# Patient Record
Sex: FEMALE | Race: WHITE | URBAN - METROPOLITAN AREA
[De-identification: names, ages, dates, MRNs, and addresses within clinical notes are randomized per-mention and may not be internally consistent; named-entity substitution may affect disease eponyms.]

---

## 2023-09-21 ENCOUNTER — HOSPITAL ENCOUNTER (INPATIENT)
Age: 68
LOS: 5 days | Discharge: HOME | DRG: 055 | End: 2023-09-26
Attending: INTERNAL MEDICINE | Admitting: INTERNAL MEDICINE
Payer: COMMERCIAL

## 2023-09-21 PROBLEM — G40.409 GENERALIZED TONIC-CLONIC SEIZURE (HCC): Status: ACTIVE | Noted: 2023-09-21

## 2023-09-21 PROBLEM — R56.9 SEIZURE (HCC): Status: ACTIVE | Noted: 2023-09-21

## 2023-09-21 PROBLEM — R93.0 ABNORMAL CT OF THE HEAD: Status: ACTIVE | Noted: 2023-09-21

## 2023-09-21 LAB
ALBUMIN SERPL-MCNC: 3.9 G/DL (ref 3.4–5)
ALBUMIN/GLOB SERPL: 1.8 {RATIO} (ref 1.1–2.2)
ALP SERPL-CCNC: 46 U/L (ref 40–129)
ALT SERPL-CCNC: 17 U/L (ref 10–40)
ANION GAP SERPL CALCULATED.3IONS-SCNC: 11 MMOL/L (ref 3–16)
ANION GAP SERPL CALCULATED.3IONS-SCNC: 14 MMOL/L (ref 3–16)
AST SERPL-CCNC: 26 U/L (ref 15–37)
BASOPHILS # BLD: 0 K/UL (ref 0–0.2)
BASOPHILS NFR BLD: 0.3 %
BILIRUB SERPL-MCNC: 0.7 MG/DL (ref 0–1)
BUN SERPL-MCNC: 15 MG/DL (ref 7–20)
BUN SERPL-MCNC: 17 MG/DL (ref 7–20)
CALCIUM SERPL-MCNC: 7.9 MG/DL (ref 8.3–10.6)
CALCIUM SERPL-MCNC: 8.4 MG/DL (ref 8.3–10.6)
CHLORIDE SERPL-SCNC: 98 MMOL/L (ref 99–110)
CHLORIDE SERPL-SCNC: 99 MMOL/L (ref 99–110)
CK SERPL-CCNC: 445 U/L (ref 26–192)
CO2 SERPL-SCNC: 22 MMOL/L (ref 21–32)
CO2 SERPL-SCNC: 23 MMOL/L (ref 21–32)
CREAT SERPL-MCNC: 0.7 MG/DL (ref 0.6–1.2)
CREAT SERPL-MCNC: 0.8 MG/DL (ref 0.6–1.2)
DEPRECATED RDW RBC AUTO: 13.7 % (ref 12.4–15.4)
EOSINOPHIL # BLD: 0 K/UL (ref 0–0.6)
EOSINOPHIL NFR BLD: 0 %
GFR SERPLBLD CREATININE-BSD FMLA CKD-EPI: >60 ML/MIN/{1.73_M2}
GFR SERPLBLD CREATININE-BSD FMLA CKD-EPI: >60 ML/MIN/{1.73_M2}
GLUCOSE SERPL-MCNC: 111 MG/DL (ref 70–99)
GLUCOSE SERPL-MCNC: 139 MG/DL (ref 70–99)
HCT VFR BLD AUTO: 32.4 % (ref 36–48)
HGB BLD-MCNC: 11.2 G/DL (ref 12–16)
LACTATE BLDV-SCNC: 1.4 MMOL/L (ref 0.4–2)
LYMPHOCYTES # BLD: 1.3 K/UL (ref 1–5.1)
LYMPHOCYTES NFR BLD: 8.7 %
MAGNESIUM SERPL-MCNC: 1.6 MG/DL (ref 1.8–2.4)
MCH RBC QN AUTO: 30.6 PG (ref 26–34)
MCHC RBC AUTO-ENTMCNC: 34.5 G/DL (ref 31–36)
MCV RBC AUTO: 88.5 FL (ref 80–100)
MONOCYTES # BLD: 1.6 K/UL (ref 0–1.3)
MONOCYTES NFR BLD: 10.8 %
NEUTROPHILS # BLD: 11.6 K/UL (ref 1.7–7.7)
NEUTROPHILS NFR BLD: 80.2 %
PLATELET # BLD AUTO: 235 K/UL (ref 135–450)
PMV BLD AUTO: 7.4 FL (ref 5–10.5)
POTASSIUM SERPL-SCNC: 2.8 MMOL/L (ref 3.5–5.1)
POTASSIUM SERPL-SCNC: 3.2 MMOL/L (ref 3.5–5.1)
PROT SERPL-MCNC: 6.1 G/DL (ref 6.4–8.2)
RBC # BLD AUTO: 3.66 M/UL (ref 4–5.2)
SODIUM SERPL-SCNC: 132 MMOL/L (ref 136–145)
SODIUM SERPL-SCNC: 135 MMOL/L (ref 136–145)
WBC # BLD AUTO: 14.4 K/UL (ref 4–11)

## 2023-09-21 PROCEDURE — 2580000003 HC RX 258: Performed by: INTERNAL MEDICINE

## 2023-09-21 PROCEDURE — 83605 ASSAY OF LACTIC ACID: CPT

## 2023-09-21 PROCEDURE — 36415 COLL VENOUS BLD VENIPUNCTURE: CPT

## 2023-09-21 PROCEDURE — 6360000002 HC RX W HCPCS: Performed by: INTERNAL MEDICINE

## 2023-09-21 PROCEDURE — 80053 COMPREHEN METABOLIC PANEL: CPT

## 2023-09-21 PROCEDURE — APPNB60 APP NON BILLABLE TIME 46-60 MINS: Performed by: NURSE PRACTITIONER

## 2023-09-21 PROCEDURE — 82550 ASSAY OF CK (CPK): CPT

## 2023-09-21 PROCEDURE — 2060000000 HC ICU INTERMEDIATE R&B

## 2023-09-21 PROCEDURE — 99223 1ST HOSP IP/OBS HIGH 75: CPT | Performed by: PSYCHIATRY & NEUROLOGY

## 2023-09-21 PROCEDURE — 83735 ASSAY OF MAGNESIUM: CPT

## 2023-09-21 PROCEDURE — 1200000000 HC SEMI PRIVATE

## 2023-09-21 PROCEDURE — 85025 COMPLETE CBC W/AUTO DIFF WBC: CPT

## 2023-09-21 PROCEDURE — 99285 EMERGENCY DEPT VISIT HI MDM: CPT

## 2023-09-21 RX ORDER — LEVETIRACETAM 500 MG/5ML
500 INJECTION, SOLUTION, CONCENTRATE INTRAVENOUS EVERY 12 HOURS
Status: DISCONTINUED | OUTPATIENT
Start: 2023-09-21 | End: 2023-09-22

## 2023-09-21 RX ORDER — METOPROLOL SUCCINATE 50 MG/1
50 TABLET, EXTENDED RELEASE ORAL NIGHTLY
Status: DISCONTINUED | OUTPATIENT
Start: 2023-09-21 | End: 2023-09-26 | Stop reason: HOSPADM

## 2023-09-21 RX ORDER — POLYETHYLENE GLYCOL 3350 17 G/17G
17 POWDER, FOR SOLUTION ORAL DAILY PRN
Status: DISCONTINUED | OUTPATIENT
Start: 2023-09-21 | End: 2023-09-26 | Stop reason: HOSPADM

## 2023-09-21 RX ORDER — ONDANSETRON 2 MG/ML
4 INJECTION INTRAMUSCULAR; INTRAVENOUS EVERY 6 HOURS PRN
Status: DISCONTINUED | OUTPATIENT
Start: 2023-09-21 | End: 2023-09-26 | Stop reason: HOSPADM

## 2023-09-21 RX ORDER — SODIUM CHLORIDE, SODIUM LACTATE, POTASSIUM CHLORIDE, CALCIUM CHLORIDE 600; 310; 30; 20 MG/100ML; MG/100ML; MG/100ML; MG/100ML
INJECTION, SOLUTION INTRAVENOUS CONTINUOUS
Status: DISCONTINUED | OUTPATIENT
Start: 2023-09-21 | End: 2023-09-26 | Stop reason: HOSPADM

## 2023-09-21 RX ORDER — POTASSIUM CHLORIDE 20 MEQ/1
40 TABLET, EXTENDED RELEASE ORAL PRN
Status: DISCONTINUED | OUTPATIENT
Start: 2023-09-21 | End: 2023-09-26 | Stop reason: HOSPADM

## 2023-09-21 RX ORDER — LORAZEPAM 2 MG/ML
4 INJECTION INTRAMUSCULAR EVERY 5 MIN PRN
Status: DISCONTINUED | OUTPATIENT
Start: 2023-09-21 | End: 2023-09-26 | Stop reason: HOSPADM

## 2023-09-21 RX ORDER — SODIUM CHLORIDE 0.9 % (FLUSH) 0.9 %
5-40 SYRINGE (ML) INJECTION PRN
Status: DISCONTINUED | OUTPATIENT
Start: 2023-09-21 | End: 2023-09-26 | Stop reason: HOSPADM

## 2023-09-21 RX ORDER — POTASSIUM CHLORIDE 7.45 MG/ML
10 INJECTION INTRAVENOUS PRN
Status: DISCONTINUED | OUTPATIENT
Start: 2023-09-21 | End: 2023-09-26 | Stop reason: HOSPADM

## 2023-09-21 RX ORDER — SODIUM CHLORIDE 0.9 % (FLUSH) 0.9 %
5-40 SYRINGE (ML) INJECTION EVERY 12 HOURS SCHEDULED
Status: DISCONTINUED | OUTPATIENT
Start: 2023-09-21 | End: 2023-09-26 | Stop reason: HOSPADM

## 2023-09-21 RX ORDER — NEBIVOLOL 5 MG/1
5 TABLET ORAL NIGHTLY
Status: DISCONTINUED | OUTPATIENT
Start: 2023-09-21 | End: 2023-09-21

## 2023-09-21 RX ORDER — MAGNESIUM HYDROXIDE/ALUMINUM HYDROXICE/SIMETHICONE 120; 1200; 1200 MG/30ML; MG/30ML; MG/30ML
30 SUSPENSION ORAL EVERY 6 HOURS PRN
Status: DISCONTINUED | OUTPATIENT
Start: 2023-09-21 | End: 2023-09-26 | Stop reason: HOSPADM

## 2023-09-21 RX ORDER — ENOXAPARIN SODIUM 100 MG/ML
40 INJECTION SUBCUTANEOUS DAILY
Status: DISPENSED | OUTPATIENT
Start: 2023-09-21 | End: 2023-09-24

## 2023-09-21 RX ORDER — ACETAMINOPHEN 650 MG/1
650 SUPPOSITORY RECTAL EVERY 6 HOURS PRN
Status: DISCONTINUED | OUTPATIENT
Start: 2023-09-21 | End: 2023-09-26 | Stop reason: HOSPADM

## 2023-09-21 RX ORDER — MAGNESIUM SULFATE IN WATER 40 MG/ML
2000 INJECTION, SOLUTION INTRAVENOUS PRN
Status: DISCONTINUED | OUTPATIENT
Start: 2023-09-21 | End: 2023-09-26 | Stop reason: HOSPADM

## 2023-09-21 RX ORDER — ONDANSETRON 4 MG/1
4 TABLET, ORALLY DISINTEGRATING ORAL EVERY 8 HOURS PRN
Status: DISCONTINUED | OUTPATIENT
Start: 2023-09-21 | End: 2023-09-26 | Stop reason: HOSPADM

## 2023-09-21 RX ORDER — HYDROCHLOROTHIAZIDE 25 MG/1
25 TABLET ORAL NIGHTLY
COMMUNITY
Start: 2023-06-29

## 2023-09-21 RX ORDER — ACETAMINOPHEN 325 MG/1
650 TABLET ORAL EVERY 6 HOURS PRN
Status: DISCONTINUED | OUTPATIENT
Start: 2023-09-21 | End: 2023-09-26 | Stop reason: HOSPADM

## 2023-09-21 RX ORDER — MAGNESIUM SULFATE IN WATER 40 MG/ML
2000 INJECTION, SOLUTION INTRAVENOUS ONCE
Status: DISCONTINUED | OUTPATIENT
Start: 2023-09-21 | End: 2023-09-26 | Stop reason: HOSPADM

## 2023-09-21 RX ORDER — SODIUM CHLORIDE 9 MG/ML
INJECTION, SOLUTION INTRAVENOUS PRN
Status: DISCONTINUED | OUTPATIENT
Start: 2023-09-21 | End: 2023-09-26 | Stop reason: HOSPADM

## 2023-09-21 RX ADMIN — LEVETIRACETAM 500 MG: 500 INJECTION INTRAVENOUS at 17:34

## 2023-09-21 RX ADMIN — POTASSIUM CHLORIDE 10 MEQ: 7.46 INJECTION, SOLUTION INTRAVENOUS at 12:15

## 2023-09-21 RX ADMIN — POTASSIUM CHLORIDE 10 MEQ: 7.46 INJECTION, SOLUTION INTRAVENOUS at 10:36

## 2023-09-21 RX ADMIN — POTASSIUM CHLORIDE 10 MEQ: 7.46 INJECTION, SOLUTION INTRAVENOUS at 08:59

## 2023-09-21 RX ADMIN — MAGNESIUM SULFATE HEPTAHYDRATE 2000 MG: 40 INJECTION, SOLUTION INTRAVENOUS at 07:56

## 2023-09-21 RX ADMIN — POTASSIUM CHLORIDE 10 MEQ: 7.46 INJECTION, SOLUTION INTRAVENOUS at 13:56

## 2023-09-21 RX ADMIN — POTASSIUM CHLORIDE 10 MEQ: 7.46 INJECTION, SOLUTION INTRAVENOUS at 15:11

## 2023-09-21 RX ADMIN — SODIUM CHLORIDE, POTASSIUM CHLORIDE, SODIUM LACTATE AND CALCIUM CHLORIDE: 600; 310; 30; 20 INJECTION, SOLUTION INTRAVENOUS at 05:25

## 2023-09-21 RX ADMIN — LEVETIRACETAM 500 MG: 500 INJECTION INTRAVENOUS at 05:28

## 2023-09-21 RX ADMIN — SODIUM CHLORIDE, PRESERVATIVE FREE 10 ML: 5 INJECTION INTRAVENOUS at 21:33

## 2023-09-21 RX ADMIN — POTASSIUM CHLORIDE 10 MEQ: 7.46 INJECTION, SOLUTION INTRAVENOUS at 07:07

## 2023-09-21 NOTE — H&P
seizures. According to her family, she was having nausea and vomiting with a dull headache yesterday morning. She was partially somnolent all yesterday and in the evening she became confused. She could not remember recent events. She was taken to ER for evaluation. Stat blood sugar was normal.  She had 2 episodes of seizure in the ER. The first episode was while she was sitting in a wheelchair-tonic-clonic seizure activity that lasted for 20 seconds. She had another episode after returning back from CT scan during which she bit her tongue. This episode lasted for 30 seconds. She received Ativan, vancomycin and Zosyn. WBC 18,000, potassium 3.0, normal LFT, normal UA and D-dimer. No history of seizure disorder. LP was done and patient transferred here for further management. Review of Systems:        Pertinent positives and negatives discussed in HPI     Objective:   No intake or output data in the 24 hours ending 09/21/23 1146   Vitals:   Vitals:    09/21/23 0730 09/21/23 0759 09/21/23 0900 09/21/23 1000   BP: (!) 147/97  133/80 (!) 141/76   Pulse: 73  81 87   Resp: 19 29 24   Temp:       TempSrc:       SpO2: 98%  97% 95%   Weight:  135 lb 14.4 oz (61.6 kg)     Height:  5' (1.524 m)         Medications Prior to Admission     Prior to Admission medications    Medication Sig Start Date End Date Taking? Authorizing Provider   Nebivolol HCl (BYSTOLIC PO) Take 5 mg by mouth nightly   Yes Historical Provider, MD   hydroCHLOROthiazide (HYDRODIURIL) 25 MG tablet Take 1 tablet by mouth nightly 6/29/23   Historical Provider, MD       Physical Exam:    Physical Exam     General: No apparent respiratory distress. Eyes: EOMI  ENT: neck supple  Cardiovascular: Regular rate. Respiratory: Clear to auscultation  Gastrointestinal: Soft, non tender  Genitourinary: no suprapubic tenderness  Musculoskeletal: No edema  Skin: warm, dry  Neuro: Alert and awake.   Lethargic but no gross focal neurological

## 2023-09-21 NOTE — ED NOTES
[place], [place]  09/21/23 1007
MRI reported they most likely will not be able to take pt for imaging until tonight. Provider notified. Pt and pt's family notified.       [place], [address] [place] [address]  09/21/23 0453
Patient potassium was 2.8/ [facility] about critical value. Protocol order started.        Jade Granados RN  09/21/23 8989
Potassium replacement complete per provider order. 6 doses of potassium chloride 10mEq/100mL IVPB have been administered (see MAR).       [place], [place]  09/21/23 9794
Pt observed resting in bed with eyes closed. Family at bedside. Pt denies pain, appears to be lethargic and drifts off between assessment questions. Per night shift RN's report, pt has been lethargic since arrival to ED as she received medication en route. Seizure pads in place on side rails of stretcher. Suction and oxygen equipment ready at bedside. Pt remains on bedside cardiac monitor.       Su vanegas RN  09/21/23 [address] [place] [address] JERO Cohen  09/21/23 7187
Right Hand (Active)     PO Status: Nothing by Mouth  Pertinent or High Risk Medications/Drips: yes   If Yes, please provide details: potassium   Pending Blood Product Administration: no       You may also review the ED PT Care Timeline found under the Summary Nursing Index tab. Recommendation    Pending orders   Plan for Discharge (if known): Additional Comments: pt alert and oriented. Pt able to answer questions but has delayed responses at times. MRI screening is completed.    If any further questions, please call Sending RN at [phone]    Electronically signed by: Electronically signed by Luz Ritter RN on 9/21/2023 at 2:52 PM      Susanne Huang  09/21/23 5109

## 2023-09-21 NOTE — ED TRIAGE NOTES
Patient arrived to the ER via ambulance from [facility]. Patient eye's are close but open to voice. Vital are stable and on the monitor. Patient family is in the patient room.

## 2023-09-21 NOTE — PLAN OF CARE
Problem: Discharge Planning  Goal: Discharge to home or other facility with appropriate resources  Outcome: Progressing   Pt involved in discharge planning. Barriers to discharge discussed with patient. Discharge learning needs identified. Discuss with patient any additional needed resources and transportation plans. Case management following plan of care. Problem: Safety - Adult  Goal: Free from fall injury  Outcome: Progressing   All fall precautions in place. Bed locked and in lowest position with alarm on. Overbed table and personal belonings within reach. Call light within reach and patient instructed to use call light for assistance. Non-skid socks on.

## 2023-09-22 ENCOUNTER — APPOINTMENT (OUTPATIENT)
Dept: MRI IMAGING | Age: 68
DRG: 055 | End: 2023-09-22
Attending: INTERNAL MEDICINE
Payer: COMMERCIAL

## 2023-09-22 ENCOUNTER — APPOINTMENT (OUTPATIENT)
Dept: GENERAL RADIOLOGY | Age: 68
DRG: 055 | End: 2023-09-22
Attending: INTERNAL MEDICINE
Payer: COMMERCIAL

## 2023-09-22 LAB
ANION GAP SERPL CALCULATED.3IONS-SCNC: 14 MMOL/L (ref 3–16)
BUN SERPL-MCNC: 13 MG/DL (ref 7–20)
CALCIUM SERPL-MCNC: 8.4 MG/DL (ref 8.3–10.6)
CHLORIDE SERPL-SCNC: 96 MMOL/L (ref 99–110)
CO2 SERPL-SCNC: 25 MMOL/L (ref 21–32)
CREAT SERPL-MCNC: 0.7 MG/DL (ref 0.6–1.2)
GFR SERPLBLD CREATININE-BSD FMLA CKD-EPI: >60 ML/MIN/{1.73_M2}
GLUCOSE SERPL-MCNC: 118 MG/DL (ref 70–99)
MAGNESIUM SERPL-MCNC: 1.9 MG/DL (ref 1.8–2.4)
NT-PROBNP SERPL-MCNC: 3436 PG/ML (ref 0–124)
POTASSIUM SERPL-SCNC: 3.1 MMOL/L (ref 3.5–5.1)
PROCALCITONIN SERPL IA-MCNC: 0.1 NG/ML (ref 0–0.15)
SODIUM SERPL-SCNC: 135 MMOL/L (ref 136–145)

## 2023-09-22 PROCEDURE — 2580000003 HC RX 258: Performed by: NURSE PRACTITIONER

## 2023-09-22 PROCEDURE — 6360000002 HC RX W HCPCS: Performed by: NURSE PRACTITIONER

## 2023-09-22 PROCEDURE — 70553 MRI BRAIN STEM W/O & W/DYE: CPT

## 2023-09-22 PROCEDURE — 71045 X-RAY EXAM CHEST 1 VIEW: CPT

## 2023-09-22 PROCEDURE — 97530 THERAPEUTIC ACTIVITIES: CPT

## 2023-09-22 PROCEDURE — 2060000000 HC ICU INTERMEDIATE R&B

## 2023-09-22 PROCEDURE — 94761 N-INVAS EAR/PLS OXIMETRY MLT: CPT

## 2023-09-22 PROCEDURE — 84145 PROCALCITONIN (PCT): CPT

## 2023-09-22 PROCEDURE — 6370000000 HC RX 637 (ALT 250 FOR IP): Performed by: NURSE PRACTITIONER

## 2023-09-22 PROCEDURE — 6370000000 HC RX 637 (ALT 250 FOR IP): Performed by: INTERNAL MEDICINE

## 2023-09-22 PROCEDURE — 83735 ASSAY OF MAGNESIUM: CPT

## 2023-09-22 PROCEDURE — A9576 INJ PROHANCE MULTIPACK: HCPCS | Performed by: EMERGENCY MEDICINE

## 2023-09-22 PROCEDURE — 97166 OT EVAL MOD COMPLEX 45 MIN: CPT

## 2023-09-22 PROCEDURE — 99233 SBSQ HOSP IP/OBS HIGH 50: CPT | Performed by: NURSE PRACTITIONER

## 2023-09-22 PROCEDURE — 87529 HSV DNA AMP PROBE: CPT

## 2023-09-22 PROCEDURE — 80048 BASIC METABOLIC PNL TOTAL CA: CPT

## 2023-09-22 PROCEDURE — 2580000003 HC RX 258: Performed by: INTERNAL MEDICINE

## 2023-09-22 PROCEDURE — 6360000002 HC RX W HCPCS: Performed by: INTERNAL MEDICINE

## 2023-09-22 PROCEDURE — 6360000004 HC RX CONTRAST MEDICATION: Performed by: EMERGENCY MEDICINE

## 2023-09-22 PROCEDURE — 83880 ASSAY OF NATRIURETIC PEPTIDE: CPT

## 2023-09-22 PROCEDURE — 97162 PT EVAL MOD COMPLEX 30 MIN: CPT

## 2023-09-22 PROCEDURE — 86592 SYPHILIS TEST NON-TREP QUAL: CPT

## 2023-09-22 PROCEDURE — 89050 BODY FLUID CELL COUNT: CPT

## 2023-09-22 PROCEDURE — 36415 COLL VENOUS BLD VENIPUNCTURE: CPT

## 2023-09-22 PROCEDURE — 97535 SELF CARE MNGMENT TRAINING: CPT

## 2023-09-22 PROCEDURE — 97116 GAIT TRAINING THERAPY: CPT

## 2023-09-22 PROCEDURE — 2700000000 HC OXYGEN THERAPY PER DAY

## 2023-09-22 RX ORDER — POTASSIUM CHLORIDE 20 MEQ/1
40 TABLET, EXTENDED RELEASE ORAL ONCE
Status: COMPLETED | OUTPATIENT
Start: 2023-09-22 | End: 2023-09-22

## 2023-09-22 RX ORDER — LEVETIRACETAM 500 MG/1
500 TABLET ORAL 2 TIMES DAILY
Status: DISCONTINUED | OUTPATIENT
Start: 2023-09-22 | End: 2023-09-26 | Stop reason: HOSPADM

## 2023-09-22 RX ADMIN — GADOTERIDOL 14 ML: 279.3 INJECTION, SOLUTION INTRAVENOUS at 12:33

## 2023-09-22 RX ADMIN — ACYCLOVIR SODIUM 600 MG: 50 INJECTION, SOLUTION INTRAVENOUS at 17:31

## 2023-09-22 RX ADMIN — POTASSIUM CHLORIDE 40 MEQ: 1500 TABLET, EXTENDED RELEASE ORAL at 09:21

## 2023-09-22 RX ADMIN — METOPROLOL SUCCINATE 50 MG: 50 TABLET, EXTENDED RELEASE ORAL at 01:37

## 2023-09-22 RX ADMIN — SODIUM CHLORIDE, POTASSIUM CHLORIDE, SODIUM LACTATE AND CALCIUM CHLORIDE: 600; 310; 30; 20 INJECTION, SOLUTION INTRAVENOUS at 07:45

## 2023-09-22 RX ADMIN — POTASSIUM CHLORIDE 40 MEQ: 1500 TABLET, EXTENDED RELEASE ORAL at 05:07

## 2023-09-22 RX ADMIN — LEVETIRACETAM 500 MG: 500 TABLET, FILM COATED ORAL at 20:46

## 2023-09-22 RX ADMIN — LEVETIRACETAM 500 MG: 500 INJECTION INTRAVENOUS at 05:07

## 2023-09-22 RX ADMIN — METOPROLOL SUCCINATE 50 MG: 50 TABLET, EXTENDED RELEASE ORAL at 20:46

## 2023-09-22 RX ADMIN — SODIUM CHLORIDE, PRESERVATIVE FREE 10 ML: 5 INJECTION INTRAVENOUS at 09:21

## 2023-09-22 RX ADMIN — SODIUM CHLORIDE, POTASSIUM CHLORIDE, SODIUM LACTATE AND CALCIUM CHLORIDE: 600; 310; 30; 20 INJECTION, SOLUTION INTRAVENOUS at 07:43

## 2023-09-22 NOTE — PLAN OF CARE
Assessment:  Patient is a 75 y.o. female w/seizure at OSH. Plan:  Neurologic exams frequency: Q4H  For change in exam MUST contact neurosurgery team along with critical care or primary team  Brain Mass:  - MRI Brain w wo to better evaluate hyperdense extra-axial mass along the anterolateral right temporal convexity  - Further recs after MRI  Seizure:  - Neurology consulted, appreciate recs  - Keppra 500 mg BID  Pain: Managed by medical team  Advance diet / activity per primary team  DVT Prophylaxis: SCD's & OK to start chemoprophylaxis, if medically indicated  Will follow inpatient.   Please call with any questions or decline in neurological status    Electronically signed by ORTEGA Mahmood CNP on 9/22/2023 at 6:56 AM

## 2023-09-22 NOTE — CARE COORDINATION
Case Management Assessment  Initial Evaluation    Date/Time of Evaluation: 9/22/2023 4:14 PM  Assessment Completed by: WESLEY Saba    If patient is discharged prior to next notation, then this note serves as note for discharge by case management. Patient Name: Christal Marsh                   YOB: 1955  Diagnosis: Seizure [facility]) [R56.9]                   Date / Time: 9/21/2023  3:37 AM    Patient Admission Status: Inpatient   Readmission Risk (Low < 19, Mod (19-27), High > 27): Readmission Risk Score: 5    Current PCP: PROVIDER UNKNOWN  PCP verified by CM? Yes    Chart Reviewed: Yes      History Provided by: Patient  Patient Orientation: Alert and Oriented    Patient Cognition:      Hospitalization in the last 30 days (Readmission):  No    If yes, Readmission Assessment in CM Navigator will be completed. Advance Directives:      Code Status: Full Code   Patient's Primary Decision Maker is: Legal Next of Kin      Discharge Planning:    Patient lives with: Spouse/Significant Other Type of Home: House  Primary Care Giver: Self  Patient Support Systems include: Spouse/Significant Other, Children   Current Financial resources: Other (Comment) (Commercial insurance)  Current community resources: None  Current services prior to admission: None            Current DME:              Type of Home Care services:  None    ADLS  Prior functional level: Independent in ADLs/IADLs  Current functional level: Independent in ADLs/IADLs    PT AM-PAC: 16 /24  OT AM-PAC: 16 /24    Family can provide assistance at DC: Yes  Would you like Case Management to discuss the discharge plan with any other family members/significant others, and if so, who? No  Plans to Return to Present Housing: Unknown at present  Other Identified Issues/Barriers to RETURNING to current housing: tbd  Potential Assistance needed at discharge:  Other (Comment) (TBD based on medical plan of care)            Potential DME:    Patient expects

## 2023-09-22 NOTE — PLAN OF CARE
Problem: Discharge Planning  Goal: Discharge to home or other facility with appropriate resources  Outcome: Progressing   Pt involved in discharge planning. Barriers to discharge discussed with patient. Discharge learning needs identified. Discuss with patient any additional needed resources and transportation plans. Case management following plan of care. Problem: Safety - Adult  Goal: Free from fall injury  9/22/2023 1028 by Aguilar Iglesias RN  Outcome: Progressing   All fall precautions in place. Bed locked and in lowest position with alarm on. Overbed table and personal belonings within reach. Call light within reach and patient instructed to use call light for assistance. Non-skid socks on. Problem: Pain  Goal: Verbalizes/displays adequate comfort level or baseline comfort level  9/22/2023 1028 by Aguilar Iglesias RN  Outcome: Progressing   No complaints of pain at this time. Continuing to monitor and manage per [facility]

## 2023-09-22 NOTE — PLAN OF CARE
Problem: Safety - Adult  Goal: Free from fall injury  9/22/2023 0104 by Sam Mullen RN  Outcome: Progressing     Problem: Pain  Goal: Verbalizes/displays adequate comfort level or baseline comfort level  9/22/2023 0359 by Sam Mullen RN  Outcome: Progressing  9/22/2023 0104 by Sam Mullen RN  Outcome: Progressing

## 2023-09-22 NOTE — PLAN OF CARE
Problem: Safety - Adult  Goal: Free from fall injury  9/22/2023 0104 by Katherine Cota RN  Outcome: Progressing     Problem: Pain  Goal: Verbalizes/displays adequate comfort level or baseline comfort level  Outcome: Progressing

## 2023-09-23 LAB
ANION GAP SERPL CALCULATED.3IONS-SCNC: 10 MMOL/L (ref 3–16)
BASOPHILS # BLD: 0 K/UL (ref 0–0.2)
BASOPHILS NFR BLD: 0.1 %
BUN SERPL-MCNC: 13 MG/DL (ref 7–20)
CALCIUM SERPL-MCNC: 8.8 MG/DL (ref 8.3–10.6)
CHLORIDE SERPL-SCNC: 99 MMOL/L (ref 99–110)
CO2 SERPL-SCNC: 27 MMOL/L (ref 21–32)
CREAT SERPL-MCNC: 0.8 MG/DL (ref 0.6–1.2)
DEPRECATED RDW RBC AUTO: 13.5 % (ref 12.4–15.4)
EOSINOPHIL # BLD: 0.1 K/UL (ref 0–0.6)
EOSINOPHIL NFR BLD: 0.7 %
GFR SERPLBLD CREATININE-BSD FMLA CKD-EPI: >60 ML/MIN/{1.73_M2}
GLUCOSE SERPL-MCNC: 103 MG/DL (ref 70–99)
HCT VFR BLD AUTO: 30.5 % (ref 36–48)
HGB BLD-MCNC: 10.9 G/DL (ref 12–16)
INR PPP: 1.06 (ref 0.84–1.16)
LYMPHOCYTES # BLD: 1.6 K/UL (ref 1–5.1)
LYMPHOCYTES NFR BLD: 15.6 %
MAGNESIUM SERPL-MCNC: 1.9 MG/DL (ref 1.8–2.4)
MCH RBC QN AUTO: 31.3 PG (ref 26–34)
MCHC RBC AUTO-ENTMCNC: 35.7 G/DL (ref 31–36)
MCV RBC AUTO: 87.6 FL (ref 80–100)
MONOCYTES # BLD: 1.6 K/UL (ref 0–1.3)
MONOCYTES NFR BLD: 15.3 %
NEUTROPHILS # BLD: 6.9 K/UL (ref 1.7–7.7)
NEUTROPHILS NFR BLD: 68.3 %
PLATELET # BLD AUTO: 209 K/UL (ref 135–450)
PMV BLD AUTO: 7.6 FL (ref 5–10.5)
POTASSIUM SERPL-SCNC: 3.7 MMOL/L (ref 3.5–5.1)
PROTHROMBIN TIME: 13.8 SEC (ref 11.5–14.8)
RBC # BLD AUTO: 3.48 M/UL (ref 4–5.2)
SODIUM SERPL-SCNC: 136 MMOL/L (ref 136–145)
WBC # BLD AUTO: 10.2 K/UL (ref 4–11)

## 2023-09-23 PROCEDURE — 94761 N-INVAS EAR/PLS OXIMETRY MLT: CPT

## 2023-09-23 PROCEDURE — 2580000003 HC RX 258: Performed by: NURSE PRACTITIONER

## 2023-09-23 PROCEDURE — 36415 COLL VENOUS BLD VENIPUNCTURE: CPT

## 2023-09-23 PROCEDURE — 83735 ASSAY OF MAGNESIUM: CPT

## 2023-09-23 PROCEDURE — 6360000002 HC RX W HCPCS: Performed by: INTERNAL MEDICINE

## 2023-09-23 PROCEDURE — 6360000002 HC RX W HCPCS: Performed by: NURSE PRACTITIONER

## 2023-09-23 PROCEDURE — 2060000000 HC ICU INTERMEDIATE R&B

## 2023-09-23 PROCEDURE — 85025 COMPLETE CBC W/AUTO DIFF WBC: CPT

## 2023-09-23 PROCEDURE — 80048 BASIC METABOLIC PNL TOTAL CA: CPT

## 2023-09-23 PROCEDURE — 6370000000 HC RX 637 (ALT 250 FOR IP): Performed by: INTERNAL MEDICINE

## 2023-09-23 PROCEDURE — 2580000003 HC RX 258: Performed by: INTERNAL MEDICINE

## 2023-09-23 PROCEDURE — 85610 PROTHROMBIN TIME: CPT

## 2023-09-23 PROCEDURE — 6370000000 HC RX 637 (ALT 250 FOR IP): Performed by: NURSE PRACTITIONER

## 2023-09-23 PROCEDURE — 2700000000 HC OXYGEN THERAPY PER DAY

## 2023-09-23 PROCEDURE — 99232 SBSQ HOSP IP/OBS MODERATE 35: CPT | Performed by: NURSE PRACTITIONER

## 2023-09-23 RX ADMIN — ACYCLOVIR SODIUM 600 MG: 50 INJECTION, SOLUTION INTRAVENOUS at 16:50

## 2023-09-23 RX ADMIN — ENOXAPARIN SODIUM 40 MG: 100 INJECTION SUBCUTANEOUS at 10:29

## 2023-09-23 RX ADMIN — ACYCLOVIR SODIUM 600 MG: 50 INJECTION, SOLUTION INTRAVENOUS at 00:25

## 2023-09-23 RX ADMIN — ACYCLOVIR SODIUM 600 MG: 50 INJECTION, SOLUTION INTRAVENOUS at 10:27

## 2023-09-23 RX ADMIN — SODIUM CHLORIDE, PRESERVATIVE FREE 10 ML: 5 INJECTION INTRAVENOUS at 08:26

## 2023-09-23 RX ADMIN — LEVETIRACETAM 500 MG: 500 TABLET, FILM COATED ORAL at 20:33

## 2023-09-23 RX ADMIN — ACETAMINOPHEN 650 MG: 325 TABLET ORAL at 10:41

## 2023-09-23 RX ADMIN — SODIUM CHLORIDE, PRESERVATIVE FREE 10 ML: 5 INJECTION INTRAVENOUS at 20:37

## 2023-09-23 RX ADMIN — LEVETIRACETAM 500 MG: 500 TABLET, FILM COATED ORAL at 08:25

## 2023-09-23 RX ADMIN — METOPROLOL SUCCINATE 50 MG: 50 TABLET, EXTENDED RELEASE ORAL at 20:33

## 2023-09-23 NOTE — PLAN OF CARE
Problem: Safety - Adult  Goal: Free from fall injury  9/23/2023 0738 by Scotty Leone RN  Outcome: Progressing   Patient has all standard safety precautions in place, call light and tray table are in reach. Problem: Pain  Goal: Verbalizes/displays adequate comfort level or baseline comfort level  9/23/2023 0738 by Scotty Leone RN  Outcome: Progressing   Patients medications are being managed by the [facility] Problem: Discharge Planning  Goal: Discharge to home or other facility with appropriate resources  9/23/2023 0738 by Scotty Leone RN  Outcome: Progressing   Patient is working towards goals to discharge.

## 2023-09-23 NOTE — PLAN OF CARE
Problem: Discharge Planning  Goal: Discharge to home or other facility with appropriate resources  9/22/2023 2013 by Juan Ramon Carl RN  Outcome: Progressing    Problem: Safety - Adult  Goal: Free from fall injury  9/22/2023 2013 by Juan Ramon Carl RN  Outcome: Progressing    Problem: Pain  Goal: Verbalizes/displays adequate comfort level or baseline comfort level  9/22/2023 2013 by Juan Ramon Carl RN  Outcome: Progressing

## 2023-09-24 LAB
ANION GAP SERPL CALCULATED.3IONS-SCNC: 12 MMOL/L (ref 3–16)
BUN SERPL-MCNC: 12 MG/DL (ref 7–20)
CALCIUM SERPL-MCNC: 8.8 MG/DL (ref 8.3–10.6)
CHLORIDE SERPL-SCNC: 101 MMOL/L (ref 99–110)
CO2 SERPL-SCNC: 26 MMOL/L (ref 21–32)
CREAT SERPL-MCNC: 0.8 MG/DL (ref 0.6–1.2)
GFR SERPLBLD CREATININE-BSD FMLA CKD-EPI: >60 ML/MIN/{1.73_M2}
GLUCOSE SERPL-MCNC: 101 MG/DL (ref 70–99)
POTASSIUM SERPL-SCNC: 3.4 MMOL/L (ref 3.5–5.1)
SODIUM SERPL-SCNC: 139 MMOL/L (ref 136–145)

## 2023-09-24 PROCEDURE — 6360000002 HC RX W HCPCS: Performed by: NURSE PRACTITIONER

## 2023-09-24 PROCEDURE — 2580000003 HC RX 258: Performed by: INTERNAL MEDICINE

## 2023-09-24 PROCEDURE — 6370000000 HC RX 637 (ALT 250 FOR IP): Performed by: NURSE PRACTITIONER

## 2023-09-24 PROCEDURE — 97116 GAIT TRAINING THERAPY: CPT

## 2023-09-24 PROCEDURE — 97530 THERAPEUTIC ACTIVITIES: CPT

## 2023-09-24 PROCEDURE — 80048 BASIC METABOLIC PNL TOTAL CA: CPT

## 2023-09-24 PROCEDURE — 2060000000 HC ICU INTERMEDIATE R&B

## 2023-09-24 PROCEDURE — APPNB30 APP NON BILLABLE TIME 0-30 MINS: Performed by: NURSE PRACTITIONER

## 2023-09-24 PROCEDURE — 36415 COLL VENOUS BLD VENIPUNCTURE: CPT

## 2023-09-24 PROCEDURE — 2580000003 HC RX 258: Performed by: NURSE PRACTITIONER

## 2023-09-24 RX ADMIN — ACYCLOVIR SODIUM 600 MG: 50 INJECTION, SOLUTION INTRAVENOUS at 09:23

## 2023-09-24 RX ADMIN — ACYCLOVIR SODIUM 600 MG: 50 INJECTION, SOLUTION INTRAVENOUS at 01:24

## 2023-09-24 RX ADMIN — ACYCLOVIR SODIUM 600 MG: 50 INJECTION, SOLUTION INTRAVENOUS at 22:11

## 2023-09-24 RX ADMIN — SODIUM CHLORIDE, PRESERVATIVE FREE 10 ML: 5 INJECTION INTRAVENOUS at 09:18

## 2023-09-24 RX ADMIN — LEVETIRACETAM 500 MG: 500 TABLET, FILM COATED ORAL at 09:16

## 2023-09-24 RX ADMIN — LEVETIRACETAM 500 MG: 500 TABLET, FILM COATED ORAL at 19:54

## 2023-09-24 RX ADMIN — SODIUM CHLORIDE, PRESERVATIVE FREE 10 ML: 5 INJECTION INTRAVENOUS at 22:12

## 2023-09-24 RX ADMIN — ENOXAPARIN SODIUM 40 MG: 100 INJECTION SUBCUTANEOUS at 09:16

## 2023-09-24 RX ADMIN — METOPROLOL SUCCINATE 50 MG: 50 TABLET, EXTENDED RELEASE ORAL at 19:54

## 2023-09-24 NOTE — PLAN OF CARE
Problem: Discharge Planning  Goal: Discharge to home or other facility with appropriate resources  9/24/2023 0658 by Patrick Russo RN  Outcome: Progressing   Patient is working towards goals to discharge. Problem: Safety - Adult  Goal: Free from fall injury  9/24/2023 0658 by Patrick Russo RN  Outcome: Progressing   Patient has all standard fall precautions in place, call light and tray are in reach.   Problem: Pain  Goal: Verbalizes/displays adequate comfort level or baseline comfort level  9/24/2023 0658 by Patrick Russo RN  Outcome: Progressing   Patients pain medications are managed by the Mar.

## 2023-09-25 ENCOUNTER — APPOINTMENT (OUTPATIENT)
Dept: GENERAL RADIOLOGY | Age: 68
DRG: 055 | End: 2023-09-25
Attending: INTERNAL MEDICINE
Payer: COMMERCIAL

## 2023-09-25 LAB
APPEARANCE CSF: CLEAR
CLOT EVALUATION CSF: ABNORMAL
COLOR CSF: COLORLESS
GLUCOSE CSF-MCNC: 70 MG/DL (ref 40–80)
MANUAL DIF COMMENT CSF-IMP: ABNORMAL
MENING+ENC PNL CSF NAA+NON-PROBE: NORMAL
NUC CELL # FLD MANUAL: 2 /CUMM (ref 0–5)
PROT CSF-MCNC: 44 MG/DL (ref 15–45)
RBC # FLD MANUAL: 345 /CUMM
REPORT: NORMAL
TUBE # CSF: ABNORMAL

## 2023-09-25 PROCEDURE — 009U3ZX DRAINAGE OF SPINAL CANAL, PERCUTANEOUS APPROACH, DIAGNOSTIC: ICD-10-PCS | Performed by: RADIOLOGY

## 2023-09-25 PROCEDURE — 88112 CYTOPATH CELL ENHANCE TECH: CPT

## 2023-09-25 PROCEDURE — 99232 SBSQ HOSP IP/OBS MODERATE 35: CPT | Performed by: NURSE PRACTITIONER

## 2023-09-25 PROCEDURE — 2580000003 HC RX 258: Performed by: INTERNAL MEDICINE

## 2023-09-25 PROCEDURE — B01B1ZZ FLUOROSCOPY OF SPINAL CORD USING LOW OSMOLAR CONTRAST: ICD-10-PCS | Performed by: RADIOLOGY

## 2023-09-25 PROCEDURE — 62328 DX LMBR SPI PNXR W/FLUOR/CT: CPT

## 2023-09-25 PROCEDURE — 6360000002 HC RX W HCPCS: Performed by: NURSE PRACTITIONER

## 2023-09-25 PROCEDURE — 6370000000 HC RX 637 (ALT 250 FOR IP): Performed by: NURSE PRACTITIONER

## 2023-09-25 PROCEDURE — 87205 SMEAR GRAM STAIN: CPT

## 2023-09-25 PROCEDURE — 84157 ASSAY OF PROTEIN OTHER: CPT

## 2023-09-25 PROCEDURE — 2580000003 HC RX 258: Performed by: NURSE PRACTITIONER

## 2023-09-25 PROCEDURE — 82945 GLUCOSE OTHER FLUID: CPT

## 2023-09-25 PROCEDURE — 87483 CNS DNA AMP PROBE TYPE 12-25: CPT

## 2023-09-25 PROCEDURE — 87070 CULTURE OTHR SPECIMN AEROBIC: CPT

## 2023-09-25 PROCEDURE — 2060000000 HC ICU INTERMEDIATE R&B

## 2023-09-25 RX ORDER — LEVETIRACETAM 500 MG/1
500 TABLET ORAL 2 TIMES DAILY
Qty: 60 TABLET | Refills: 3 | Status: SHIPPED | OUTPATIENT
Start: 2023-09-25

## 2023-09-25 RX ORDER — CHLOROPROCAINE HYDROCHLORIDE 20 MG/ML
5 INJECTION, SOLUTION EPIDURAL; INFILTRATION; INTRACAUDAL; PERINEURAL ONCE
Status: COMPLETED | OUTPATIENT
Start: 2023-09-25 | End: 2023-09-25

## 2023-09-25 RX ADMIN — METOPROLOL SUCCINATE 50 MG: 50 TABLET, EXTENDED RELEASE ORAL at 20:39

## 2023-09-25 RX ADMIN — SODIUM CHLORIDE, POTASSIUM CHLORIDE, SODIUM LACTATE AND CALCIUM CHLORIDE: 600; 310; 30; 20 INJECTION, SOLUTION INTRAVENOUS at 20:35

## 2023-09-25 RX ADMIN — ACYCLOVIR SODIUM 600 MG: 50 INJECTION, SOLUTION INTRAVENOUS at 15:55

## 2023-09-25 RX ADMIN — CHLOROPROCAINE HYDROCHLORIDE 5 MG: 20 INJECTION, SOLUTION EPIDURAL; INFILTRATION; INTRACAUDAL; PERINEURAL at 09:02

## 2023-09-25 RX ADMIN — ACYCLOVIR SODIUM 600 MG: 50 INJECTION, SOLUTION INTRAVENOUS at 05:43

## 2023-09-25 RX ADMIN — SODIUM CHLORIDE, PRESERVATIVE FREE 10 ML: 5 INJECTION INTRAVENOUS at 20:42

## 2023-09-25 RX ADMIN — LEVETIRACETAM 500 MG: 500 TABLET, FILM COATED ORAL at 10:41

## 2023-09-25 RX ADMIN — SODIUM CHLORIDE, PRESERVATIVE FREE 10 ML: 5 INJECTION INTRAVENOUS at 10:41

## 2023-09-25 RX ADMIN — LEVETIRACETAM 500 MG: 500 TABLET, FILM COATED ORAL at 20:39

## 2023-09-25 RX ADMIN — ACYCLOVIR SODIUM 600 MG: 50 INJECTION, SOLUTION INTRAVENOUS at 20:38

## 2023-09-25 ASSESSMENT — PAIN SCALES - GENERAL: PAINLEVEL_OUTOF10: 5

## 2023-09-25 NOTE — PLAN OF CARE
Problem: Discharge Planning  Goal: Discharge to home or other facility with appropriate resources  Outcome: Progressing  Flowsheets (Taken 9/25/2023 1837)  Discharge to home or other facility with appropriate resources:   Identify discharge learning needs (meds, wound care, etc)   Arrange for needed discharge resources and transportation as appropriate   Identify barriers to discharge with patient and caregiver     Problem: Safety - Adult  Goal: Free from fall injury  Outcome: Progressing  Flowsheets (Taken 9/25/2023 1837)  Free From Fall Injury:   Instruct family/caregiver on patient safety   Based on caregiver fall risk screen, instruct family/caregiver to ask for assistance with transferring infant if caregiver noted to have fall risk factors     Problem: Pain  Goal: Verbalizes/displays adequate comfort level or baseline comfort level  Outcome: Progressing  Flowsheets (Taken 9/25/2023 1837)  Verbalizes/displays adequate comfort level or baseline comfort level:   Encourage patient to monitor pain and request assistance   Assess pain using appropriate pain scale   Administer analgesics based on type and severity of pain and evaluate response   Implement non-pharmacological measures as appropriate and evaluate response   Consider cultural and social influences on pain and pain management

## 2023-09-25 NOTE — PLAN OF CARE
Problem: Discharge Planning  Goal: Discharge to home or other facility with appropriate resources  9/24/2023 2012 by Merrick Kam RN  Outcome: Progressing    Problem: Safety - Adult  Goal: Free from fall injury  9/24/2023 2012 by Merrick Kam RN  Outcome: Progressing       Problem: Pain  Goal: Verbalizes/displays adequate comfort level or baseline comfort level  9/24/2023 2012 by Merrick Kam RN  Outcome: Progressing

## 2023-09-25 NOTE — CARE COORDINATION
CM following: CM met with pt and  at bedside. Pt is from home with  and is independent pta. Pt plans to return home at discharge and currently identifies no HHC, DME or CM needs for discharge. CM will continue to follow.   Electronically signed by WESLEY Paulino on 9/25/2023 at 11:23 AM  [phone]

## 2023-09-25 NOTE — DISCHARGE INSTRUCTIONS
Driving and Safety Risks: You have had an event concerning for a seizure. No driving until you have been event free for at least 3 months. Having a seizure while driving puts you at risk for injuring yourself or others. If you drive while having uncontrolled seizures, you may be held liable for injuries to others. If you live outside of [place] the driving limitation may be longer. You should check with applicable local laws prior to travel. You should avoid working at heights including claiming up ladders. You should avoid swimming alone or taking baths in the bathtub. It is recommended to use only showers.

## 2023-09-26 ENCOUNTER — APPOINTMENT (OUTPATIENT)
Dept: VASCULAR LAB | Age: 68
DRG: 055 | End: 2023-09-26
Attending: INTERNAL MEDICINE
Payer: COMMERCIAL

## 2023-09-26 VITALS
HEART RATE: 76 BPM | SYSTOLIC BLOOD PRESSURE: 148 MMHG | DIASTOLIC BLOOD PRESSURE: 78 MMHG | HEIGHT: 60 IN | RESPIRATION RATE: 16 BRPM | OXYGEN SATURATION: 96 % | BODY MASS INDEX: 26.68 KG/M2 | WEIGHT: 135.9 LBS | TEMPERATURE: 98 F

## 2023-09-26 LAB
ALBUMIN CSF-MCNC: 21.7 MG/DL (ref 10–30)
ALBUMIN SERPL-MCNC: 3009 MG/DL (ref 3400–5000)
ALBUMIN SERPL-MCNC: 4 G/DL (ref 3.4–5)
ALBUMIN/GLOB SERPL: 1.3 {RATIO} (ref 1.1–2.2)
ALP SERPL-CCNC: 57 U/L (ref 40–129)
ALT SERPL-CCNC: 34 U/L (ref 10–40)
ANION GAP SERPL CALCULATED.3IONS-SCNC: 14 MMOL/L (ref 3–16)
AST SERPL-CCNC: 27 U/L (ref 15–37)
BASOPHILS # BLD: 0.1 K/UL (ref 0–0.2)
BASOPHILS NFR BLD: 0.7 %
BILIRUB SERPL-MCNC: 0.7 MG/DL (ref 0–1)
BUN SERPL-MCNC: 9 MG/DL (ref 7–20)
CALCIUM SERPL-MCNC: 9.4 MG/DL (ref 8.3–10.6)
CHLORIDE SERPL-SCNC: 99 MMOL/L (ref 99–110)
CO2 SERPL-SCNC: 25 MMOL/L (ref 21–32)
CREAT SERPL-MCNC: 0.9 MG/DL (ref 0.6–1.2)
DEPRECATED RDW RBC AUTO: 13.5 % (ref 12.4–15.4)
EOSINOPHIL # BLD: 0.2 K/UL (ref 0–0.6)
EOSINOPHIL NFR BLD: 1.4 %
GFR SERPLBLD CREATININE-BSD FMLA CKD-EPI: >60 ML/MIN/{1.73_M2}
GLUCOSE SERPL-MCNC: 118 MG/DL (ref 70–99)
HCT VFR BLD AUTO: 36.9 % (ref 36–48)
HGB BLD-MCNC: 12.8 G/DL (ref 12–16)
IGG CSF-MCNC: 3.3 MG/DL (ref 0–6)
IGG CSF/SERPL: 0.7 {RATIO} (ref 0.2–0.7)
IGG SERPL-MCNC: 657 MG/DL (ref 700–1600)
IGG SYNTH RATE SER+CSF CALC-MRATE: 3.6 MG/DAY (ref -9.9–3.3)
LYMPHOCYTES # BLD: 1.8 K/UL (ref 1–5.1)
LYMPHOCYTES NFR BLD: 14.4 %
MAGNESIUM SERPL-MCNC: 1.8 MG/DL (ref 1.8–2.4)
MCH RBC QN AUTO: 30.6 PG (ref 26–34)
MCHC RBC AUTO-ENTMCNC: 34.6 G/DL (ref 31–36)
MCV RBC AUTO: 88.6 FL (ref 80–100)
MONOCYTES # BLD: 1.6 K/UL (ref 0–1.3)
MONOCYTES NFR BLD: 12.6 %
NEUTROPHILS # BLD: 9 K/UL (ref 1.7–7.7)
NEUTROPHILS NFR BLD: 70.9 %
PLATELET # BLD AUTO: 377 K/UL (ref 135–450)
PMV BLD AUTO: 7.2 FL (ref 5–10.5)
POTASSIUM SERPL-SCNC: 3.2 MMOL/L (ref 3.5–5.1)
PROCALCITONIN SERPL IA-MCNC: 0.09 NG/ML (ref 0–0.15)
PROT SERPL-MCNC: 7 G/DL (ref 6.4–8.2)
RBC # BLD AUTO: 4.17 M/UL (ref 4–5.2)
SODIUM SERPL-SCNC: 138 MMOL/L (ref 136–145)
WBC # BLD AUTO: 12.8 K/UL (ref 4–11)

## 2023-09-26 PROCEDURE — 2580000003 HC RX 258: Performed by: INTERNAL MEDICINE

## 2023-09-26 PROCEDURE — 85025 COMPLETE CBC W/AUTO DIFF WBC: CPT

## 2023-09-26 PROCEDURE — 84145 PROCALCITONIN (PCT): CPT

## 2023-09-26 PROCEDURE — 6370000000 HC RX 637 (ALT 250 FOR IP): Performed by: NURSE PRACTITIONER

## 2023-09-26 PROCEDURE — 93971 EXTREMITY STUDY: CPT

## 2023-09-26 PROCEDURE — 80053 COMPREHEN METABOLIC PANEL: CPT

## 2023-09-26 PROCEDURE — 83735 ASSAY OF MAGNESIUM: CPT

## 2023-09-26 PROCEDURE — 6370000000 HC RX 637 (ALT 250 FOR IP): Performed by: INTERNAL MEDICINE

## 2023-09-26 PROCEDURE — 36415 COLL VENOUS BLD VENIPUNCTURE: CPT

## 2023-09-26 RX ADMIN — ACETAMINOPHEN 650 MG: 325 TABLET ORAL at 13:15

## 2023-09-26 RX ADMIN — SODIUM CHLORIDE, PRESERVATIVE FREE 10 ML: 5 INJECTION INTRAVENOUS at 08:45

## 2023-09-26 RX ADMIN — LEVETIRACETAM 500 MG: 500 TABLET, FILM COATED ORAL at 08:40

## 2023-09-26 ASSESSMENT — PAIN DESCRIPTION - FREQUENCY: FREQUENCY: CONTINUOUS

## 2023-09-26 ASSESSMENT — PAIN SCALES - GENERAL
PAINLEVEL_OUTOF10: 2
PAINLEVEL_OUTOF10: 6

## 2023-09-26 ASSESSMENT — PAIN DESCRIPTION - DESCRIPTORS: DESCRIPTORS: ACHING;DISCOMFORT

## 2023-09-26 ASSESSMENT — PAIN DESCRIPTION - ONSET: ONSET: ON-GOING

## 2023-09-26 ASSESSMENT — PAIN DESCRIPTION - LOCATION: LOCATION: KNEE

## 2023-09-26 ASSESSMENT — PAIN SCALES - WONG BAKER: WONGBAKER_NUMERICALRESPONSE: 2

## 2023-09-26 ASSESSMENT — PAIN DESCRIPTION - PAIN TYPE: TYPE: ACUTE PAIN

## 2023-09-26 ASSESSMENT — PAIN DESCRIPTION - ORIENTATION: ORIENTATION: LEFT

## 2023-09-26 ASSESSMENT — PAIN - FUNCTIONAL ASSESSMENT: PAIN_FUNCTIONAL_ASSESSMENT: PREVENTS OR INTERFERES WITH MANY ACTIVE NOT PASSIVE ACTIVITIES

## 2023-09-26 NOTE — CARE COORDINATION
CM following: Pt is from home with  and is independent pta. Pt plans to return home at discharge and currently identifies no HHC, DME or CM needs for discharge. CM will continue to follow.   Electronically signed by WESLEY Carvalho on 9/26/2023 at 9:56 AM  [phone]

## 2023-09-26 NOTE — PLAN OF CARE
Neurology Plan of Care    Patient is a 74 y/o F who presented w/ AMS and seizure. MRI shows R temporal lobe flair changes - infectious vs autoimmune vs primary glioma  S/p LP. WBC 2, Protein/Glucose normal  LP at OSH had 0 WBC, now this is second LP w/ no increase in WBC which makes infection much less likely. Plan   Okay to discontinue Acyclovir  Continue keppra 500mg BID on discharge (script sent down to pharmacy)   Seizure precautions   Neurosurgery consulted - if inflammatory w/u negative will need to follow up w/ repeat MRI to determine if biopsy is necessary in 1-2 months  No need for steroids   Okay to discharge. She will need to follow up w/ neurology in 2-3 weeks and will f/u w/ Neurosurgery in 4-6 weeks w/ repeat MRI. Seizure safety and driving precautions discussed w/ patient at bedside yesterday   We will sign off.  Please call with questions    ORTEGA Olivas - CNP   Neurology & Neurocritical Care   9/26/2023 9:53 AM    ICU Patients:   [facility]: [address]    Floor / PCU Patients:  [facility]: [facility]

## 2023-09-26 NOTE — DISCHARGE SUMMARY
\"LABURIN\"  Blood Cultures: No results found for: \"BC\"  No results found for: \"BLOODCULT2\"  Organism: No results found for: \"ORG\"    Time Spent Discharging patient 35 minutes    Electronically signed by Vivienne Haddad MD on 9/26/2023 at 1:45 PM

## 2023-09-26 NOTE — PLAN OF CARE
Problem: Safety - Adult  Goal: Free from fall injury  9/26/2023 0058 by Concetta Mcconnell RN  Outcome: Progressing  Flowsheets (Taken 9/25/2023 1837)  Free From Fall Injury:   Ceci Lopez family/caregiver on patient safety   Based on caregiver fall risk screen, instruct family/caregiver to ask for assistance with transferring infant if caregiver noted to have fall risk factors     Problem: Pain  Goal: Verbalizes/displays adequate comfort level or baseline comfort level  9/26/2023 0058 by Concetta Mcconnell RN  Outcome: Progressing    Verbalizes/displays adequate comfort level or baseline comfort level:   Encourage patient to monitor pain and request assistance   Assess pain using appropriate pain scale   Administer analgesics based on type and severity of pain and evaluate response   Implement non-pharmacological measures as appropriate and evaluate response   Consider cultural and social influences on pain and pain management

## 2023-09-26 NOTE — PLAN OF CARE
Problem: Discharge Planning  Goal: Discharge to home or other facility with appropriate resources  9/26/2023 1517 by Stevenson Helm RN  Outcome: Adequate for Discharge  9/26/2023 1517 by Stevenson Helm RN  Outcome: Progressing     Problem: Pain  Goal: Verbalizes/displays adequate comfort level or baseline comfort level  9/26/2023 1517 by Stevenson Helm RN  Outcome: Adequate for Discharge  9/26/2023 1517 by Stevenson Helm RN  Outcome: Progressing  Flowsheets (Taken 9/25/2023 1837)  Verbalizes/displays adequate comfort level or baseline comfort level:   Encourage patient to monitor pain and request assistance   Assess pain using appropriate pain scale   Administer analgesics based on type and severity of pain and evaluate response   Implement non-pharmacological measures as appropriate and evaluate response   Consider cultural and social influences on pain and pain management     Problem: Safety - Adult  Goal: Free from fall injury  9/26/2023 1517 by Stevenson Helm RN  Outcome: Adequate for Discharge  9/26/2023 1517 by Stevenson Helm RN  Outcome: Progressing

## 2023-09-26 NOTE — CARE COORDINATION
Case Management Assessment            Discharge Note                    Date / Time of Note: 9/26/2023 3:05 PM                  Discharge Note Completed by: WESLEY Ca    Patient Name: Regina Dahl   YOB: 1955  Diagnosis: Seizure [facility]) [R56.9]   Date / Time: 9/21/2023  3:37 AM    Current PCP: [place] patient: No    Hospitalization in the last 30 days: No       Advance Directives:  Code Status: Full Code  [place] DNR form completed and on chart: Not Indicated    Financial:  Payor: Sultana Meza / Plan: [address] Mendez[address] A / Product Type: *No Product type* /      Pharmacy:    [address] [place] [address] In[place], [address] [place] [address] [address] [facility] [phone]  [address] [place] [address] [facility] [address]  Phone: [phone] Fax: [phone]      Assistance purchasing medications?: Potential Assistance Purchasing Medications: No  Assistance provided by Case Management: None at this time    Does patient want to participate in local refill/ meds to beds program?: Yes    Meds To Beds General Rules:  1. Can ONLY be done Monday- Friday between 8:30am-5pm  2. Prescription(s) must be in pharmacy by 3pm to be filled same day  3. Copy of patient's insurance/ prescription drug card and patient face sheet must be sent along with the prescription(s)  4. Cost of Rx cannot be added to hospital bill. If financial assistance is needed, please contact unit  or ;  or  CANNOT provide pharmacy voucher for patients co-pays  5.  Patients can then  the prescription on their way out of the hospital at discharge, or pharmacy can deliver to the bedside if staff is available. (payment due at time of pick-up or delivery - cash, check, or card accepted)     Able to afford home medications/ co-pay costs: Yes    ADLS:  Current PT AM-PAC Score: 23 /24  Current OT AM-PAC Score: 17 /24      DISCHARGE Disposition: Home- No

## 2023-09-26 NOTE — PLAN OF CARE
Problem: Discharge Planning  Goal: Discharge to home or other facility with appropriate resources  Outcome: Progressing     Problem: Safety - Adult  Goal: Free from fall injury  Outcome: Progressing     Problem: Pain  Goal: Verbalizes/displays adequate comfort level or baseline comfort level  Outcome: Progressing  Flowsheets (Taken 9/25/2023 0398)  Verbalizes/displays adequate comfort level or baseline comfort level:   Encourage patient to monitor pain and request assistance   Assess pain using appropriate pain scale   Administer analgesics based on type and severity of pain and evaluate response   Implement non-pharmacological measures as appropriate and evaluate response   Consider cultural and social influences on pain and pain management

## 2023-09-27 LAB — VDRL CSF QL: NON REACTIVE

## 2023-09-28 LAB
HSV1 DNA CSF QL NAA+PROBE: NOT DETECTED
HSV2 DNA CSF QL NAA+PROBE: NOT DETECTED
OLIGOCLONAL BANDS CSF QL: 0
OLIGOCLONAL BANDS SERPL QL: 0
PROT PATTERN CSF ELPH-IMP: NORMAL
SPECIMEN SOURCE: NORMAL

## 2023-10-01 LAB
BACTERIA CSF CULT: ABNORMAL
GRAM STN SPEC: ABNORMAL
ORGANISM: ABNORMAL

## 2024-10-02 ENCOUNTER — CLINICAL DOCUMENTATION (OUTPATIENT)
Dept: PHYSICAL THERAPY | Age: 69
End: 2024-10-02

## 2024-10-02 ENCOUNTER — HOSPITAL ENCOUNTER (INPATIENT)
Age: 69
LOS: 10 days | Discharge: ANOTHER HOSPITAL | DRG: 057 | End: 2024-10-12
Attending: STUDENT IN AN ORGANIZED HEALTH CARE EDUCATION/TRAINING PROGRAM | Admitting: INTERNAL MEDICINE
Payer: COMMERCIAL

## 2024-10-02 PROBLEM — I63.9 ACUTE CEREBROVASCULAR ACCIDENT (CVA) DUE TO ISCHEMIA (HCC): Status: ACTIVE | Noted: 2024-10-02

## 2024-10-02 PROCEDURE — 1280000000 HC REHAB R&B

## 2024-10-02 PROCEDURE — 6370000000 HC RX 637 (ALT 250 FOR IP): Performed by: STUDENT IN AN ORGANIZED HEALTH CARE EDUCATION/TRAINING PROGRAM

## 2024-10-02 PROCEDURE — 6360000002 HC RX W HCPCS: Performed by: STUDENT IN AN ORGANIZED HEALTH CARE EDUCATION/TRAINING PROGRAM

## 2024-10-02 RX ORDER — DEXAMETHASONE 4 MG/1
4 TABLET ORAL EVERY 8 HOURS
Status: COMPLETED | OUTPATIENT
Start: 2024-10-02 | End: 2024-10-02

## 2024-10-02 RX ORDER — OXYCODONE HYDROCHLORIDE 5 MG/1
5 TABLET ORAL EVERY 4 HOURS PRN
Status: DISCONTINUED | OUTPATIENT
Start: 2024-10-02 | End: 2024-10-12 | Stop reason: HOSPADM

## 2024-10-02 RX ORDER — SENNA AND DOCUSATE SODIUM 50; 8.6 MG/1; MG/1
2 TABLET, FILM COATED ORAL 2 TIMES DAILY
Status: DISCONTINUED | OUTPATIENT
Start: 2024-10-02 | End: 2024-10-03

## 2024-10-02 RX ORDER — ONDANSETRON 4 MG/1
4 TABLET, ORALLY DISINTEGRATING ORAL EVERY 8 HOURS PRN
Status: DISCONTINUED | OUTPATIENT
Start: 2024-10-02 | End: 2024-10-12

## 2024-10-02 RX ORDER — LEVETIRACETAM 500 MG/1
1000 TABLET, FILM COATED, EXTENDED RELEASE ORAL DAILY
Status: DISCONTINUED | OUTPATIENT
Start: 2024-10-03 | End: 2024-10-03

## 2024-10-02 RX ORDER — AMOXICILLIN 250 MG
1 CAPSULE ORAL 2 TIMES DAILY
Status: DISCONTINUED | OUTPATIENT
Start: 2024-10-02 | End: 2024-10-02 | Stop reason: CLARIF

## 2024-10-02 RX ORDER — DEXAMETHASONE 4 MG/1
4 TABLET ORAL DAILY
Status: COMPLETED | OUTPATIENT
Start: 2024-10-06 | End: 2024-10-08

## 2024-10-02 RX ORDER — POLYETHYLENE GLYCOL 3350 17 G/17G
17 POWDER, FOR SOLUTION ORAL DAILY PRN
Status: DISCONTINUED | OUTPATIENT
Start: 2024-10-02 | End: 2024-10-12 | Stop reason: SDUPTHER

## 2024-10-02 RX ORDER — ASPIRIN 81 MG/1
81 TABLET ORAL
Status: DISCONTINUED | OUTPATIENT
Start: 2024-10-03 | End: 2024-10-12 | Stop reason: HOSPADM

## 2024-10-02 RX ORDER — ATORVASTATIN CALCIUM 40 MG/1
40 TABLET, FILM COATED ORAL NIGHTLY
Status: DISCONTINUED | OUTPATIENT
Start: 2024-10-02 | End: 2024-10-12 | Stop reason: HOSPADM

## 2024-10-02 RX ORDER — LANOLIN ALCOHOL/MO/W.PET/CERES
3 CREAM (GRAM) TOPICAL NIGHTLY PRN
Status: DISCONTINUED | OUTPATIENT
Start: 2024-10-02 | End: 2024-10-12 | Stop reason: HOSPADM

## 2024-10-02 RX ORDER — ACETAMINOPHEN 325 MG/1
650 TABLET ORAL EVERY 4 HOURS PRN
Status: DISCONTINUED | OUTPATIENT
Start: 2024-10-02 | End: 2024-10-12 | Stop reason: SDUPTHER

## 2024-10-02 RX ORDER — HEPARIN SODIUM 5000 [USP'U]/ML
5000 INJECTION, SOLUTION INTRAVENOUS; SUBCUTANEOUS EVERY 8 HOURS SCHEDULED
Status: DISCONTINUED | OUTPATIENT
Start: 2024-10-02 | End: 2024-10-12 | Stop reason: HOSPADM

## 2024-10-02 RX ORDER — SCOLOPAMINE TRANSDERMAL SYSTEM 1 MG/1
1 PATCH, EXTENDED RELEASE TRANSDERMAL
Status: DISCONTINUED | OUTPATIENT
Start: 2024-10-04 | End: 2024-10-03

## 2024-10-02 RX ORDER — LACOSAMIDE 100 MG/1
200 TABLET ORAL 2 TIMES DAILY
Status: DISCONTINUED | OUTPATIENT
Start: 2024-10-02 | End: 2024-10-12 | Stop reason: HOSPADM

## 2024-10-02 RX ORDER — HYDRALAZINE HYDROCHLORIDE 10 MG/1
10 TABLET, FILM COATED ORAL EVERY 8 HOURS PRN
Status: DISCONTINUED | OUTPATIENT
Start: 2024-10-02 | End: 2024-10-12 | Stop reason: HOSPADM

## 2024-10-02 RX ORDER — OXYCODONE HYDROCHLORIDE 5 MG/1
10 TABLET ORAL EVERY 4 HOURS PRN
Status: DISCONTINUED | OUTPATIENT
Start: 2024-10-02 | End: 2024-10-12 | Stop reason: HOSPADM

## 2024-10-02 RX ORDER — DEXAMETHASONE 4 MG/1
4 TABLET ORAL EVERY 12 HOURS SCHEDULED
Status: COMPLETED | OUTPATIENT
Start: 2024-10-03 | End: 2024-10-05

## 2024-10-02 RX ADMIN — ACETAMINOPHEN 650 MG: 325 TABLET ORAL at 21:08

## 2024-10-02 RX ADMIN — HEPARIN SODIUM 5000 UNITS: 5000 INJECTION INTRAVENOUS; SUBCUTANEOUS at 21:08

## 2024-10-02 RX ADMIN — DEXAMETHASONE 4 MG: 4 TABLET ORAL at 21:08

## 2024-10-02 RX ADMIN — ATORVASTATIN CALCIUM 40 MG: 40 TABLET, FILM COATED ORAL at 21:08

## 2024-10-02 RX ADMIN — LACOSAMIDE 200 MG: 100 TABLET, FILM COATED ORAL at 21:08

## 2024-10-02 ASSESSMENT — PAIN SCALES - GENERAL
PAINLEVEL_OUTOF10: 5
PAINLEVEL_OUTOF10: 3

## 2024-10-02 ASSESSMENT — PAIN DESCRIPTION - DESCRIPTORS: DESCRIPTORS: ACHING

## 2024-10-02 ASSESSMENT — PAIN DESCRIPTION - LOCATION: LOCATION: HEAD

## 2024-10-02 ASSESSMENT — PAIN DESCRIPTION - ORIENTATION: ORIENTATION: RIGHT

## 2024-10-02 NOTE — PLAN OF CARE
[x]  w/c mobility and training  [x]  self care    []home mgmt    [x]  cognitive training            []  energy conservation        [x]  dysphagia tx    []  speech/language/communication therapy   [x]  group therapy    [x]  patient/family education    [] Other:    CASE MANAGEMENT:  Goals:  Assist patient/family with discharge planning, patient/family counseling, and coordination with insurance during ARU stay.       Rita Dixon will be seen a minimum of 3 hours of therapy per day, a minimum of 5 out of 7 days per week  (please see above for specific treatment plan per PT/OT/SLP).    [] In this rare instance due to the nature of this patient's medical involvement, this patient will be seen 15 hours per week (900 minutes within a 7 day period).    In addition, dietician/nutritionist may monitor calorie count as well as intake and collaboratively work with SLP on dietary upgrades.  Neuropsychology/Psychology may evaluate and provide necessary support.    Medical issues being managed closely and that require 24 hour availability of a physician:  [x] Swallowing Precautions  [x] Bowel/Bladder Fx  [] Weight bearing precautions  [x] Wound Care    [x] Pain Mgmt   [x] Infection Protection  [x] DVT Prophylaxis   [x] Fall Precautions  [x] Fluid/Electrolyte/Nutrition Balance  [] Voice Protection   [] Respiratory  [] Other:    Medical Prognosis: [] Good  [] Fair    [x] Guarded   Total expected IRF days: 21  Anticipated discharge destination:   [] Home Independently   [] Home with supervision    []SNF     [x] Other : TBD pending progress                                          Physician anticipated functional outcomes:  By discharge, the patient will progress towards prior level of function and demonstrate mobility and ADLs with CGA-Phillip     IPOC brief synthesis: 68 y.o. female with PMHx notable for hypertension, GBM s/p multiple resection (2/29/23, 9/24/24), focal epilepsy who presented to [facility] on 9/28/24 with left-sided  weakness.  Following her recent craniotomy for resection of right anterolateral temporal lobe GBM she developed generalized tonic-clonic seizure, with some left-sided weakness.  Weakness improved and patient was able to return home on 9/27, however she returned on 9/28 with worsening left-sided weakness.  MRI brain confirmed acute infarct involving the right basal ganglia and corona radiata.  Neurology was consulted, recommended permissive hypertension noting a pressure dependent exam, so home blood pressure medications were held.  She was continued on her home antiepileptics, as well as a dexamethasone taper per neurosurgery.       I have reviewed this initial plan of care and agree with its contents:    Title   Name    Date    Time    Physician: Bassem Mueller MD 10/04/24 3:04 PM      Case Mgmt:  Inez Klein MSW on 10/3/24 at 7:49 AM EDT     OT: Judith Jones, ALESHA OTR/L 10/3/24 1527     PT: Franki Tracey, DPT [id number] 10/3/2024 6283    RN: DIEGO LuN, CRRN, 10/2/24, 9250    ST: Rafa Mar MA, CCC-SLP 10/3/24 1034    : Aurelio Roche, PT, DPT [id number], 10/4/2024, 9:17 AM

## 2024-10-02 NOTE — CARE COORDINATION
External Admission Medical Chart Summary For :  Rita Dixon  (All information taken from discharging hospital medical chart)    HPI:  Patient is a 68-year-old female who presented to the ED of an OSH on 9/28 for complaint of recurring weakness of her left hemibody.  Patient has a PMHx: arthritis, GBM, HTN, Kidney Stone, Osteoporosis, and Seizures and a PSHx:  Colonoscopy, Craniotomy, Eyes surgery, T&A and [place] Tooth extraction.   Patient was recently discharged from hospital 2/2 to GBM tumor progression and underwent right frontotemporal craniotomy for resection of tumor on 9/24 per Neurosurgery.   Patient had an abnormal EEG on 9/25.  Patient was d/c on 9/27.  On 9/28 patient was experiencing progressive weakness with neurological changes and returned to the hospital.  Patient was demonstrating neurological changes, and a consult was placed to Neurology with a pending MRI Brain.  On 9/29 MRI Brain was significant for Acute infarct with associated FLAIR signal abnormality in the right basal ganglia and corona radiata and acute CVA was confirmed per Neurology.  On 9/29 SLP evaluated patient with successive 3oz thin liquid challenge administered and patient completed with overt difficulty with a possible FEES pending.  A FEES on 9/29 was WNL.  On 9/30 patient was evaluated by PT/OT and due to       Decreased IADLs, Decreased activity tolerance, Decreased Functional Mobility, Decreased Balance, Decreased ADL status, Visual deficit, Decreased fine motor control, Decreased UE strength, Decreased UE ROM, Decreased self-care transfers,  Impaired Bed Mobility, Impaired Transfers, Impaired Gait, Impaired Balance, Impaired Strength, Impaired Cognition, Impaired Activity Tolerance, Deconditioning, Impaired Safety Awareness  IPU was recommended as the next appropriate level of care.  On 9/30 an ARU RN CL reviewed patient’s case and presented to a physiatrist and patient was found appropriate and able to tolerate/participate

## 2024-10-02 NOTE — PROGRESS NOTES
Patient was admitted to room 4905 at 1755.  Patient was oriented to the Call Light, Phone, TV, Thermostat, Bed Controls, Bathroom and Emergency Cord.  Patient verbalized and demonstrated understanding of all.  Patient was also given an overview of Unit Routines for Acute Rehab, including the patient's rights and responsibilities as well as the CMS IRF BILLIE Privacy Act Statement providing notice of data collection.  Patient states that their normal bowel regime is daily. Meal times were explained, including how to order food.  The white board, (which is posted on the wall by the door is used for communication) has the Therapy Scheduled that is posted each day along with the name of your doctor, nurse, and therapist for your convenience.  We recommend any family that will be care givers or any care givers the patient has, take part in therapy.  We have no set visiting hours, we suggest non-caregiver friends and family visitors come after therapy (at 4 PM or later) to allow patient to rest in between sessions.      In conjunction with the patient and patient’s family, this nurse worked to establish a tailored Fall TIPS plan to ensure patient safety and compliance:    Falls TIPS Completion    Patient identified as increased risk for harm if fall:  [] Yes     Fall Risks  History of Falls:    [] Yes   Medication Side Effects:   [] Yes   Walking Aid:    [x] Yes   IV Pole or Equipment:   [] Yes   Unsteady Walk:     [x] Yes   May Forget or Choose Not to Call: [] Yes     Fall Interventions   Communicate Recent Fall and/or Risk of Harm: [] Yes   Walking Aids:  Crutches: [] Yes   Cane: [] Yes   Walker: [x] Yes   IV Assistance When Walking: [] Yes   Toileting Schedule: Every 1 Hours  Bedpan:   [] Yes   Assist to Commode: [] Yes   Assist to Bathroom: [x] Yes   Bed Alarm On: [] Yes   Assistance Out of Bed:  Bedrest: [] Yes   1 Person: [] Yes   2 People: [x] Yes

## 2024-10-02 NOTE — PROGRESS NOTES
4 Eyes Skin Assessment     NAME:  Rita Dixon  YOB: 1955  MEDICAL RECORD NUMBER:  [id number]    The patient is being assessed for  Admission    I agree that at least one RN has performed a thorough Head to Toe Skin Assessment on the patient. ALL assessment sites listed below have been assessed.      Areas assessed by both nurses:    Head, Face, Ears, Shoulders, Back, Chest, Arms, Elbows, Hands, Sacrum. Buttock, Coccyx, Ischium, Legs. Feet and Heels, and Under Medical Devices         Does the Patient have a Wound? Yes wound(s) were present on assessment. LDA wound assessment was Initiated and completed by RN       Jim Prevention initiated by RN: Yes  Wound Care Orders initiated by RN: No    Pressure Injury (Stage 3,4, Unstageable, DTI, NWPT, and Complex wounds) if present, place Wound referral order by RN under : No    New Ostomies, if present place, Ostomy referral order under : No     Nurse 1 eSignature: Electronically signed by Juvencio Elaine RN on 10/2/24 at 6:40 PM EDT    **SHARE this note so that the co-signing nurse can place an eSignature**    Nurse 2 eSignature: Electronically signed by Meenakshi Gaona RN on 10/2/24 at 6:45 PM EDT

## 2024-10-02 NOTE — PROGRESS NOTES
ARU Admission Assessment    Ethnicity  \"Are you of , /a, or Ivorian origin?\"  Check all that apply:  [x] A.  No, not of , /a, or Ivorian Origin  [] B.  Yes, Sao Tomean, [place] American, Chicano/a  [] C.  Yes, Colombian  [] D.  Yes, Boo  [] E.  Yes, another , , or Ivorian origin  [] X.  Patient unable to respond  [] Y.  Patient declines to respond    Race  \"What is your race?\"  Check all that apply:  [x] A.  White  [] B.  Black or   [] C.   or   [] D.   Guinean  [] E.  Chinese  [] F.  Vietnamese  [] G. South Sudanese  [] H.  Swedish  [] I.  Setswana  [] J.  Other   [] K.    [] L.  Tuvaluan or Luis Manuel  [] M.  Italian  [] N.  Other   [] X.  Patient unable to respond  [] Y.  Patient declines to respond  [] Z.  None of the above    Language  A.  \"What is your preferred language?\"   English    B.  \"Do you need or want an  to communicate with a doctor or health care staff?\"  Check only one:  [x] 0.  No  [] 1.  Yes  [] 9.  Unable to determine    Transportation  \"Has lack of transportation kept you from medical appointments, meetings, work, or from getting things needed for daily living?\"Check all that apply:  [] A.  Yes, it has kept me from medical appointments or from getting my medications  [] B.  Yes, it has kept me from non-medical meetings, appointments, work, or from getting things that I need  [x] C.  No  [] X.  Patient unable to respond  [] Y.  Patient declines to respond    Hearing  Ability to hear (with hearing aid or hearing appliances if normally used)  [x]  0.  Adequate - no difficulty in normal conversation, social interaction, listening to TV  []  1.  Minimal difficulty - difficulty in some environments (e.g. when person speaks softly or setting is noisy)  []  2.  Moderate difficulty - speaker has to increase volume and speak distinctly   []  3.  Highly impaired - absence of  score 0-27, or enter 99 if unable to complete (if symptom frequency (column 2) is blank for 3 or more items).   1     Social Isolation  \"How often do you feel lonely or isolated from those around you?\"  [] 0.  Never  [] 1.  Rarely  [x] 2.  Sometimes  [] 3.  Often  [] 4.  Always  [] 7.  Patient declines to respond  [] 8.  Patient unable to respond    Pain Effect on Sleep  \"Over the past 5 days, how much of the time has pain made it hard for you to sleep at night?\"  []  0.  Does not apply - I have not had any pain or hurting in the past 5 days  [x]  1.  Rarely or not at all  []  2.  Occasionally  []  3.  Frequently  []  4.  Almost constantly  []  8.  Unable to answer    **If the patient answers \"0.  Does not apply\" to this question, skip the next two \"Pain Effect...\" questions**    Pain Interference with Therapy Activities  \"Over the past 5 days, how often have you limited your participation in rehabilitation therapy sessions due to pain?\"  []  0.  Does not apply - I have not received rehabilitation therapy in the past 5 days  [x]  1.  Rarely or not at all  []  2.  Occasionally  []  3.  Frequently  []  4.  Almost constantly  []  8.  Unable to answer    Pain Interference with Day-to-Day Activities:  \"Over the past 5 days, how often have you limited your day-to-day activities (excluding rehabilitation therapy session)?\"  [x]  1.  Rarely or not at all  []  2.  Occasionally  []  3.  Frequently  []  4.  Almost constantly  []  8.  Unable to answer    Nutritional Approaches  Check all of the following nutritional approaches that apply on admission:  []  A.  Parenteral/IV feeding (including IV fluids if needed for hydration, but not as part of dialysis/chemo)  []  B.  Feeding tube (e.g., nasogastric or abdominal (PEG))  []  C.  Mechanically altered diet - requires change in texture of food or liquids (e.g., pureed food, thickened liquids)  []  D.  Therapeutic diet (e.g., low salt, diabetic, low cholesterol)  [x]  Z.  None of

## 2024-10-03 LAB
ANION GAP SERPL CALCULATED.3IONS-SCNC: 12 MMOL/L (ref 3–16)
BASOPHILS # BLD: 0 K/UL (ref 0–0.2)
BASOPHILS NFR BLD: 0 %
BUN SERPL-MCNC: 31 MG/DL (ref 7–20)
CALCIUM SERPL-MCNC: 8.7 MG/DL (ref 8.3–10.6)
CHLORIDE SERPL-SCNC: 105 MMOL/L (ref 99–110)
CO2 SERPL-SCNC: 21 MMOL/L (ref 21–32)
CREAT SERPL-MCNC: 0.7 MG/DL (ref 0.6–1.2)
DEPRECATED RDW RBC AUTO: 13.4 % (ref 12.4–15.4)
EOSINOPHIL # BLD: 0 K/UL (ref 0–0.6)
EOSINOPHIL NFR BLD: 0 %
GFR SERPLBLD CREATININE-BSD FMLA CKD-EPI: >90 ML/MIN/{1.73_M2}
GLUCOSE SERPL-MCNC: 122 MG/DL (ref 70–99)
HCT VFR BLD AUTO: 34.1 % (ref 36–48)
HGB BLD-MCNC: 11.6 G/DL (ref 12–16)
LYMPHOCYTES # BLD: 1 K/UL (ref 1–5.1)
LYMPHOCYTES NFR BLD: 11 %
MCH RBC QN AUTO: 34.2 PG (ref 26–34)
MCHC RBC AUTO-ENTMCNC: 33.9 G/DL (ref 31–36)
MCV RBC AUTO: 100.9 FL (ref 80–100)
METAMYELOCYTES NFR BLD MANUAL: 1 %
MONOCYTES # BLD: 0.7 K/UL (ref 0–1.3)
MONOCYTES NFR BLD: 8 %
MYELOCYTES NFR BLD MANUAL: 1 %
NEUTROPHILS # BLD: 7.4 K/UL (ref 1.7–7.7)
NEUTROPHILS NFR BLD: 79 %
PLATELET # BLD AUTO: 165 K/UL (ref 135–450)
PLATELET BLD QL SMEAR: ADEQUATE
PMV BLD AUTO: 7.2 FL (ref 5–10.5)
POTASSIUM SERPL-SCNC: 4.7 MMOL/L (ref 3.5–5.1)
RBC # BLD AUTO: 3.38 M/UL (ref 4–5.2)
RBC MORPH BLD: NORMAL
SLIDE REVIEW: ABNORMAL
SODIUM SERPL-SCNC: 138 MMOL/L (ref 136–145)
WBC # BLD AUTO: 9.1 K/UL (ref 4–11)

## 2024-10-03 PROCEDURE — 97162 PT EVAL MOD COMPLEX 30 MIN: CPT

## 2024-10-03 PROCEDURE — 97535 SELF CARE MNGMENT TRAINING: CPT

## 2024-10-03 PROCEDURE — 97530 THERAPEUTIC ACTIVITIES: CPT

## 2024-10-03 PROCEDURE — 92610 EVALUATE SWALLOWING FUNCTION: CPT

## 2024-10-03 PROCEDURE — 97166 OT EVAL MOD COMPLEX 45 MIN: CPT

## 2024-10-03 PROCEDURE — 6360000002 HC RX W HCPCS: Performed by: STUDENT IN AN ORGANIZED HEALTH CARE EDUCATION/TRAINING PROGRAM

## 2024-10-03 PROCEDURE — 80048 BASIC METABOLIC PNL TOTAL CA: CPT

## 2024-10-03 PROCEDURE — 1280000000 HC REHAB R&B

## 2024-10-03 PROCEDURE — 92526 ORAL FUNCTION THERAPY: CPT

## 2024-10-03 PROCEDURE — 6370000000 HC RX 637 (ALT 250 FOR IP): Performed by: STUDENT IN AN ORGANIZED HEALTH CARE EDUCATION/TRAINING PROGRAM

## 2024-10-03 PROCEDURE — 92523 SPEECH SOUND LANG COMPREHEN: CPT

## 2024-10-03 PROCEDURE — 85025 COMPLETE CBC W/AUTO DIFF WBC: CPT

## 2024-10-03 RX ORDER — LEVETIRACETAM 500 MG/1
1000 TABLET, FILM COATED, EXTENDED RELEASE ORAL DAILY
Status: ON HOLD | COMMUNITY
End: 2024-10-14

## 2024-10-03 RX ORDER — SENNA AND DOCUSATE SODIUM 50; 8.6 MG/1; MG/1
2 TABLET, FILM COATED ORAL DAILY
Status: DISCONTINUED | OUTPATIENT
Start: 2024-10-04 | End: 2024-10-12 | Stop reason: HOSPADM

## 2024-10-03 RX ORDER — ATORVASTATIN CALCIUM 40 MG/1
40 TABLET, FILM COATED ORAL DAILY
Status: ON HOLD | COMMUNITY

## 2024-10-03 RX ORDER — LACOSAMIDE 50 MG/1
200 TABLET ORAL 2 TIMES DAILY
Status: ON HOLD | COMMUNITY

## 2024-10-03 RX ORDER — LEVETIRACETAM 100 MG/ML
500 SOLUTION ORAL 2 TIMES DAILY
Status: DISCONTINUED | OUTPATIENT
Start: 2024-10-03 | End: 2024-10-12 | Stop reason: HOSPADM

## 2024-10-03 RX ORDER — ASPIRIN 81 MG/1
81 TABLET ORAL DAILY
Status: ON HOLD | COMMUNITY

## 2024-10-03 RX ADMIN — HEPARIN SODIUM 5000 UNITS: 5000 INJECTION INTRAVENOUS; SUBCUTANEOUS at 21:37

## 2024-10-03 RX ADMIN — DEXAMETHASONE 4 MG: 4 TABLET ORAL at 21:37

## 2024-10-03 RX ADMIN — LEVETIRACETAM 500 MG: 100 SOLUTION ORAL at 11:31

## 2024-10-03 RX ADMIN — LEVETIRACETAM 1000 MG: 500 TABLET, FILM COATED, EXTENDED RELEASE ORAL at 09:41

## 2024-10-03 RX ADMIN — ACETAMINOPHEN 650 MG: 325 TABLET ORAL at 05:47

## 2024-10-03 RX ADMIN — LACOSAMIDE 200 MG: 100 TABLET, FILM COATED ORAL at 09:35

## 2024-10-03 RX ADMIN — HEPARIN SODIUM 5000 UNITS: 5000 INJECTION INTRAVENOUS; SUBCUTANEOUS at 14:12

## 2024-10-03 RX ADMIN — LEVETIRACETAM 500 MG: 100 SOLUTION ORAL at 21:36

## 2024-10-03 RX ADMIN — DEXAMETHASONE 4 MG: 4 TABLET ORAL at 05:47

## 2024-10-03 RX ADMIN — LACOSAMIDE 200 MG: 100 TABLET, FILM COATED ORAL at 21:36

## 2024-10-03 RX ADMIN — OXYCODONE 5 MG: 5 TABLET ORAL at 21:36

## 2024-10-03 RX ADMIN — ASPIRIN 81 MG: 81 TABLET, COATED ORAL at 09:34

## 2024-10-03 RX ADMIN — ATORVASTATIN CALCIUM 40 MG: 40 TABLET, FILM COATED ORAL at 21:37

## 2024-10-03 RX ADMIN — HEPARIN SODIUM 5000 UNITS: 5000 INJECTION INTRAVENOUS; SUBCUTANEOUS at 05:48

## 2024-10-03 ASSESSMENT — PAIN DESCRIPTION - INTENSITY: RATING_2: 5

## 2024-10-03 ASSESSMENT — PAIN SCALES - WONG BAKER: WONGBAKER_NUMERICALRESPONSE: NO HURT

## 2024-10-03 ASSESSMENT — PAIN SCALES - GENERAL
PAINLEVEL_OUTOF10: 0
PAINLEVEL_OUTOF10: 3
PAINLEVEL_OUTOF10: 5
PAINLEVEL_OUTOF10: 6

## 2024-10-03 ASSESSMENT — PAIN DESCRIPTION - DESCRIPTORS
DESCRIPTORS: ACHING
DESCRIPTORS_2: OTHER (COMMENT)
DESCRIPTORS: ACHING

## 2024-10-03 ASSESSMENT — PAIN DESCRIPTION - FREQUENCY: FREQUENCY: CONTINUOUS

## 2024-10-03 ASSESSMENT — PAIN DESCRIPTION - ORIENTATION
ORIENTATION: RIGHT
ORIENTATION: POSTERIOR;RIGHT;ANTERIOR
ORIENTATION_2: LEFT

## 2024-10-03 ASSESSMENT — PAIN DESCRIPTION - LOCATION
LOCATION_2: LEG
LOCATION: HEAD
LOCATION: HEAD

## 2024-10-03 ASSESSMENT — PAIN DESCRIPTION - PAIN TYPE: TYPE: ACUTE PAIN

## 2024-10-03 ASSESSMENT — PAIN - FUNCTIONAL ASSESSMENT
PAIN_FUNCTIONAL_ASSESSMENT_SITE2: ACTIVITIES ARE NOT PREVENTED
PAIN_FUNCTIONAL_ASSESSMENT: ACTIVITIES ARE NOT PREVENTED

## 2024-10-03 ASSESSMENT — PAIN DESCRIPTION - ONSET: ONSET: GRADUAL

## 2024-10-03 NOTE — PROGRESS NOTES
[facility] - Inpatient Rehabilitation Department   Phone: [phone]    Speech Therapy    [x] Initial Evaluation            [] Daily Treatment Note         [] Discharge Summary      Patient: Rita Dixon   : 1955   MRN: [id number]   Date of Service:  10/3/2024  Admitting Diagnosis: Acute cerebrovascular accident (CVA) due to ischemia (HCC)  Current Admission Summary: See rehab MD note  Past Medical History:  has a past medical history of Arthritis, GBM (glioblastoma multiforme) (HCC), HTN (hypertension), Kidney stones, Osteoporosis, and Seizures (HCC).  Past Surgical History:  has a past surgical history that includes brain surgery (Right, 2023); Eye surgery (Left); and craniotomy (Right, 2024).  Recent MRI Brain: 24  1. Acute infarct with associated FLAIR signal abnormality in the right basal ganglia and corona radiata. No significant midline shift or evidence of hemorrhagic conversion.   2. Mild diffusion restriction in the right hippocampus may represent sequela of seizures or ischemia.   3. Postoperative changes of right anterior temporal mass resection. Punctate foci of enhancement at the resection site are favored postsurgical, with attention on follow-up recommended.   4. Unchanged trace intraventricular hemorrhage with unchanged ventricular size and configuration.   Instrumental Swallow Study: 24  Patient presents with grossly normal oropharyngeal swallow function with observed flash laryngeal penetration of thin liquids and puree intermittently which fully clears during the swallow. There is no significant post-swallow residue in the pharynx. Mastication is timely and effective, with adequate oral clearance. Swallow safety and swallow efficiency are grossly WFL.   Precautions/Restrictions: high fall risk        Pre-Admission Information   Living Status: Pt lives at home with  who manages medications and finances. Pt previously assisted with simple    Impairment Severity:Moderate   Impaired Sustained Attention  Impaired Alternating Attention  Impaired Divided Attention  Memory:   Impairment Severity:Mild  and Moderate   Impaired Short-term Memory  Impaired Recall of New Learning   Impaired Immediate/working Memory  Problem Solving:   Impairment Severity:Moderate   Impaired Complex Tasks   Safety/Judgement/Insight:   Impairment Severity:Moderate   Impaired Insight    Additional Assessment   Pt was assessed using the Brief Cognitive Assessment Tool (BCAT).     COGNITIVE DOMAIN Pt Score Points Possible  COMMENTS   ORIENTATION  5 6    IMMEDIATE VERBAL RECALL 4 4    VISUAL RECOGNITION/NAMING 3 3    ATTENTION 3 7    ABSTRACTION 3 3    LANGUAGE 2 3    EXECUTIVE  0 4    VISUOSPATIAL  1 4    DELAYED VERBAL RECALL 2 4    IMMEDIATE STORY RECALL 2 2    DELAYED VISUAL MEMORY  1 3    DELAYED STORY RECALL 1 2    STORY RECOGNITION  3 5    SUM 30 50        BCAT CROSSWALK TO FUNCTIONAL STATUS:   Pt's cumulative score indicates MILD DEMENTIA.  MILD DEMENTIA (score range 26-34): IADL deficits; typically requires residential support services; clear objective evidence of memory and other cognitive declines.     * Of note, Pt does not have a formal dementia diagnosis. The above score and cognitive stage is not indicative of diagnosis rather used to direct treatment tasks and establish goals.         Education  Barriers To Learning: cognition and visual  Patient Education: Provided education regarding role of SLP, results of assessment, recommendations and general speech pathology plan of care.   Learning Assessment: Pt verbalized understanding   Pt requires ongoing learning     Assessment  Impairments Requiring Therapeutic Intervention: Cognitive-Linguistic Deficits  and Oropharyngeal Dysphagia   Prognosis: fair    Clinical Assessment: Pt presents with moderate cognitive-linguistic deficits within the domains of immediate and short-term memory recall, attention, problem solving and

## 2024-10-03 NOTE — PROGRESS NOTES
Assessments completed. Alert, oriented x4 calm and cooperative with care and medications.  spent the evening with her. Requesting PRN Tylenol at HS for intermittent headache to right eyebrow area. Medications effective. Purewick in place per pt. Request, clear yellow urine noted. Patient was tearful at HS, expressing sadness and feelings of hopelessness with her current health struggles and situation. Reassurance and 1:1 provided. Assisted up to restroom with x2 staff, gait belt, and pivot to wheelchair. Patient had a very difficult time with transfer, minimal upper or lower body strength on right side with flaccid left side requiring full support from staff. Assisted to reposition frequently through the night. In bed, alarm on, bed in lowest position, call light and table within reach. No further needs expressed at this time.

## 2024-10-03 NOTE — PLAN OF CARE
Problem: Discharge Planning  Goal: Discharge to home or other facility with appropriate resources  Outcome: Progressing  Flowsheets  Taken 10/2/2024 2045 by Génesis Tan RN  Discharge to home or other facility with appropriate resources:   Identify barriers to discharge with patient and caregiver   Refer to discharge planning if patient needs post-hospital services based on physician order or complex needs related to functional status, cognitive ability or social support system  Taken 10/2/2024 1953 by Juvencio Elaine RN  Discharge to home or other facility with appropriate resources: Identify discharge learning needs (meds, wound care, etc)     Problem: Chronic Conditions and Co-morbidities  Goal: Patient's chronic conditions and co-morbidity symptoms are monitored and maintained or improved  Outcome: Progressing  Flowsheets (Taken 10/2/2024 2045)  Care Plan - Patient's Chronic Conditions and Co-Morbidity Symptoms are Monitored and Maintained or Improved:   Monitor and assess patient's chronic conditions and comorbid symptoms for stability, deterioration, or improvement   Collaborate with multidisciplinary team to address chronic and comorbid conditions and prevent exacerbation or deterioration   Update acute care plan with appropriate goals if chronic or comorbid symptoms are exacerbated and prevent overall improvement and discharge     Problem: Safety - Adult  Goal: Free from fall injury  Outcome: Progressing     Problem: Pain  Goal: Verbalizes/displays adequate comfort level or baseline comfort level  Outcome: Progressing     Problem: Skin/Tissue Integrity  Goal: Absence of new skin breakdown  Description: 1.  Monitor for areas of redness and/or skin breakdown  2.  Assess vascular access sites hourly  3.  Every 4-6 hours minimum:  Change oxygen saturation probe site  4.  Every 4-6 hours:  If on nasal continuous positive airway pressure, respiratory therapy assess nares and determine need for appliance

## 2024-10-03 NOTE — PROGRESS NOTES
Admission Period/Goal QM Codes for Rita Dixon.    QM Admit Code Goal Code   Eating 5 6   Oral Hygiene 3 6   Toileting Hygiene 1 4   Shower/Bathing 1 4   UB Dressing 1 6   LB Dressing 1 4   Putting on/off Footwear 1 6   Rolling Left and Right 2 3   Sit To Lying 1 3   Lying to Sitting on Bedside 1 3   Sit to Stand 1 3   Chair/Bed to Chair Transfer 1 3   Toilet Transfers 1 4   Car Transfers 88 3   Walk 10 Feet 88 3   Walk 50 Feet with Two Turns 88 3   Walk 150 Feet 88 3   Walk 10 Feet on Uneven Surfaces 88 3   1 Step (Curb) 88 3   4 Steps 88 3   12 Steps 88 3   Picking up Object from Floor 88 3   Wheel 50 Feet with 2 Turns 88 3   Type Manual    Wheel 150 Feet 88 3   Type Manual        The above codes were determined by the treatment team to be the patient's accurate admission assessment codes based on assessment performed soon after the patient's admission and prior to the benefit of services provided by staff, or if appropriate, the patient's usual performance at admission.    OT:       PT:  Franki Tracey DPT [id number] 10/4/2024 1535     RN:       ST:  Mckenzie Maxwell M.A. CCC-SLP #[id number] 10/4/24, 1090       :

## 2024-10-03 NOTE — PROGRESS NOTES
Patient A&Ox4. VSS. Neuro checks unchanged, left side remains flaccid, pt reports decreased sensation and tingling in LUE and LLE. Pt denies pain or nausea, tolerating diet, difficulty swallowing large pills this am, medication changed to liquid. Pt and spouse instructed to call out for needs, fall precautions in place. Will continue to monitor for changes.

## 2024-10-03 NOTE — H&P
Patient: Rita Dixon  [id number]  Date: 10/3/2024      Chief Complaint: Acute right basal ganglia and corona radiata ischemic stroke    History of Present Illness/Hospital Course:  Rita Dixon is a 68 y.o. female with PMHx notable for hypertension, GBM s/p multiple resection (2/29/23, 9/24/24), focal epilepsy who presented to [facility] on 9/28/24 with left-sided weakness.  Following her recent craniotomy for resection of right anterolateral temporal lobe GBM she developed generalized tonic-clonic seizure, with some left-sided weakness.  Weakness improved and patient was able to return home on 9/27, however she returned on 9/28 with worsening left-sided weakness.  MRI brain confirmed acute infarct involving the right basal ganglia and corona radiata.  Neurology was consulted, recommended permissive hypertension noting a pressure dependent exam, so home blood pressure medications were held.  She was continued on her home antiepileptics, as well as a dexamethasone taper per neurosurgery.    Patient reports that she is doing well today.  She had a mild right-sided headache which responded well to Tylenol.  Reports inability to move her left arm or leg.  Reports some diminished visual acuity that is chronic, no acute vision changes.  Having some difficulty swallowing pills this morning.  Denies any chest pain, dyspnea, abdominal pain.    Prior Level of Function:  Independent for mobility, self-care, IADLs.  Lives with spouse in a single level house with 2 steps to enter.    Current Level of Function:  Min assist for feeding, max assist for grooming, mod assist for upper body dressing, dependent for lower body dressing, dependent for bathing, max assist for toileting.  Max assist for bed mobility, dependent for transfers.     Functional Deficits:  Left hemiparesis, left hemisensory changes, impaired cognition, dysphagia limiting independence with mobility and self-care.     has a past medical history of Arthritis, GBM

## 2024-10-03 NOTE — PROGRESS NOTES
[facility] - Inpatient Rehabilitation Department   Phone: [phone]    Physical Therapy    [x] Initial Evaluation            [] Daily Treatment Note         [] Discharge Summary      Patient: Rita Dixon   : 1955   MRN: [id number]   Date of Service:  10/3/2024  Admitting Diagnosis: Acute cerebrovascular accident (CVA) due to ischemia (HCC)  Current Admission Summary: Patient is a 68-year-old female who presented to the ED of an OSH on  for complaint of recurring weakness of her left hemibody. Patient has a PMHx: arthritis, GBM, HTN, Kidney Stone, Osteoporosis, and Seizures and a PSHx: Colonoscopy, Craniotomy, Eyes surgery, T&A and [place] Tooth extraction. Patient was recently discharged from hospital  to GBM tumor progression and underwent right frontotemporal craniotomy for resection of tumor on  per Neurosurgery. Patient had an abnormal EEG on . Patient was d/c on . On  patient was experiencing progressive weakness with neurological changes and returned to the hospital. Patient was demonstrating neurological changes, and a consult was placed to Neurology with a pending MRI Brain. On  MRI Brain was significant for Acute infarct with associated FLAIR signal abnormality in the right basal ganglia and corona radiata and acute CVA was confirmed per Neurology.   Past Medical History:  has a past medical history of Arthritis, GBM (glioblastoma multiforme) (HCC), HTN (hypertension), Kidney stones, Osteoporosis, and Seizures (HCC).  Past Surgical History:  has a past surgical history that includes brain surgery (Right, 2023); Eye surgery (Left); and craniotomy (Right, 2024).  Discharge Recommendations: To be determined based on pt's progress  DME Required For Discharge: DME to be determined pending patient progress  Precautions/Restrictions: no restrictions  Weight Bearing Restrictions: no restrictions  [] Right Upper Extremity  [] Left Upper Extremity []  Mobility:  Supine to Sit: Max of 2  Sit to Supine: Max of 2  Rolling Left: moderate assistance  Rolling Right: maximum assistance  Comments: verbal cues for Pt to use R UE and LE for assistance with Rolling  Transfers:  Sit to stand transfer: Max of 1, Mod of 1 from recliner and in steady  Stand to sit transfer: maximum assistance  Stand pivot transfer: Max of 2 from recliner to bed  Comments: Blocking L knee,   Ambulation:  Ambulation not tested on this date secondary to hypotonicity in L UE and LE .  Distance:   Gait Mechanics:   Comments:    Stair Mobility:  Stair mobility not completed on this date.  Comments:  Wheelchair Mobility:  No w/c mobility completed on this date.  Comments:  Balance:  Static Sitting Balance: poor (-): requires max (A) to maintain balance  Static Standing Balance: poor (-): requires max (A) to maintain balance  Comments:    Other Therapeutic Interventions: Pt awake and in recliner upon entry. Pt agreeable to PT treatment session. Pt completed sit to stand from recliner with a 2 person assist with a Max of 1 and Mod of 1. Pt completed stand pivot transfer with a Max of 2 from recliner to bed. Pt needed Max of 1 for sitting balance at EOB. Vc for Pt to tilt head to midline with good tolerance, but Pt's head would drift into L lateral flexion after. Pt completed sit to  steady with vc to use R UE to pull with Max of 1 and Mod of 1. Pt stood in steady with Mod of 2. Pt completed washing herself at sink with Min A to prevent L lateral lean in w/c. Pt completed standing in steady to doff and don pants with Mod A of 2. Pt returned to bed with alarm on and all needs within reach of R side.     Functional Outcomes                 Cognition  WFL  Orientation:    alert and oriented x 4  Command Following:   WFL    Education  Barriers To Learning: none  Patient Education: patient educated on goals, PT role and benefits, plan of care, general safety, energy conservation, family education,      Electronically Signed By: Akin Aragon SPT  Therapist was present, directed the patient's care, made skilled judgement, and was responsible for assessment and treatment of the patient.  Co-signed and supervised by: Franki Tracey DPT [id number]

## 2024-10-03 NOTE — PLAN OF CARE
Problem: Discharge Planning  Goal: Discharge to home or other facility with appropriate resources  10/3/2024 1202 by Layne Magdaleno RN  Outcome: Progressing     Problem: Chronic Conditions and Co-morbidities  Goal: Patient's chronic conditions and co-morbidity symptoms are monitored and maintained or improved  10/3/2024 1202 by Layne Magdaleno RN  Outcome: Progressing     Problem: Safety - Adult  Goal: Free from fall injury  10/3/2024 1202 by Layne Magdaleno RN  Outcome: Progressing

## 2024-10-03 NOTE — PROGRESS NOTES
[facility] - Inpatient Rehabilitation Department   Phone: [phone]    Occupational Therapy    [x] Initial Evaluation            [] Daily Treatment Note         [] Discharge Summary      Patient: Rita Dixon   : 1955   MRN: [id number]   Date of Service:  10/3/2024    Admitting Diagnosis:  Acute cerebrovascular accident (CVA) due to ischemia (HCC)  Current Admission Summary:   Rita Dixon is a 68 y.o. female with PMHx notable for hypertension, GBM s/p multiple resection (, 24), focal epilepsy who presented to [facility] on 24 with left-sided weakness.  Following her recent craniotomy for resection of right anterolateral temporal lobe GBM she developed generalized tonic-clonic seizure, with some left-sided weakness.  Weakness improved and patient was able to return home on , however she returned on  with worsening left-sided weakness.  MRI brain confirmed acute infarct involving the right basal ganglia and corona radiata.  Neurology was consulted, recommended permissive hypertension noting a pressure dependent exam, so home blood pressure medications were held.  She was continued on her home antiepileptics, as well as a dexamethasone taper per neurosurgery.     Patient reports that she is doing well today.  She had a mild right-sided headache which responded well to Tylenol.  Reports inability to move her left arm or leg.  Reports some diminished visual acuity that is chronic, no acute vision changes.  Having some difficulty swallowing pills this morning.  Denies any chest pain, dyspnea, abdominal pain  Past Medical History:  has a past medical history of Arthritis, GBM (glioblastoma multiforme) (HCC), HTN (hypertension), Kidney stones, Osteoporosis, and Seizures (HCC).  Past Surgical History:  has a past surgical history that includes brain surgery (Right, 2023); Eye surgery (Left); and craniotomy (Right, 2024).    Discharge Recommendations: Pending  consistently  Attention Span: difficulty dividing attention  Memory: appears intact  Safety Judgement: decreased awareness of need for assistance, decreased awareness of need for safety  Problem Solving: assistance required to generate solutions, decreased awareness of errors  Insights: decreased awareness of deficits  Initiation: requires cues for some  Sequencing: requires cues for some  Comments: continue to assess   Orientation:    alert and oriented x 4  Command Following:   accurately follows one step commands     Education  Barriers To Learning: cognition and visual  Patient Education: patient educated on goals, OT role and benefits, plan of care, ADL adaptive strategies, proper use of assistive device/equipment, family education, disease specific education, transfer training, discharge recommendations  Learning Assessment:  patient will require reinforcement due to cognitive deficits    Assessment  Activity Tolerance: fatigued session- limited by L sided deficits   No dizziness or SOB  Impairments Requiring Therapeutic Intervention: decreased functional mobility, decreased ADL status, decreased ROM, decreased strength, decreased cognition, decreased endurance, decreased balance, decreased fine motor control, decreased coordination, decreased posture  Prognosis: good  Clinical Assessment: Patient presenting below baseline function secondary to CVA following craniotomy for glioblastoma. Patient typically independent with ADLs and mobility with no device. Patient requiring max-total A for ADLs and extensive assist of 2 for functional transfer. Patient limited by impaired sitting balance, LUE flaccidity, and L inattention. Patient will benefit from continued OT services to address above deficits and maximize safety and independence.   Safety Interventions: patient left in bed, bed alarm in place, call light within reach, patient at risk for falls, nurse notified, and family/caregiver present    Plan  Frequency:

## 2024-10-04 PROCEDURE — 97535 SELF CARE MNGMENT TRAINING: CPT

## 2024-10-04 PROCEDURE — 97112 NEUROMUSCULAR REEDUCATION: CPT

## 2024-10-04 PROCEDURE — 97130 THER IVNTJ EA ADDL 15 MIN: CPT

## 2024-10-04 PROCEDURE — 6360000002 HC RX W HCPCS: Performed by: STUDENT IN AN ORGANIZED HEALTH CARE EDUCATION/TRAINING PROGRAM

## 2024-10-04 PROCEDURE — 97116 GAIT TRAINING THERAPY: CPT

## 2024-10-04 PROCEDURE — 97530 THERAPEUTIC ACTIVITIES: CPT

## 2024-10-04 PROCEDURE — 97129 THER IVNTJ 1ST 15 MIN: CPT

## 2024-10-04 PROCEDURE — 6370000000 HC RX 637 (ALT 250 FOR IP): Performed by: STUDENT IN AN ORGANIZED HEALTH CARE EDUCATION/TRAINING PROGRAM

## 2024-10-04 PROCEDURE — 1280000000 HC REHAB R&B

## 2024-10-04 PROCEDURE — 92526 ORAL FUNCTION THERAPY: CPT

## 2024-10-04 RX ADMIN — HEPARIN SODIUM 5000 UNITS: 5000 INJECTION INTRAVENOUS; SUBCUTANEOUS at 20:27

## 2024-10-04 RX ADMIN — DEXAMETHASONE 4 MG: 4 TABLET ORAL at 20:27

## 2024-10-04 RX ADMIN — OXYCODONE 5 MG: 5 TABLET ORAL at 20:27

## 2024-10-04 RX ADMIN — HEPARIN SODIUM 5000 UNITS: 5000 INJECTION INTRAVENOUS; SUBCUTANEOUS at 15:19

## 2024-10-04 RX ADMIN — ACETAMINOPHEN 650 MG: 325 TABLET ORAL at 15:18

## 2024-10-04 RX ADMIN — LEVETIRACETAM 500 MG: 100 SOLUTION ORAL at 10:01

## 2024-10-04 RX ADMIN — DEXAMETHASONE 4 MG: 4 TABLET ORAL at 10:00

## 2024-10-04 RX ADMIN — STANDARDIZED SENNA CONCENTRATE AND DOCUSATE SODIUM 2 TABLET: 8.6; 5 TABLET ORAL at 10:01

## 2024-10-04 RX ADMIN — LACOSAMIDE 200 MG: 100 TABLET, FILM COATED ORAL at 20:27

## 2024-10-04 RX ADMIN — HEPARIN SODIUM 5000 UNITS: 5000 INJECTION INTRAVENOUS; SUBCUTANEOUS at 05:52

## 2024-10-04 RX ADMIN — ATORVASTATIN CALCIUM 40 MG: 40 TABLET, FILM COATED ORAL at 20:27

## 2024-10-04 RX ADMIN — LEVETIRACETAM 500 MG: 100 SOLUTION ORAL at 20:28

## 2024-10-04 RX ADMIN — ASPIRIN 81 MG: 81 TABLET, COATED ORAL at 10:00

## 2024-10-04 RX ADMIN — LACOSAMIDE 200 MG: 100 TABLET, FILM COATED ORAL at 10:00

## 2024-10-04 ASSESSMENT — PAIN DESCRIPTION - DESCRIPTORS
DESCRIPTORS: ACHING
DESCRIPTORS: ACHING

## 2024-10-04 ASSESSMENT — PAIN SCALES - GENERAL
PAINLEVEL_OUTOF10: 5
PAINLEVEL_OUTOF10: 0
PAINLEVEL_OUTOF10: 4
PAINLEVEL_OUTOF10: 0
PAINLEVEL_OUTOF10: 3
PAINLEVEL_OUTOF10: 5

## 2024-10-04 ASSESSMENT — PAIN DESCRIPTION - LOCATION
LOCATION: EYE
LOCATION: HEAD;EYE

## 2024-10-04 ASSESSMENT — PAIN DESCRIPTION - ORIENTATION
ORIENTATION: RIGHT
ORIENTATION: RIGHT

## 2024-10-04 ASSESSMENT — PAIN - FUNCTIONAL ASSESSMENT: PAIN_FUNCTIONAL_ASSESSMENT: ACTIVITIES ARE NOT PREVENTED

## 2024-10-04 NOTE — PLAN OF CARE
Problem: Discharge Planning  Goal: Discharge to home or other facility with appropriate resources  Recent Flowsheet Documentation  Taken 10/3/2024 2130 by Rita Ramírez RN  Discharge to home or other facility with appropriate resources: Identify discharge learning needs (meds, wound care, etc)  10/3/2024 1202 by Layne Magdaleno RN  Outcome: Progressing     Problem: Chronic Conditions and Co-morbidities  Goal: Patient's chronic conditions and co-morbidity symptoms are monitored and maintained or improved  10/4/2024 0112 by Rita Ramírez RN  Outcome: Progressing  Flowsheets (Taken 10/3/2024 2130)  Care Plan - Patient's Chronic Conditions and Co-Morbidity Symptoms are Monitored and Maintained or Improved:   Monitor and assess patient's chronic conditions and comorbid symptoms for stability, deterioration, or improvement   Collaborate with multidisciplinary team to address chronic and comorbid conditions and prevent exacerbation or deterioration   Update acute care plan with appropriate goals if chronic or comorbid symptoms are exacerbated and prevent overall improvement and discharge  10/3/2024 1202 by Layne Magdaleno RN  Outcome: Progressing     Problem: Safety - Adult  Goal: Free from fall injury  10/4/2024 0112 by Rita Ramírez RN  Outcome: Progressing  10/3/2024 1202 by Layne Magdaleno RN  Outcome: Progressing     Problem: Pain  Goal: Verbalizes/displays adequate comfort level or baseline comfort level  Outcome: Progressing  Flowsheets (Taken 10/3/2024 2130)  Verbalizes/displays adequate comfort level or baseline comfort level: Encourage patient to monitor pain and request assistance     Problem: Skin/Tissue Integrity  Goal: Absence of new skin breakdown  Description: 1.  Monitor for areas of redness and/or skin breakdown  2.  Assess vascular access sites hourly  3.  Every 4-6 hours minimum:  Change oxygen saturation probe site  4.  Every 4-6 hours:  If on nasal continuous positive airway pressure, respiratory

## 2024-10-04 NOTE — PROGRESS NOTES
Nutrition Note    RECOMMENDATIONS  PO Diet: Regular  ONS: Ensure clear TID     NUTRITION ASSESSMENT   Nutrition intervention triggered for new ARU admission s/p CVA.  Pt receives a regular diet & reports appetite is fair.  States she has had chemo & radiation tx which have caused a decrease in appetite, but is getting better. Pt states she is eating more than 50% of meals.  Pt has lost 19 lb in past year, which is when she was first dx with glioblastoma.  Pt is trying to eat good sources of protein to promote healing & strength.  Does not care for Ensure shakes, but likes Ensure clear & agrees to receive TID.  Will monitor for continued improvement in po & ONS intake.     Nutrition Related Findings: LBM 9/23 per EMR; nonpitting edema BLE; gluc 122  Wounds: Surgical Incision  Nutrition Education:  Education not indicated   Nutrition Goals: PO intake 75% or greater     MALNUTRITION ASSESSMENT   Chronic Illness  Malnutrition Status: At risk for malnutrition (Comment)    NUTRITION DIAGNOSIS   Increased nutrient needs related to increase demand for energy/nutrients as evidenced by other (comment) (increased protein needs to promote surgical healing & strength for participating in therapy)      CURRENT NUTRITION THERAPIES  ADULT DIET; Regular     PO Intake: 51-75%, % (per pt)   PO Supplement Intake:None Ordered    ANTHROPOMETRICS  Current Height: 152.4 cm (5')  Current Weight - Scale: 52.6 kg (116 lb)    Ideal Body Weight (IBW): 100 lbs  (45 kg)    Usual Bodyweight 61.2 kg (135 lb)       BMI: 22.7      COMPARATIVE STANDARDS  Total Energy Requirements (kcals/day): 1362     Protein (g):  63- 105g       Fluid (mL/day):  1 ml/kcal    The patient will be monitored per nutrition standards of care. Consult dietitian if additional nutrition interventions are needed prior to RD reassessment.     Gail Marquez, ALETHEA, LD    Contact: [phone]

## 2024-10-04 NOTE — PROGRESS NOTES
[facility] - Inpatient Rehabilitation Department   Phone: [phone]    Occupational Therapy    [] Initial Evaluation            [x] Daily Treatment Note         [] Discharge Summary      Patient: Rita Dixon   : 1955   MRN: [id number]   Date of Service:  10/4/2024    Admitting Diagnosis:  Acute cerebrovascular accident (CVA) due to ischemia (HCC)  Current Admission Summary:   Rita Dixon is a 68 y.o. female with PMHx notable for hypertension, GBM s/p multiple resection (, 24), focal epilepsy who presented to [facility] on 24 with left-sided weakness.  Following her recent craniotomy for resection of right anterolateral temporal lobe GBM she developed generalized tonic-clonic seizure, with some left-sided weakness.  Weakness improved and patient was able to return home on , however she returned on  with worsening left-sided weakness.  MRI brain confirmed acute infarct involving the right basal ganglia and corona radiata.  Neurology was consulted, recommended permissive hypertension noting a pressure dependent exam, so home blood pressure medications were held.  She was continued on her home antiepileptics, as well as a dexamethasone taper per neurosurgery.     Patient reports that she is doing well today.  She had a mild right-sided headache which responded well to Tylenol.  Reports inability to move her left arm or leg.  Reports some diminished visual acuity that is chronic, no acute vision changes.  Having some difficulty swallowing pills this morning.  Denies any chest pain, dyspnea, abdominal pain  Past Medical History:  has a past medical history of Arthritis, GBM (glioblastoma multiforme) (HCC), HTN (hypertension), Kidney stones, Osteoporosis, and Seizures (HCC).  Past Surgical History:  has a past surgical history that includes brain surgery (Right, 2023); Eye surgery (Left); and craniotomy (Right, 2024).    Discharge Recommendations: Pending  progress    DME Required For Discharge: DME to be determined pending patient progress    Precautions/Restrictions: high fall risk  Weight Bearing Restrictions: no restrictions     Required Braces/Orthotics: no braces required  Positional Restrictions:no positional restrictions  Flaccid LUE    Pre-Admission Information   Lives With: spouse                  Type of Home: house  Home Layout: one level, Access to crafts down a step  Home Access:  2 step to enter with handrail.  Handrails are located on R side.  Bathroom Layout: wheelchair accessible, walk in shower  Bathroom Equipment: shower chair  Toilet Height: standard height  Home Equipment: no prior equipment  Transfer Assistance: Independent without use of device  Ambulation Assistance:Independent without use of device  ADL Assistance: independent with all ADL's  IADL Assistance: requires assistance with shopping  Active :        [] Yes                 [x] No  Hand Dominance: [] Left                 [x] Right  Current Employment: retired.  Occupation: office work  Hobbies: crafts, sewing  Recent Falls: N/A     Available Assistance at Discharge: 24 hr physical assistance available    Examination 10/3  Vision:   Vision Gross Assessment: Impaired and Vision Corrective Device: wears glasses at all times-declining wearing glasses this date due to side of head is itchy  Reporting L eye is impaired at baseline  Will benefit from further assessment   Hearing:   WFL  Perception:   Unilateral Attention: cues to attend (L) side of body, cues to maintain midline in sitting  Initiation: cues to initiate tasks  Observation:   General Observation:  minimal dried blood around staples on R side of head from craniotomy, flaccid LUE/LLE    Posture:   L lateral lean, posterior pelvic tilt  Neck laterally flexed to L- able to correct to midline with cues but quickly reverts back to L   Sensation:   accurately detects gross touch to all extremities  Proprioception:    diminished  proprioception in (L) UE, (L) LE  Tone:   Hypotonic in (L) UE, (L) LE  Coordination Testing:   Unable to formally test secondary to flaccid on LUE, RUE not tested due to time      ROM:   RUE WFL, PROM of LUE WFL- no AROM of LUE   Strength:   RUE WFL, LUE 0/5         Subjective  General: Patient in recliner upon arrival, agreeable to OT/PT session.    Pain: 0/10   Pain Interventions: not applicable        Activities of Daily Living  Basic Activities of Daily Living  Feeding: setup assistance  Feeding Comments: setup with breakfast at end of session  Lower Extremity Dressing: dependent  Dressing Comments: assist of 2 for LB dressing at toilet. Total A doffing/donning pullup and pants- maxA for standing balance and total A for clothing mgmt up/down.   Toileting: dependent.    Toileting Comments: voided urine seated on [facility] overtop toilet. Total A for pericare and clothing mgmt + maxA of 1 for standing balance   General Comments: maxA for hand hygiene seated in wc at sink. Patient declining other ADLs.   Instrumental Activities of Daily Living  No IADL completed on this date.    Functional Mobility  Bed Mobility:  Bed mobility not completed on this date.  Comments:   Transfers:  Sit to stand transfer:2 person assistance with max + modA    Stand to sit transfer: 2 person assistance with maxA of 2   Stand pivot transfer: 2 person assistance with max + modA    Toilet transfer: 2 person assistance with max + modA    Toilet transfer equipment: grab bars  Toilet transfer comments: pivot wc<>bariatric BSC overtop toilet   Comments: in recliner upon arrival. Max + modA pivot from recliner to wc  Pivot wc<>toilet max + modA   Transfer from wc to parallel bars and welch rail with max + modA.   Functional Mobility  Functional Mobility Activity: 9 ft   Device Use: welch rail  Required Assistance: 2 person assistance with max + mod-maxA. And wc follow     Comment: gimohr sling on LUE, blocking LLE and advancing LLE. Cues for sequencing

## 2024-10-04 NOTE — PROGRESS NOTES
[facility] - Inpatient Rehabilitation Department   Phone: [phone]    Speech Therapy    [] Initial Evaluation            [x] Daily Treatment Note         [] Discharge Summary      Patient: Rita Dixon   : 1955   MRN: [id number]   Date of Service:  10/4/2024  Admitting Diagnosis: Acute cerebrovascular accident (CVA) due to ischemia (HCC)  Current Admission Summary: See rehab MD note  Past Medical History:  has a past medical history of Arthritis, GBM (glioblastoma multiforme) (HCC), HTN (hypertension), Kidney stones, Osteoporosis, and Seizures (HCC).  Past Surgical History:  has a past surgical history that includes brain surgery (Right, 2023); Eye surgery (Left); and craniotomy (Right, 2024).  Recent MRI Brain: 24  1. Acute infarct with associated FLAIR signal abnormality in the right basal ganglia and corona radiata. No significant midline shift or evidence of hemorrhagic conversion.   2. Mild diffusion restriction in the right hippocampus may represent sequela of seizures or ischemia.   3. Postoperative changes of right anterior temporal mass resection. Punctate foci of enhancement at the resection site are favored postsurgical, with attention on follow-up recommended.   4. Unchanged trace intraventricular hemorrhage with unchanged ventricular size and configuration.   Instrumental Swallow Study: 24  Patient presents with grossly normal oropharyngeal swallow function with observed flash laryngeal penetration of thin liquids and puree intermittently which fully clears during the swallow. There is no significant post-swallow residue in the pharynx. Mastication is timely and effective, with adequate oral clearance. Swallow safety and swallow efficiency are grossly WFL.   Precautions/Restrictions: high fall risk        Pre-Admission Information   Living Status: Pt lives at home with  who manages medications and finances. Pt previously assisted with simple

## 2024-10-04 NOTE — PLAN OF CARE
Problem: Discharge Planning  Goal: Discharge to home or other facility with appropriate resources  Outcome: Progressing  Flowsheets (Taken 10/4/2024 1147 by Ashely Bowen)  Discharge to home or other facility with appropriate resources: Identify discharge learning needs (meds, wound care, etc)     Problem: Chronic Conditions and Co-morbidities  Goal: Patient's chronic conditions and co-morbidity symptoms are monitored and maintained or improved  10/4/2024 1214 by Reina Nunn RN  Outcome: Progressing  Flowsheets (Taken 10/4/2024 1147 by Ashely Bowen)  Care Plan - Patient's Chronic Conditions and Co-Morbidity Symptoms are Monitored and Maintained or Improved:   Monitor and assess patient's chronic conditions and comorbid symptoms for stability, deterioration, or improvement   Collaborate with multidisciplinary team to address chronic and comorbid conditions and prevent exacerbation or deterioration   Update acute care plan with appropriate goals if chronic or comorbid symptoms are exacerbated and prevent overall improvement and discharge  10/4/2024 0112 by Rita Ramírez RN  Outcome: Progressing  Flowsheets (Taken 10/3/2024 2130)  Care Plan - Patient's Chronic Conditions and Co-Morbidity Symptoms are Monitored and Maintained or Improved:   Monitor and assess patient's chronic conditions and comorbid symptoms for stability, deterioration, or improvement   Collaborate with multidisciplinary team to address chronic and comorbid conditions and prevent exacerbation or deterioration   Update acute care plan with appropriate goals if chronic or comorbid symptoms are exacerbated and prevent overall improvement and discharge     Problem: Safety - Adult  Goal: Free from fall injury  10/4/2024 1214 by Reina Nunn RN  Outcome: Progressing  10/4/2024 0112 by Rita Ramírez RN  Outcome: Progressing     Problem: Pain  Goal: Verbalizes/displays adequate comfort level or baseline comfort

## 2024-10-04 NOTE — PROGRESS NOTES
Rita Dixon  10/4/2024  [id number]    Chief Complaint: Acute cerebrovascular accident (CVA) due to ischemia (HCC)    Subjective    No acute events overnight    Patient reports that she is doing well.  Slept well.  Appetite is adequate, tolerating diet. Denies any dysphagia, or difficulty with pill swallowing, but does prefer to continue with Keppra oral liquid. Denies fevers/chills, headache, chest pain, dyspnea, abdominal pain.     Last BM:          Objective    Patient Vitals for the past 24 hrs:   BP Temp Temp src Pulse Resp SpO2 Height   10/04/24 1147 -- -- -- 77 -- -- --   10/04/24 1131 -- -- -- -- -- -- 1.524 m (5')   10/04/24 0945 (!) 142/85 98.1 °F (36.7 °C) Oral 77 18 100 % --   10/03/24 2206 -- -- -- -- 16 -- --   10/03/24 2130 (!) 153/78 98.3 °F (36.8 °C) Oral 62 18 95 % --     Gen: No distress, pleasant.   HEENT: Normocephalic, atraumatic.   CV: Regular rate and rhythm. Extremities warm, well perfused.   Resp: No respiratory distress. CTAB.  Abd: Soft, nontender.  Ext: No edema.   Neuro: Alert, oriented, appropriately interactive. Flaccid left hemiparesis, and left inattention.     Laboratory data: Available via EMR.     Therapy progress:       PT    Supine to Sit: Dependent  Sit to Supine: Dependent   Sit to Stand: Dependent  Chair/Bed to Chair Transfer: Dependent  Car Transfer:    Ambulation 10 ft:    Ambulation 50 ft:    Ambulation 150 ft:    Stairs - 1 Step:    Stairs - 4 Step:    Stairs - 12 Step:      OT    Eating: Setup or clean-up assistance  Oral Hygiene:    Bathing:    Upper Body Dressing: Dependent  Lower Body Dressing: Dependent  Toilet Transfer: Dependent  Toilet Hygiene: Dependent    Speech Therapy    Pt presents with moderate cognitive-linguistic deficits within the domains of immediate and short-term memory recall, attention, problem solving and executive function. Pt is tangential in conversation with impaired thought organization however demonstrates functional expression of  Heparin 5000 units subcutaneous every 8 hours  ELOS: 23 days  Follow-ups: NSGY, Neurology, Oncology, Rad-Onc, PCP          Bassem Mueller MD 10/4/2024, 5:20 PM    * This document was created using dictation software.  While all precautions were taken to ensure accuracy, errors may have occurred.  Please disregard any typographical errors.

## 2024-10-04 NOTE — PROGRESS NOTES
[facility] - Inpatient Rehabilitation Department   Phone: [phone]    Physical Therapy    [] Initial Evaluation            [x] Daily Treatment Note         [] Discharge Summary      Patient: Rita Dixon   : 1955   MRN: [id number]   Date of Service:  10/4/2024  Admitting Diagnosis: Acute cerebrovascular accident (CVA) due to ischemia (HCC)  Current Admission Summary: Patient is a 68-year-old female who presented to the ED of an OSH on  for complaint of recurring weakness of her left hemibody. Patient has a PMHx: arthritis, GBM, HTN, Kidney Stone, Osteoporosis, and Seizures and a PSHx: Colonoscopy, Craniotomy, Eyes surgery, T&A and [place] Tooth extraction. Patient was recently discharged from hospital  to GBM tumor progression and underwent right frontotemporal craniotomy for resection of tumor on  per Neurosurgery. Patient had an abnormal EEG on . Patient was d/c on . On  patient was experiencing progressive weakness with neurological changes and returned to the hospital. Patient was demonstrating neurological changes, and a consult was placed to Neurology with a pending MRI Brain. On  MRI Brain was significant for Acute infarct with associated FLAIR signal abnormality in the right basal ganglia and corona radiata and acute CVA was confirmed per Neurology.   Past Medical History:  has a past medical history of Arthritis, GBM (glioblastoma multiforme) (HCC), HTN (hypertension), Kidney stones, Osteoporosis, and Seizures (HCC).  Past Surgical History:  has a past surgical history that includes brain surgery (Right, 2023); Eye surgery (Left); and craniotomy (Right, 2024).  Discharge Recommendations: To be determined based on pt's progress  DME Required For Discharge: DME to be determined pending patient progress  Precautions/Restrictions: no restrictions  Weight Bearing Restrictions: no restrictions  [] Right Upper Extremity  [] Left Upper Extremity []  mobility not completed on this date.  Comments:  Wheelchair Mobility:  No w/c mobility completed on this date.  Comments:  Balance:  Static Sitting Balance: poor (-): requires max (A) to maintain balance  Static Standing Balance: poor (-): requires max (A) to maintain balance  Comments:    Other Therapeutic Interventions: Pt awake and in recliner upon entry. Pt agreeable to PT treatment session. Pt requested to use bathroom. Pt completed 2 person transfer stand pivot from recliner to w/c with Max of 2. Pt completed 2 person stand pivot from w/c to elevated toilet chair with Max of 1 and Mod of 1. Pt completed sit to stand to don pants with Max of 1. Pt completed 2 person stand pivot from elevated toilet to w/c with Max of 1 and Mod of 1. Pt completed standing in parallel bars with Max of 1 for standing balance with assist at pelvis and L knee block. OT assisted with LUE positioning.  Observed posterior and L lean with standing in parallel bars. Pt completed ambulation 9ft with gait mechanics listed above. Pt returned to room staying in w/c with alarm on and all needs within reach to the R side.     2nd session: Pt awake and in w/c upon entry. Pt completed ambulation 8ft with R sided handrail and use of givmohr sling for L UE. Pt completed with Max A of 2. Gait pattern same as first session. Pt completed stand pivot from w/c to mat table with 2 person assist at Max A of 1 and Mod A of 1. Pt completed sitting up with visual cues using a mirror with Max to Mod A of 1 to sit in midline. Pt completed shoulder squeezes  and push pull on jodie with OT resistance with trace to none L bicep activation with Max to Mod A of 1. Pt completed R and L lateral elbow props with Max A to Mod A of 1 to sit straight up. Minimal trunk control noted sitting EOM with posterior and L lean without self correction.  Pt completed sit to  stead with Max A of 2. Used steady with Mad A of 1 for balance to roll patient to EOB. Pt  transfers at moderate assistance   Patient will ambulate 15 ft with use of LRAD at maximum assistance  Patient will complete manual w/c propulsion 100 ft at modified independent  Patient will demonstrate static sitting balance at minimal assistance     Above goals reviewed on 10/4/2024.  All goals are ongoing at this time unless indicated above.      Therapy Session Time      Individual Group Co-treatment   Time In      0830   Time Out     0920   Minutes      50      Individual Group Co-treatment   Time In      1330   Time Out     1415   Minutes      45       Timed Code Treatment Minutes:   50+45  Total Treatment Minutes: 95       Electronically Signed By: Akin Aragon SPT  Therapist was present, directed the patient's care, made skilled judgement, and was responsible for assessment and treatment of the patient.  Co-signed and supervised by: Franki Tracey, DPT [id number]

## 2024-10-05 PROCEDURE — 6370000000 HC RX 637 (ALT 250 FOR IP): Performed by: STUDENT IN AN ORGANIZED HEALTH CARE EDUCATION/TRAINING PROGRAM

## 2024-10-05 PROCEDURE — 97129 THER IVNTJ 1ST 15 MIN: CPT

## 2024-10-05 PROCEDURE — 97130 THER IVNTJ EA ADDL 15 MIN: CPT

## 2024-10-05 PROCEDURE — 97530 THERAPEUTIC ACTIVITIES: CPT

## 2024-10-05 PROCEDURE — 6360000002 HC RX W HCPCS: Performed by: STUDENT IN AN ORGANIZED HEALTH CARE EDUCATION/TRAINING PROGRAM

## 2024-10-05 PROCEDURE — 1280000000 HC REHAB R&B

## 2024-10-05 PROCEDURE — 97112 NEUROMUSCULAR REEDUCATION: CPT

## 2024-10-05 PROCEDURE — 97535 SELF CARE MNGMENT TRAINING: CPT

## 2024-10-05 RX ADMIN — ASPIRIN 81 MG: 81 TABLET, COATED ORAL at 09:03

## 2024-10-05 RX ADMIN — ACETAMINOPHEN 650 MG: 325 TABLET ORAL at 13:30

## 2024-10-05 RX ADMIN — DEXAMETHASONE 4 MG: 4 TABLET ORAL at 09:03

## 2024-10-05 RX ADMIN — HEPARIN SODIUM 5000 UNITS: 5000 INJECTION INTRAVENOUS; SUBCUTANEOUS at 13:30

## 2024-10-05 RX ADMIN — LEVETIRACETAM 500 MG: 100 SOLUTION ORAL at 22:03

## 2024-10-05 RX ADMIN — STANDARDIZED SENNA CONCENTRATE AND DOCUSATE SODIUM 2 TABLET: 8.6; 5 TABLET ORAL at 09:03

## 2024-10-05 RX ADMIN — LEVETIRACETAM 500 MG: 100 SOLUTION ORAL at 09:02

## 2024-10-05 RX ADMIN — LACOSAMIDE 200 MG: 100 TABLET, FILM COATED ORAL at 22:03

## 2024-10-05 RX ADMIN — LACOSAMIDE 200 MG: 100 TABLET, FILM COATED ORAL at 09:03

## 2024-10-05 RX ADMIN — HEPARIN SODIUM 5000 UNITS: 5000 INJECTION INTRAVENOUS; SUBCUTANEOUS at 06:06

## 2024-10-05 RX ADMIN — HEPARIN SODIUM 5000 UNITS: 5000 INJECTION INTRAVENOUS; SUBCUTANEOUS at 22:03

## 2024-10-05 RX ADMIN — ATORVASTATIN CALCIUM 40 MG: 40 TABLET, FILM COATED ORAL at 22:03

## 2024-10-05 RX ADMIN — DEXAMETHASONE 4 MG: 4 TABLET ORAL at 22:03

## 2024-10-05 ASSESSMENT — PAIN SCALES - GENERAL
PAINLEVEL_OUTOF10: 5
PAINLEVEL_OUTOF10: 5

## 2024-10-05 ASSESSMENT — PAIN DESCRIPTION - DESCRIPTORS
DESCRIPTORS: ACHING;DULL
DESCRIPTORS: ACHING

## 2024-10-05 ASSESSMENT — PAIN DESCRIPTION - ORIENTATION
ORIENTATION: RIGHT
ORIENTATION: RIGHT

## 2024-10-05 ASSESSMENT — PAIN DESCRIPTION - LOCATION
LOCATION: HEAD
LOCATION: HEAD

## 2024-10-05 NOTE — PROGRESS NOTES
Assessments completed. Alert, oriented x4. Calm and cooperative with care and medications. Repositioned frequently for pressure reduction. Pressure reducing overlay applied to bed. Urinating without difficulty. No c/o of pain or discomfort. Slept well most of the night. The care plan and education has been reviewed and mutually agreed upon with the patient.  In bed, alarm on, bed in lowest position, call light and table within reach. No further needs expressed at this time.

## 2024-10-05 NOTE — PROGRESS NOTES
[facility] - Inpatient Rehabilitation Department   Phone: [phone]    Occupational Therapy    [] Initial Evaluation            [x] Daily Treatment Note         [] Discharge Summary      Patient: Rita Dixon   : 1955   MRN: [id number]   Date of Service:  10/5/2024    Admitting Diagnosis:  Acute cerebrovascular accident (CVA) due to ischemia (HCC)  Current Admission Summary:   Rita Dixon is a 68 y.o. female with PMHx notable for hypertension, GBM s/p multiple resection (, 24), focal epilepsy who presented to [facility] on 24 with left-sided weakness.  Following her recent craniotomy for resection of right anterolateral temporal lobe GBM she developed generalized tonic-clonic seizure, with some left-sided weakness.  Weakness improved and patient was able to return home on , however she returned on  with worsening left-sided weakness.  MRI brain confirmed acute infarct involving the right basal ganglia and corona radiata.  Neurology was consulted, recommended permissive hypertension noting a pressure dependent exam, so home blood pressure medications were held.  She was continued on her home antiepileptics, as well as a dexamethasone taper per neurosurgery.     Patient reports that she is doing well today.  She had a mild right-sided headache which responded well to Tylenol.  Reports inability to move her left arm or leg.  Reports some diminished visual acuity that is chronic, no acute vision changes.  Having some difficulty swallowing pills this morning.  Denies any chest pain, dyspnea, abdominal pain  Past Medical History:  has a past medical history of Arthritis, GBM (glioblastoma multiforme) (HCC), HTN (hypertension), Kidney stones, Osteoporosis, and Seizures (HCC).  Past Surgical History:  has a past surgical history that includes brain surgery (Right, 2023); Eye surgery (Left); and craniotomy (Right, 2024).    Discharge Recommendations: Pending

## 2024-10-05 NOTE — PLAN OF CARE
Problem: Chronic Conditions and Co-morbidities  Goal: Patient's chronic conditions and co-morbidity symptoms are monitored and maintained or improved  10/5/2024 0056 by Génesis Tan RN  Outcome: Progressing  Flowsheets (Taken 10/4/2024 2030)  Care Plan - Patient's Chronic Conditions and Co-Morbidity Symptoms are Monitored and Maintained or Improved:   Monitor and assess patient's chronic conditions and comorbid symptoms for stability, deterioration, or improvement   Collaborate with multidisciplinary team to address chronic and comorbid conditions and prevent exacerbation or deterioration   Update acute care plan with appropriate goals if chronic or comorbid symptoms are exacerbated and prevent overall improvement and discharge     Problem: Safety - Adult  Goal: Free from fall injury  10/5/2024 0056 by Génesis Tan RN  Outcome: Progressing     Problem: Pain  Goal: Verbalizes/displays adequate comfort level or baseline comfort level  10/5/2024 0056 by Génesis Tan RN  Outcome: Progressing     Problem: Skin/Tissue Integrity  Goal: Absence of new skin breakdown  Description: 1.  Monitor for areas of redness and/or skin breakdown  2.  Assess vascular access sites hourly  3.  Every 4-6 hours minimum:  Change oxygen saturation probe site  4.  Every 4-6 hours:  If on nasal continuous positive airway pressure, respiratory therapy assess nares and determine need for appliance change or resting period.  10/5/2024 0056 by Génesis Tan, RN  Outcome: Progressing     Problem: ABCDS Injury Assessment  Goal: Absence of physical injury  10/5/2024 0056 by Génesis Tan, RN  Outcome: Progressing     Problem: Nutrition Deficit:  Goal: Optimize nutritional status  10/5/2024 0056 by Génesis Tan, RN  Outcome: Progressing

## 2024-10-05 NOTE — PLAN OF CARE
Problem: Safety - Adult  Goal: Free from fall injury  10/5/2024 1251 by Meenakshi Gaona RN  Outcome: Progressing  Note: Pt remains free from falls.  Safety precautions in place.  Bed in lowest position, bed/chair wheels locked, call light with in reach, bedside table in reach, bed/chair alarm on, fall risk wrist band on.

## 2024-10-05 NOTE — PROGRESS NOTES
[facility] - Inpatient Rehabilitation Department   Phone: [phone]    Speech Therapy    [] Initial Evaluation            [x] Daily Treatment Note         [] Discharge Summary      Patient: Rita Dixon   : 1955   MRN: [id number]   Date of Service:  10/5/2024  Admitting Diagnosis: Acute cerebrovascular accident (CVA) due to ischemia (HCC)  Current Admission Summary: See rehab MD note  Past Medical History:  has a past medical history of Arthritis, GBM (glioblastoma multiforme) (HCC), HTN (hypertension), Kidney stones, Osteoporosis, and Seizures (HCC).  Past Surgical History:  has a past surgical history that includes brain surgery (Right, 2023); Eye surgery (Left); and craniotomy (Right, 2024).  Recent MRI Brain: 24  1. Acute infarct with associated FLAIR signal abnormality in the right basal ganglia and corona radiata. No significant midline shift or evidence of hemorrhagic conversion.   2. Mild diffusion restriction in the right hippocampus may represent sequela of seizures or ischemia.   3. Postoperative changes of right anterior temporal mass resection. Punctate foci of enhancement at the resection site are favored postsurgical, with attention on follow-up recommended.   4. Unchanged trace intraventricular hemorrhage with unchanged ventricular size and configuration.   Instrumental Swallow Study: 24  Patient presents with grossly normal oropharyngeal swallow function with observed flash laryngeal penetration of thin liquids and puree intermittently which fully clears during the swallow. There is no significant post-swallow residue in the pharynx. Mastication is timely and effective, with adequate oral clearance. Swallow safety and swallow efficiency are grossly WFL.   Precautions/Restrictions: high fall risk        Pre-Admission Information   Living Status: Pt lives at home with  who manages medications and finances. Pt previously assisted with simple  Assessment: Pt verbalized understanding   Pt requires ongoing learning     Assessment  Impairments Requiring Therapeutic Intervention: Cognitive-Linguistic Deficits  and Oropharyngeal Dysphagia   Prognosis: fair    Clinical Assessment: Pt presents with moderate cognitive-linguistic deficits within the domains of immediate and short-term memory recall, attention, problem solving and executive function. Pt is tangential in conversation with impaired thought organization however demonstrates functional expression of wants/needs and ability to follow commands/answer simple/complex questions. She demonstrates inattention to L side and requires verbal/tactile cues to direct attention. Pt continues to demonstrate decreased insight into deficits/general problem solving, requiring consistent cues and education to promote attention to left side and education to complete simple problem solving tasks. Pt is currently tolerating a regular texture diet with thin liquids although L perioral weakness impact mastication, AP transit and timely clearing. Pt  manages medications and finances however pt was completing simple cooking/cleaning around the house and maintained more cognitive independence prior to hospitalization. Recommend SLP intervention to maximize return to prior level of function upon discharge.     Diet Solids Recommendation:   Liquid Consistency Recommendation:   Recommended Form of Meds:   Regular texture diet     Thin liquids     Meds whole with water       Recommended Compensatory Swallowing Strategies: Alternate solids/liquids , Upright as possible with all PO intake , Small bites/sips , Eat/feed slowly, Remain upright 30-45 min       Plan  Frequency: 60 minutes/day; 5 days per week, as tolerated, until goals met, or discharged from ARU.  Therapeutic Interventions: Oral Care, Diet Tolerance Monitoring , Oral Motor Exercises , Vital Stim/NMES, Patient/Family Education  Cognitive-Linguistic intervention ,

## 2024-10-05 NOTE — PROGRESS NOTES
[facility] - Inpatient Rehabilitation Department   Phone: [phone]    Physical Therapy    [] Initial Evaluation            [x] Daily Treatment Note         [] Discharge Summary      Patient: Rita Dixon   : 1955   MRN: [id number]   Date of Service:  10/5/2024  Admitting Diagnosis: Acute cerebrovascular accident (CVA) due to ischemia (HCC)  Current Admission Summary: Patient is a 68-year-old female who presented to the ED of an OSH on  for complaint of recurring weakness of her left hemibody. Patient has a PMHx: arthritis, GBM, HTN, Kidney Stone, Osteoporosis, and Seizures and a PSHx: Colonoscopy, Craniotomy, Eyes surgery, T&A and [place] Tooth extraction. Patient was recently discharged from hospital  to GBM tumor progression and underwent right frontotemporal craniotomy for resection of tumor on  per Neurosurgery. Patient had an abnormal EEG on . Patient was d/c on . On  patient was experiencing progressive weakness with neurological changes and returned to the hospital. Patient was demonstrating neurological changes, and a consult was placed to Neurology with a pending MRI Brain. On  MRI Brain was significant for Acute infarct with associated FLAIR signal abnormality in the right basal ganglia and corona radiata and acute CVA was confirmed per Neurology.   Past Medical History:  has a past medical history of Arthritis, GBM (glioblastoma multiforme) (HCC), HTN (hypertension), Kidney stones, Osteoporosis, and Seizures (HCC).  Past Surgical History:  has a past surgical history that includes brain surgery (Right, 2023); Eye surgery (Left); and craniotomy (Right, 2024).  Discharge Recommendations: To be determined based on pt's progress  DME Required For Discharge: DME to be determined pending patient progress  Precautions/Restrictions: no restrictions  Weight Bearing Restrictions: no restrictions  [] Right Upper Extremity  [] Left Upper Extremity []

## 2024-10-06 VITALS
WEIGHT: 116 LBS | BODY MASS INDEX: 22.78 KG/M2 | SYSTOLIC BLOOD PRESSURE: 159 MMHG | TEMPERATURE: 98.5 F | DIASTOLIC BLOOD PRESSURE: 88 MMHG | HEIGHT: 60 IN | OXYGEN SATURATION: 96 % | HEART RATE: 73 BPM | RESPIRATION RATE: 17 BRPM

## 2024-10-06 PROCEDURE — 1280000000 HC REHAB R&B

## 2024-10-06 PROCEDURE — 6370000000 HC RX 637 (ALT 250 FOR IP): Performed by: STUDENT IN AN ORGANIZED HEALTH CARE EDUCATION/TRAINING PROGRAM

## 2024-10-06 PROCEDURE — 6360000002 HC RX W HCPCS: Performed by: STUDENT IN AN ORGANIZED HEALTH CARE EDUCATION/TRAINING PROGRAM

## 2024-10-06 RX ADMIN — HEPARIN SODIUM 5000 UNITS: 5000 INJECTION INTRAVENOUS; SUBCUTANEOUS at 13:25

## 2024-10-06 RX ADMIN — HEPARIN SODIUM 5000 UNITS: 5000 INJECTION INTRAVENOUS; SUBCUTANEOUS at 05:16

## 2024-10-06 RX ADMIN — MELATONIN TAB 3 MG 3 MG: 3 TAB at 20:41

## 2024-10-06 RX ADMIN — LACOSAMIDE 200 MG: 100 TABLET, FILM COATED ORAL at 20:43

## 2024-10-06 RX ADMIN — ACETAMINOPHEN 650 MG: 325 TABLET ORAL at 09:09

## 2024-10-06 RX ADMIN — LEVETIRACETAM 500 MG: 100 SOLUTION ORAL at 09:09

## 2024-10-06 RX ADMIN — ATORVASTATIN CALCIUM 40 MG: 40 TABLET, FILM COATED ORAL at 20:41

## 2024-10-06 RX ADMIN — LEVETIRACETAM 500 MG: 100 SOLUTION ORAL at 20:43

## 2024-10-06 RX ADMIN — HEPARIN SODIUM 5000 UNITS: 5000 INJECTION INTRAVENOUS; SUBCUTANEOUS at 20:43

## 2024-10-06 RX ADMIN — LACOSAMIDE 200 MG: 100 TABLET, FILM COATED ORAL at 09:09

## 2024-10-06 RX ADMIN — DEXAMETHASONE 4 MG: 4 TABLET ORAL at 09:09

## 2024-10-06 RX ADMIN — STANDARDIZED SENNA CONCENTRATE AND DOCUSATE SODIUM 2 TABLET: 8.6; 5 TABLET ORAL at 09:10

## 2024-10-06 RX ADMIN — ASPIRIN 81 MG: 81 TABLET, COATED ORAL at 09:10

## 2024-10-06 RX ADMIN — OXYCODONE 10 MG: 5 TABLET ORAL at 20:42

## 2024-10-06 ASSESSMENT — PAIN DESCRIPTION - DESCRIPTORS
DESCRIPTORS: ACHING;DISCOMFORT
DESCRIPTORS: ACHING

## 2024-10-06 ASSESSMENT — PAIN DESCRIPTION - ORIENTATION: ORIENTATION: RIGHT

## 2024-10-06 ASSESSMENT — PAIN DESCRIPTION - LOCATION
LOCATION: HEAD
LOCATION: HEAD

## 2024-10-06 ASSESSMENT — PAIN SCALES - GENERAL
PAINLEVEL_OUTOF10: 5
PAINLEVEL_OUTOF10: 7
PAINLEVEL_OUTOF10: 0

## 2024-10-06 ASSESSMENT — PAIN - FUNCTIONAL ASSESSMENT: PAIN_FUNCTIONAL_ASSESSMENT: ACTIVITIES ARE NOT PREVENTED

## 2024-10-06 NOTE — PLAN OF CARE
Problem: Chronic Conditions and Co-morbidities  Goal: Patient's chronic conditions and co-morbidity symptoms are monitored and maintained or improved  Outcome: Progressing  Flowsheets (Taken 10/5/2024 2200)  Care Plan - Patient's Chronic Conditions and Co-Morbidity Symptoms are Monitored and Maintained or Improved:   Monitor and assess patient's chronic conditions and comorbid symptoms for stability, deterioration, or improvement   Collaborate with multidisciplinary team to address chronic and comorbid conditions and prevent exacerbation or deterioration   Update acute care plan with appropriate goals if chronic or comorbid symptoms are exacerbated and prevent overall improvement and discharge     Problem: Safety - Adult  Goal: Free from fall injury  10/5/2024 2334 by Génesis Tan, RN  Outcome: Progressing     Problem: Pain  Goal: Verbalizes/displays adequate comfort level or baseline comfort level  Outcome: Progressing     Problem: Skin/Tissue Integrity  Goal: Absence of new skin breakdown  Description: 1.  Monitor for areas of redness and/or skin breakdown  2.  Assess vascular access sites hourly  3.  Every 4-6 hours minimum:  Change oxygen saturation probe site  4.  Every 4-6 hours:  If on nasal continuous positive airway pressure, respiratory therapy assess nares and determine need for appliance change or resting period.  Outcome: Progressing     Problem: ABCDS Injury Assessment  Goal: Absence of physical injury  Outcome: Progressing  Flowsheets (Taken 10/5/2024 2331)  Absence of Physical Injury: Implement safety measures based on patient assessment     Problem: Nutrition Deficit:  Goal: Optimize nutritional status  Outcome: Progressing     Problem: Discharge Planning  Goal: Discharge to home or other facility with appropriate resources  Recent Flowsheet Documentation  Taken 10/5/2024 2200 by Génesis Tan, RN  Discharge to home or other facility with appropriate resources:   Identify barriers

## 2024-10-06 NOTE — PROGRESS NOTES
Evening assessments complete, pt. Calm and cooperative with care and medications. Taking medications without difficulty. Complaints of dull ache behind right eyebrow, denied need for medication. Positioned of left side for comfort and is in bed with eyes closed at this time, call light and table in reach.

## 2024-10-06 NOTE — PLAN OF CARE
Problem: Safety - Adult  Goal: Free from fall injury  10/6/2024 1116 by Meenakshi Gaona RN  Outcome: Progressing  Note: Pt remains free from falls.  Safety precautions in place.  Bed in lowest position, bed/chair wheels locked, call light with in reach, bedside table in reach, bed/chair alarm on, fall risk wrist band on.

## 2024-10-06 NOTE — PLAN OF CARE
Problem: Discharge Planning  Goal: Discharge to home or other facility with appropriate resources  Outcome: Progressing    Discharge to home or other facility with appropriate resources: Identify barriers to discharge with patient and caregiver     Problem: Chronic Conditions and Co-morbidities  Goal: Patient's chronic conditions and co-morbidity symptoms are monitored and maintained or improved  Outcome: Progressing    Care Plan - Patient's Chronic Conditions and Co-Morbidity Symptoms are Monitored and Maintained or Improved: Monitor and assess patient's chronic conditions and comorbid symptoms for stability, deterioration, or improvement     Problem: Safety - Adult  Goal: Free from fall injury  10/6/2024 1925 by Catherine Sherman, RN  Outcome: Progressing    Note: Pt remains free from falls.  Safety precautions in place.  Bed in lowest position, bed/chair wheels locked, call light with in reach, bedside table in reach, bed/chair alarm on, fall risk wrist band on.     Problem: Pain  Goal: Verbalizes/displays adequate comfort level or baseline comfort level  Outcome: Progressing     Problem: Skin/Tissue Integrity  Goal: Absence of new skin breakdown  Description: 1.  Monitor for areas of redness and/or skin breakdown  2.  Assess vascular access sites hourly  3.  Every 4-6 hours minimum:  Change oxygen saturation probe site  4.  Every 4-6 hours:  If on nasal continuous positive airway pressure, respiratory therapy assess nares and determine need for appliance change or resting period.  Outcome: Progressing     Problem: ABCDS Injury Assessment  Goal: Absence of physical injury  Outcome: Progressing     Problem: Nutrition Deficit:  Goal: Optimize nutritional status  Outcome: Progressing

## 2024-10-07 LAB
ANION GAP SERPL CALCULATED.3IONS-SCNC: 11 MMOL/L (ref 3–16)
BASOPHILS # BLD: 0 K/UL (ref 0–0.2)
BASOPHILS NFR BLD: 0 %
BUN SERPL-MCNC: 31 MG/DL (ref 7–20)
CALCIUM SERPL-MCNC: 8.8 MG/DL (ref 8.3–10.6)
CHLORIDE SERPL-SCNC: 103 MMOL/L (ref 99–110)
CO2 SERPL-SCNC: 24 MMOL/L (ref 21–32)
CREAT SERPL-MCNC: 0.6 MG/DL (ref 0.6–1.2)
DEPRECATED RDW RBC AUTO: 13.3 % (ref 12.4–15.4)
EOSINOPHIL # BLD: 0 K/UL (ref 0–0.6)
EOSINOPHIL NFR BLD: 0 %
GFR SERPLBLD CREATININE-BSD FMLA CKD-EPI: >90 ML/MIN/{1.73_M2}
GLUCOSE SERPL-MCNC: 100 MG/DL (ref 70–99)
HCT VFR BLD AUTO: 34.8 % (ref 36–48)
HGB BLD-MCNC: 11.7 G/DL (ref 12–16)
LYMPHOCYTES # BLD: 0.8 K/UL (ref 1–5.1)
LYMPHOCYTES NFR BLD: 8 %
MACROCYTES BLD QL SMEAR: ABNORMAL
MCH RBC QN AUTO: 34.1 PG (ref 26–34)
MCHC RBC AUTO-ENTMCNC: 33.7 G/DL (ref 31–36)
MCV RBC AUTO: 101.1 FL (ref 80–100)
METAMYELOCYTES NFR BLD MANUAL: 3 %
MONOCYTES # BLD: 0.9 K/UL (ref 0–1.3)
MONOCYTES NFR BLD: 9 %
MYELOCYTES NFR BLD MANUAL: 1 %
NEUTROPHILS # BLD: 8.2 K/UL (ref 1.7–7.7)
NEUTROPHILS NFR BLD: 79 %
PLATELET # BLD AUTO: 176 K/UL (ref 135–450)
PLATELET BLD QL SMEAR: ADEQUATE
PMV BLD AUTO: 7.4 FL (ref 5–10.5)
POTASSIUM SERPL-SCNC: 4.7 MMOL/L (ref 3.5–5.1)
RBC # BLD AUTO: 3.44 M/UL (ref 4–5.2)
SLIDE REVIEW: ABNORMAL
SODIUM SERPL-SCNC: 138 MMOL/L (ref 136–145)
WBC # BLD AUTO: 9.9 K/UL (ref 4–11)

## 2024-10-07 PROCEDURE — 97129 THER IVNTJ 1ST 15 MIN: CPT

## 2024-10-07 PROCEDURE — 6370000000 HC RX 637 (ALT 250 FOR IP): Performed by: STUDENT IN AN ORGANIZED HEALTH CARE EDUCATION/TRAINING PROGRAM

## 2024-10-07 PROCEDURE — 6360000002 HC RX W HCPCS: Performed by: STUDENT IN AN ORGANIZED HEALTH CARE EDUCATION/TRAINING PROGRAM

## 2024-10-07 PROCEDURE — 85025 COMPLETE CBC W/AUTO DIFF WBC: CPT

## 2024-10-07 PROCEDURE — 97530 THERAPEUTIC ACTIVITIES: CPT

## 2024-10-07 PROCEDURE — 80048 BASIC METABOLIC PNL TOTAL CA: CPT

## 2024-10-07 PROCEDURE — 92526 ORAL FUNCTION THERAPY: CPT

## 2024-10-07 PROCEDURE — 1280000000 HC REHAB R&B

## 2024-10-07 PROCEDURE — 36415 COLL VENOUS BLD VENIPUNCTURE: CPT

## 2024-10-07 PROCEDURE — 97116 GAIT TRAINING THERAPY: CPT

## 2024-10-07 PROCEDURE — 97535 SELF CARE MNGMENT TRAINING: CPT

## 2024-10-07 PROCEDURE — 97130 THER IVNTJ EA ADDL 15 MIN: CPT

## 2024-10-07 RX ORDER — NORTRIPTYLINE HCL 10 MG
10 CAPSULE ORAL NIGHTLY
Status: DISCONTINUED | OUTPATIENT
Start: 2024-10-07 | End: 2024-10-10

## 2024-10-07 RX ADMIN — HEPARIN SODIUM 5000 UNITS: 5000 INJECTION INTRAVENOUS; SUBCUTANEOUS at 21:53

## 2024-10-07 RX ADMIN — HEPARIN SODIUM 5000 UNITS: 5000 INJECTION INTRAVENOUS; SUBCUTANEOUS at 07:00

## 2024-10-07 RX ADMIN — OXYCODONE 10 MG: 5 TABLET ORAL at 21:50

## 2024-10-07 RX ADMIN — LACOSAMIDE 200 MG: 100 TABLET, FILM COATED ORAL at 21:51

## 2024-10-07 RX ADMIN — LEVETIRACETAM 500 MG: 100 SOLUTION ORAL at 09:27

## 2024-10-07 RX ADMIN — ASPIRIN 81 MG: 81 TABLET, COATED ORAL at 09:27

## 2024-10-07 RX ADMIN — LEVETIRACETAM 500 MG: 100 SOLUTION ORAL at 21:50

## 2024-10-07 RX ADMIN — DEXAMETHASONE 4 MG: 4 TABLET ORAL at 09:27

## 2024-10-07 RX ADMIN — LACOSAMIDE 200 MG: 100 TABLET, FILM COATED ORAL at 09:27

## 2024-10-07 RX ADMIN — ATORVASTATIN CALCIUM 40 MG: 40 TABLET, FILM COATED ORAL at 21:52

## 2024-10-07 RX ADMIN — HEPARIN SODIUM 5000 UNITS: 5000 INJECTION INTRAVENOUS; SUBCUTANEOUS at 15:36

## 2024-10-07 RX ADMIN — NORTRIPTYLINE HYDROCHLORIDE 10 MG: 10 CAPSULE ORAL at 21:52

## 2024-10-07 ASSESSMENT — PAIN SCALES - GENERAL
PAINLEVEL_OUTOF10: 6
PAINLEVEL_OUTOF10: 0

## 2024-10-07 ASSESSMENT — PAIN DESCRIPTION - LOCATION: LOCATION: HEAD;EYE

## 2024-10-07 ASSESSMENT — PAIN DESCRIPTION - ORIENTATION: ORIENTATION: RIGHT

## 2024-10-07 ASSESSMENT — PAIN SCALES - WONG BAKER: WONGBAKER_NUMERICALRESPONSE: NO HURT

## 2024-10-07 ASSESSMENT — PAIN DESCRIPTION - DESCRIPTORS: DESCRIPTORS: DULL

## 2024-10-07 NOTE — PATIENT CARE CONFERENCE
[facility] Inpatient Rehabilitation Department  Weekly Team Conference Note    Patient Name: Rita Dixon      MRN: [id number]  : 1955  (68 y.o.) Gender: female     The team conference for this patient was held on 10/8/24 at 1100 am and was led by:  Bassem Mueller MD     CASE MANAGEMENT:  Assessment:  Patient is from home and lives with her spouse.  She is normally totally independent with her ADLs.  Pt came to our ARU with a stroke and is currently a 2 person assist.  Discharge date and disposition in undetermined at this time.  Will continue to follow.    PHYSICAL THERAPY    Current Status:  Bed Mobility:  Supine to Sit: 2 person assistance with Mod A of 2   Rolling Left: moderate assistance of 1  Comments: max VC for sequencing, Pt used bed rail for helping with rolling  Transfers:  Sit to stand transfer: maximum assistance  Stand to sit transfer: maximum assistance  Stand pivot transfer: Mod A of 2 for stand pivot to right side from recliner to w/c, w/c to shower chair, MAx A of 1 and Mod A of 1 to go to the L from shower chair to w/c  Comments: Blocking L knee  Ambulation:  Ambulation not tested on this date secondary to focused on balance and postural control.  Distance:   Gait Mechanics:    Comments:    Stair Mobility:  Stair mobility not completed on this date.  Comments:  Goals:      Time Frame: 2-3 weeks   Patient will complete bed mobility at minimal assistance   Patient will complete transfers at moderate assistance   Patient will ambulate 15 ft with use of LRAD at maximum assistance  Patient will complete manual w/c propulsion 100 ft at modified independent  Patient will demonstrate static sitting balance at minimal assistance    Above goals reviewed today.  All goals are ongoing at this time unless indicated above.     These goals were reviewed with this patient at the time of assessment and Rita Dixon is in agreement.     Plan of Care: Pt to be seen 5 out of 7  days per week per ARU protocol ( 60 minutes with PT)             OCCUPATIONAL THERAPY    ADLS:  Feeding: setup assistance  Feeding Comments: left with breakfast at end of session   Grooming: moderate assistance  Grooming Comments: able to apply deodorant to R underarm using RUE, assist for holding up LUE while patient applied with RUE. Assist for combing through hair due to knots and to avoid staples   Upper Extremity Bathing: maximum assistance  Lower Extremity Bathing: dependent   Bathing Comments: shower completed seated on bariatric BSC in walk in shower. Feet propped up on stool. Min-modA for sitting balance. Cues for maintaining midline. Increased ability to correct with use of R grab bar. Pericare completed while seated and while in stance once water turned off (assist of 2 for standing and washing/drying rear periarea and back of legs     Upper Extremity Dressing: maximum assistance  Lower Extremity Dressing: dependent  Dressing Comments: dressing completed seated on bariatric BSC. maxA doffing/donning shirt. maxA threading pullup/pants- assist of 2 for managing over hips in stance- maxA of 1 for standing and total A of another for clothing mgmt. Able to doff R sock, total A donning.    Toileting: dependent.    Toileting Comments: voided urine and medium BM seated on BSC. Total A for pericare while seated on BSC. Pt required assist of 2 for pants/brief mgmt and thoroughness of pericare in stance    General Comments: patient tolerated shower wel    Transfers:   Max + modA     Goals:               Patient Goals: to return to PLOF and to go home   Short Term Goals:  Time Frame: 1-2 weeks  Patient will complete upper body ADL at moderate assistance   Patient will complete grooming at minimal assistance   Patient will complete functional transfers at maximum assistance   Patient will sit EOB with Phillip for improved ADL completion  Long Term Goals:  To be completed in: 3 weeks   Patient will complete upper body ADL

## 2024-10-07 NOTE — PROGRESS NOTES
[facility] - Inpatient Rehabilitation Department   Phone: [phone]    Occupational Therapy    [] Initial Evaluation            [x] Daily Treatment Note         [] Discharge Summary      Patient: Rita Dixon   : 1955   MRN: [id number]   Date of Service:  10/7/2024    Admitting Diagnosis:  Acute cerebrovascular accident (CVA) due to ischemia (HCC)  Current Admission Summary:   Rita Dixon is a 68 y.o. female with PMHx notable for hypertension, GBM s/p multiple resection (, 24), focal epilepsy who presented to [facility] on 24 with left-sided weakness.  Following her recent craniotomy for resection of right anterolateral temporal lobe GBM she developed generalized tonic-clonic seizure, with some left-sided weakness.  Weakness improved and patient was able to return home on , however she returned on  with worsening left-sided weakness.  MRI brain confirmed acute infarct involving the right basal ganglia and corona radiata.  Neurology was consulted, recommended permissive hypertension noting a pressure dependent exam, so home blood pressure medications were held.  She was continued on her home antiepileptics, as well as a dexamethasone taper per neurosurgery.     Patient reports that she is doing well today.  She had a mild right-sided headache which responded well to Tylenol.  Reports inability to move her left arm or leg.  Reports some diminished visual acuity that is chronic, no acute vision changes.  Having some difficulty swallowing pills this morning.  Denies any chest pain, dyspnea, abdominal pain  Past Medical History:  has a past medical history of Arthritis, GBM (glioblastoma multiforme) (HCC), HTN (hypertension), Kidney stones, Osteoporosis, and Seizures (HCC).  Past Surgical History:  has a past surgical history that includes brain surgery (Right, 2023); Eye surgery (Left); and craniotomy (Right, 2024).    Discharge Recommendations: Pending

## 2024-10-07 NOTE — PROGRESS NOTES
succinate 50 mg daily, and valsartan 80 mg daily  - Hydralazine 10 mg PO every 8 hours as needed for SBP > 180 mmHg    #. Adjustment disorder w/ depressed mood  - Spiritual care consult.  - Continue to monitor, low threshold to initiate SSRI.     CODE: Full Code  Diet: ADULT DIET; Regular  ADULT ORAL NUTRITION SUPPLEMENT; Breakfast, Lunch, Dinner; Clear Liquid Oral Supplement  Bowels: Per protocol  Bladder: Per protocol   Sleep: Melatonin 3 mg nightly as needed   DVT PPx: Heparin 5000 units subcutaneous every 8 hours  ELOS: 23 days  Follow-ups: NSGY, Neurology, Oncology, Rad-Onc, PCP          Bassem Mueller MD 10/7/2024, 4:19 PM    * This document was created using dictation software.  While all precautions were taken to ensure accuracy, errors may have occurred.  Please disregard any typographical errors.

## 2024-10-07 NOTE — PROGRESS NOTES
[facility] - Inpatient Rehabilitation Department   Phone: [phone]    Physical Therapy    [] Initial Evaluation            [x] Daily Treatment Note         [] Discharge Summary      Patient: Rita Dixon   : 1955   MRN: [id number]   Date of Service:  10/7/2024  Admitting Diagnosis: Acute cerebrovascular accident (CVA) due to ischemia (HCC)  Current Admission Summary: Patient is a 68-year-old female who presented to the ED of an OSH on  for complaint of recurring weakness of her left hemibody. Patient has a PMHx: arthritis, GBM, HTN, Kidney Stone, Osteoporosis, and Seizures and a PSHx: Colonoscopy, Craniotomy, Eyes surgery, T&A and [place] Tooth extraction. Patient was recently discharged from hospital  to GBM tumor progression and underwent right frontotemporal craniotomy for resection of tumor on  per Neurosurgery. Patient had an abnormal EEG on . Patient was d/c on . On  patient was experiencing progressive weakness with neurological changes and returned to the hospital. Patient was demonstrating neurological changes, and a consult was placed to Neurology with a pending MRI Brain. On  MRI Brain was significant for Acute infarct with associated FLAIR signal abnormality in the right basal ganglia and corona radiata and acute CVA was confirmed per Neurology.   Past Medical History:  has a past medical history of Arthritis, GBM (glioblastoma multiforme) (HCC), HTN (hypertension), Kidney stones, Osteoporosis, and Seizures (HCC).  Past Surgical History:  has a past surgical history that includes brain surgery (Right, 2023); Eye surgery (Left); and craniotomy (Right, 2024).  Discharge Recommendations: To be determined based on pt's progress  DME Required For Discharge: DME to be determined pending patient progress  Precautions/Restrictions: no restrictions  Weight Bearing Restrictions: no restrictions  [] Right Upper Extremity  [] Left Upper Extremity []  treatment of the patient.  Co-signed and supervised by: Franki Tracey, DPT [id number]

## 2024-10-08 PROCEDURE — 1280000000 HC REHAB R&B

## 2024-10-08 PROCEDURE — 97130 THER IVNTJ EA ADDL 15 MIN: CPT

## 2024-10-08 PROCEDURE — 97112 NEUROMUSCULAR REEDUCATION: CPT

## 2024-10-08 PROCEDURE — 97530 THERAPEUTIC ACTIVITIES: CPT

## 2024-10-08 PROCEDURE — 97129 THER IVNTJ 1ST 15 MIN: CPT

## 2024-10-08 PROCEDURE — 6370000000 HC RX 637 (ALT 250 FOR IP): Performed by: STUDENT IN AN ORGANIZED HEALTH CARE EDUCATION/TRAINING PROGRAM

## 2024-10-08 PROCEDURE — 6360000002 HC RX W HCPCS: Performed by: STUDENT IN AN ORGANIZED HEALTH CARE EDUCATION/TRAINING PROGRAM

## 2024-10-08 RX ADMIN — NORTRIPTYLINE HYDROCHLORIDE 10 MG: 10 CAPSULE ORAL at 22:52

## 2024-10-08 RX ADMIN — LEVETIRACETAM 500 MG: 100 SOLUTION ORAL at 08:57

## 2024-10-08 RX ADMIN — DEXAMETHASONE 4 MG: 4 TABLET ORAL at 08:57

## 2024-10-08 RX ADMIN — ASPIRIN 81 MG: 81 TABLET, COATED ORAL at 08:57

## 2024-10-08 RX ADMIN — LACOSAMIDE 200 MG: 100 TABLET, FILM COATED ORAL at 22:51

## 2024-10-08 RX ADMIN — LACOSAMIDE 200 MG: 100 TABLET, FILM COATED ORAL at 08:57

## 2024-10-08 RX ADMIN — HEPARIN SODIUM 5000 UNITS: 5000 INJECTION INTRAVENOUS; SUBCUTANEOUS at 15:42

## 2024-10-08 RX ADMIN — HEPARIN SODIUM 5000 UNITS: 5000 INJECTION INTRAVENOUS; SUBCUTANEOUS at 22:51

## 2024-10-08 RX ADMIN — LEVETIRACETAM 500 MG: 100 SOLUTION ORAL at 22:55

## 2024-10-08 RX ADMIN — ATORVASTATIN CALCIUM 40 MG: 40 TABLET, FILM COATED ORAL at 22:50

## 2024-10-08 RX ADMIN — HEPARIN SODIUM 5000 UNITS: 5000 INJECTION INTRAVENOUS; SUBCUTANEOUS at 06:26

## 2024-10-08 ASSESSMENT — PAIN SCALES - WONG BAKER: WONGBAKER_NUMERICALRESPONSE: NO HURT

## 2024-10-08 ASSESSMENT — PAIN SCALES - GENERAL
PAINLEVEL_OUTOF10: 0
PAINLEVEL_OUTOF10: 5

## 2024-10-08 ASSESSMENT — PAIN - FUNCTIONAL ASSESSMENT: PAIN_FUNCTIONAL_ASSESSMENT: ACTIVITIES ARE NOT PREVENTED

## 2024-10-08 ASSESSMENT — PAIN DESCRIPTION - ORIENTATION: ORIENTATION: RIGHT

## 2024-10-08 ASSESSMENT — PAIN DESCRIPTION - LOCATION: LOCATION: HEAD;EYE

## 2024-10-08 NOTE — CARE COORDINATION
Case Management Assessment  Initial Evaluation    Date/Time of Evaluation: 10/8/2024 6:22 PM  Assessment Completed by: WESLEY Baker, ALEKSANDERW    If patient is discharged prior to next notation, then this note serves as note for discharge by case management.    Patient Name: Rita Dixon                   YOB: 1955  Diagnosis: Acute cerebrovascular accident (CVA) due to ischemia (HCC) [I63.9]                   Date / Time: 10/2/2024  6:01 PM    Patient Admission Status: REHAB IP   Readmission Risk (Low < 19, Mod (19-27), High > 27): Readmission Risk Score: 11.9    Current PCP: Unknown, Provider  PCP verified by CM? Yes    Chart Reviewed: Yes      History Provided by: Patient  Patient Orientation: Alert and Oriented    Patient Cognition: Alert    Hospitalization in the last 30 days (Readmission):  No    If yes, Readmission Assessment in CM Navigator will be completed.    Advance Directives:      Code Status: Full Code   Patient's Primary Decision Maker is: Legal Next of Kin      Discharge Planning:    Patient lives with: Spouse/Significant Other Type of Home: House (1 level - 2 steps)  Primary Care Giver: Self  Patient Support Systems include: Spouse/Significant Other   Current Financial resources: None  Current community resources: None  Current services prior to admission: Durable Medical Equipment            Current DME: Other (Comment) (shower chair)            Type of Home Care services:  None    ADLS  Prior functional level: Independent in ADLs/IADLs  Current functional level: Mobility (2 person assist)    PT AM-PAC:   /24  OT AM-PAC:   /24    Family can provide assistance at DC: Yes  Would you like Case Management to discuss the discharge plan with any other family members/significant others, and if so, who? Yes (w/spouse)  Plans to Return to Present Housing: Unknown at present  Other Identified Issues/Barriers to RETURNING to current housing: None  Potential Assistance needed at discharge: Other

## 2024-10-08 NOTE — PLAN OF CARE
Problem: Discharge Planning  Goal: Discharge to home or other facility with appropriate resources  Outcome: Progressing     Problem: Chronic Conditions and Co-morbidities  Goal: Patient's chronic conditions and co-morbidity symptoms are monitored and maintained or improved  Outcome: Progressing     Problem: Safety - Adult  Goal: Free from fall injury  Outcome: Progressing     Problem: Pain  Goal: Verbalizes/displays adequate comfort level or baseline comfort level  Outcome: Progressing  Flowsheets (Taken 10/7/2024 2015)  Verbalizes/displays adequate comfort level or baseline comfort level: Encourage patient to monitor pain and request assistance     Problem: Skin/Tissue Integrity  Goal: Absence of new skin breakdown  Description: 1.  Monitor for areas of redness and/or skin breakdown  2.  Assess vascular access sites hourly  3.  Every 4-6 hours minimum:  Change oxygen saturation probe site  4.  Every 4-6 hours:  If on nasal continuous positive airway pressure, respiratory therapy assess nares and determine need for appliance change or resting period.  Outcome: Progressing     Problem: ABCDS Injury Assessment  Goal: Absence of physical injury  Outcome: Progressing     Problem: Nutrition Deficit:  Goal: Optimize nutritional status  Outcome: Progressing

## 2024-10-08 NOTE — PROGRESS NOTES
Rita Dixon  10/8/2024  [id number]    Chief Complaint: Acute cerebrovascular accident (CVA) due to ischemia (HCC)    Subjective    No acute events overnight.    Patient reports that she is doing well today.  She slept poorly overnight, and is feeling fatigued today.  She says that she is feeling a little overwhelmed by the number of visitors at times, and is hoping to go to bed a little earlier tonight.  Continues to have headache pain localized to right frontal/retro-orbital, for which she is occasionally requesting as needed oxycodone especially in the evening time.    Last BM: Stool Occurrence: 0 (10/08/24 1049)        Objective    Patient Vitals for the past 24 hrs:   BP Temp Temp src Pulse Resp SpO2   10/08/24 0856 (!) 149/78 98.1 °F (36.7 °C) Oral 80 16 96 %   10/07/24 2220 -- -- -- -- 16 --   10/07/24 2015 (!) 152/74 98.7 °F (37.1 °C) Oral 76 16 96 %     Gen: No distress, pleasant.   HEENT: Right temporal craniotomy incision is closed with staples, well healed without erythema or drainage.   CV: Regular rate and rhythm. Extremities warm, well perfused.   Resp: No respiratory distress. CTAB.  Abd: Soft, nontender.  Ext: No edema.   Neuro: Alert, oriented, appropriately interactive. Flaccid left hemiparesis, and left inattention.     Laboratory data: Available via EMR.     Therapy progress:       PT    Supine to Sit: Substantial/maximal assistance  Sit to Supine: Dependent   Sit to Stand: Substantial/maximal assistance  Chair/Bed to Chair Transfer: Substantial/maximal assistance  Car Transfer:    Ambulation 10 ft:    Ambulation 50 ft:    Ambulation 150 ft:    Stairs - 1 Step:    Stairs - 4 Step:    Stairs - 12 Step:      OT    Eating: Setup or clean-up assistance  Oral Hygiene: Supervision or touching assistance  Bathing: Dependent  Upper Body Dressing: Substantial/maximal assistance  Lower Body Dressing: Dependent  Toilet Transfer: Dependent  Toilet Hygiene: Dependent    Speech Therapy    Pt presents with  Performing Laboratory: [id number] Performing Laboratory: [id number] moderate-severe. Added nortriptyline 10 mg qhs on 10/7.  - BMP, CBC stable on 10/7     #. HTN  #. Bradycardia  - Continue holding home metoprolol succinate 50 mg daily, and valsartan 80 mg daily  - Hydralazine 10 mg PO every 8 hours as needed for SBP > 180 mmHg    #. Adjustment disorder w/ depressed mood  - Spiritual care consult.  - Continue to monitor, low threshold to initiate SSRI.     CODE: Full Code  Diet: ADULT DIET; Regular  ADULT ORAL NUTRITION SUPPLEMENT; Breakfast, Lunch, Dinner; Clear Liquid Oral Supplement  Bowels: Per protocol  Bladder: Per protocol   Sleep: Melatonin 3 mg nightly as needed   DVT PPx: Heparin 5000 units subcutaneous every 8 hours  ELOS: 23 days  Follow-ups: NSGY, Neurology, Oncology, Rad-Onc, PCP        Interdisciplinary team conference was held today with entire rehab treatment team including PT, OT, SLP (if applicable), Dietician, RN, and SW. Discussion focused on progress toward rehab goals and discharge planning. Making progress. Barriers include lack of motor recovery, level of assist, pain control, mood. We as a medical team, and I as the physician , made a plan to work on these barriers to facilitate safe discharge. Plan will be presented to patient/family (if available).      Bassem Mueller MD 10/8/2024, 4:17 PM    * This document was created using dictation software.  While all precautions were taken to ensure accuracy, errors may have occurred.  Please disregard any typographical errors.

## 2024-10-08 NOTE — PROGRESS NOTES
[facility] - Inpatient Rehabilitation Department   Phone: [phone]    Speech Therapy    [] Initial Evaluation            [x] Daily Treatment Note         [] Discharge Summary      Patient: Rita Dixon   : 1955   MRN: [id number]   Date of Service:  10/8/2024  Admitting Diagnosis: Acute cerebrovascular accident (CVA) due to ischemia (HCC)  Current Admission Summary: See rehab MD note  Past Medical History:  has a past medical history of Arthritis, GBM (glioblastoma multiforme) (HCC), HTN (hypertension), Kidney stones, Osteoporosis, and Seizures (HCC).  Past Surgical History:  has a past surgical history that includes brain surgery (Right, 2023); Eye surgery (Left); and craniotomy (Right, 2024).  Recent MRI Brain: 24  1. Acute infarct with associated FLAIR signal abnormality in the right basal ganglia and corona radiata. No significant midline shift or evidence of hemorrhagic conversion.   2. Mild diffusion restriction in the right hippocampus may represent sequela of seizures or ischemia.   3. Postoperative changes of right anterior temporal mass resection. Punctate foci of enhancement at the resection site are favored postsurgical, with attention on follow-up recommended.   4. Unchanged trace intraventricular hemorrhage with unchanged ventricular size and configuration.   Instrumental Swallow Study: 24  Patient presents with grossly normal oropharyngeal swallow function with observed flash laryngeal penetration of thin liquids and puree intermittently which fully clears during the swallow. There is no significant post-swallow residue in the pharynx. Mastication is timely and effective, with adequate oral clearance. Swallow safety and swallow efficiency are grossly WFL.   Precautions/Restrictions: high fall risk        Pre-Admission Information   Living Status: Pt lives at home with  who manages medications and finances. Pt previously assisted with simple  upright 30-45 min       Plan  Frequency: 60 minutes/day; 5 days per week, as tolerated, until goals met, or discharged from ARU.  Therapeutic Interventions: Oral Care, Diet Tolerance Monitoring , Oral Motor Exercises , Vital Stim/NMES, Patient/Family Education  Cognitive-Linguistic intervention , Compensatory Cognitive intervention , and Patient/ Family education     Discharge  Barriers to discharge: Inability to communicate or demonstrate problem solving due to cognitive-communicative impairment, Inability to manage medications/finances  Discharge Recommendations: 24 hour supervision  Continued SLP at Discharge: Yes  and TBD based upon progress     Goals  Patient Goals: \"To get back home to my house and function like I did before all this\"    Time Frame: 14-21 days    Pt will tolerate recommended diet with no overt s/s of aspiration.   Patient will complete functional problem-solving tasks for daily situations with 80% accuracy or given min cues.  Pt will complete word associative tasks to improve mental flexibility, complex thinking, and working memory skills with 80% accuracy.    Patient will be instructed in memory strategies to utilize in order to promote recall of newly learned information with >80% min cues.  Pt will improve visual perception/L hemibody awareness for functional task completion via graded tasks to mild cues  Pt will attend to current task with < 3 verbal redirections.    Above goals reviewed on 10/8/2024. All goals are ongoing at this time unless indicated above.       Therapy Session Time      Session 1 Session 2   Time In 0930    Time Out 1030    Time Code Minutes 60    Individual Minutes 60      Timed Code Treatment Minutes: 60  Total Treatment Minutes: 60    Electronically Signed By:  Mckenzie Maxwell M.A., CCC-SLP #[id number] 10/8/2024 7:49 AM  Speech-Language Pathologist

## 2024-10-08 NOTE — PROGRESS NOTES
[facility] - Inpatient Rehabilitation Department   Phone: [phone]    Physical Therapy    [] Initial Evaluation            [x] Daily Treatment Note         [] Discharge Summary      Patient: Rita Dixon   : 1955   MRN: [id number]   Date of Service:  10/8/2024  Admitting Diagnosis: Acute cerebrovascular accident (CVA) due to ischemia (HCC)  Current Admission Summary: Patient is a 68-year-old female who presented to the ED of an OSH on  for complaint of recurring weakness of her left hemibody. Patient has a PMHx: arthritis, GBM, HTN, Kidney Stone, Osteoporosis, and Seizures and a PSHx: Colonoscopy, Craniotomy, Eyes surgery, T&A and [place] Tooth extraction. Patient was recently discharged from hospital  to GBM tumor progression and underwent right frontotemporal craniotomy for resection of tumor on  per Neurosurgery. Patient had an abnormal EEG on . Patient was d/c on . On  patient was experiencing progressive weakness with neurological changes and returned to the hospital. Patient was demonstrating neurological changes, and a consult was placed to Neurology with a pending MRI Brain. On  MRI Brain was significant for Acute infarct with associated FLAIR signal abnormality in the right basal ganglia and corona radiata and acute CVA was confirmed per Neurology.   Past Medical History:  has a past medical history of Arthritis, GBM (glioblastoma multiforme) (HCC), HTN (hypertension), Kidney stones, Osteoporosis, and Seizures (HCC).  Past Surgical History:  has a past surgical history that includes brain surgery (Right, 2023); Eye surgery (Left); and craniotomy (Right, 2024).  Discharge Recommendations: To be determined based on pt's progress  DME Required For Discharge: DME to be determined pending patient progress  Precautions/Restrictions: no restrictions  Weight Bearing Restrictions: no restrictions  [] Right Upper Extremity  [] Left Upper Extremity []

## 2024-10-08 NOTE — PROGRESS NOTES
[facility] - Inpatient Rehabilitation Department   Phone: [phone]    Occupational Therapy    [] Initial Evaluation            [x] Daily Treatment Note         [] Discharge Summary      Patient: Rita Dixon   : 1955   MRN: [id number]   Date of Service:  10/8/2024    Admitting Diagnosis:  Acute cerebrovascular accident (CVA) due to ischemia (HCC)  Current Admission Summary:   Rita Dixon is a 68 y.o. female with PMHx notable for hypertension, GBM s/p multiple resection (, 24), focal epilepsy who presented to [facility] on 24 with left-sided weakness.  Following her recent craniotomy for resection of right anterolateral temporal lobe GBM she developed generalized tonic-clonic seizure, with some left-sided weakness.  Weakness improved and patient was able to return home on , however she returned on  with worsening left-sided weakness.  MRI brain confirmed acute infarct involving the right basal ganglia and corona radiata.  Neurology was consulted, recommended permissive hypertension noting a pressure dependent exam, so home blood pressure medications were held.  She was continued on her home antiepileptics, as well as a dexamethasone taper per neurosurgery.     Patient reports that she is doing well today.  She had a mild right-sided headache which responded well to Tylenol.  Reports inability to move her left arm or leg.  Reports some diminished visual acuity that is chronic, no acute vision changes.  Having some difficulty swallowing pills this morning.  Denies any chest pain, dyspnea, abdominal pain  Past Medical History:  has a past medical history of Arthritis, GBM (glioblastoma multiforme) (HCC), HTN (hypertension), Kidney stones, Osteoporosis, and Seizures (HCC).  Past Surgical History:  has a past surgical history that includes brain surgery (Right, 2023); Eye surgery (Left); and craniotomy (Right, 2024).    Discharge Recommendations: Pending  RUE. Task required patient to reach laterally with to retrieve pieces and then reach forward to place piece in connect 4 board. Assist to initiate task and reach forward to table. Patient's trunk supported by wc during task. Patient required verbal cueing to visually track when reaching for pieces. Patient fatigued quickly during task but demonstrated improved attention to task when fully supported in wc.     Patient left in wc with LUE in arm trough and BLE feet propped on stool. Alarm on, call button in reach and all needs met.         Functional Outcomes           Cognition  Overall Cognitive Status: Impaired  Arousal/Alertness: appropriate responses to stimuli  Following Commands: follows one step commands consistently  Attention Span: difficulty dividing attention  Memory: appears intact  Safety Judgement: decreased awareness of need for assistance, decreased awareness of need for safety  Problem Solving: assistance required to generate solutions, decreased awareness of errors  Insights: decreased awareness of deficits  Initiation: requires cues for some  Sequencing: requires cues for some  Comments: continue to assess   Orientation:    alert and oriented x 4  Command Following:   accurately follows one step commands     Education  Barriers To Learning: cognition and visual  Patient Education: patient educated on goals, OT role and benefits, plan of care, ADL adaptive strategies, disease specific education, transfer training, discharge recommendations  Learning Assessment:  patient will require reinforcement due to cognitive deficits    Assessment  Activity Tolerance: poor- patient fatiguing quickly   Impairments Requiring Therapeutic Intervention: decreased functional mobility, decreased ADL status, decreased ROM, decreased strength, decreased cognition, decreased endurance, decreased balance, decreased fine motor control, decreased coordination, decreased posture  Prognosis: good  Clinical Assessment: Patient

## 2024-10-09 PROCEDURE — 97130 THER IVNTJ EA ADDL 15 MIN: CPT

## 2024-10-09 PROCEDURE — 97530 THERAPEUTIC ACTIVITIES: CPT

## 2024-10-09 PROCEDURE — 6370000000 HC RX 637 (ALT 250 FOR IP): Performed by: STUDENT IN AN ORGANIZED HEALTH CARE EDUCATION/TRAINING PROGRAM

## 2024-10-09 PROCEDURE — 97129 THER IVNTJ 1ST 15 MIN: CPT

## 2024-10-09 PROCEDURE — 97116 GAIT TRAINING THERAPY: CPT

## 2024-10-09 PROCEDURE — 1280000000 HC REHAB R&B

## 2024-10-09 PROCEDURE — 97112 NEUROMUSCULAR REEDUCATION: CPT

## 2024-10-09 PROCEDURE — 6360000002 HC RX W HCPCS: Performed by: STUDENT IN AN ORGANIZED HEALTH CARE EDUCATION/TRAINING PROGRAM

## 2024-10-09 PROCEDURE — 97535 SELF CARE MNGMENT TRAINING: CPT

## 2024-10-09 RX ADMIN — HEPARIN SODIUM 5000 UNITS: 5000 INJECTION INTRAVENOUS; SUBCUTANEOUS at 21:32

## 2024-10-09 RX ADMIN — NORTRIPTYLINE HYDROCHLORIDE 10 MG: 10 CAPSULE ORAL at 21:32

## 2024-10-09 RX ADMIN — LEVETIRACETAM 500 MG: 100 SOLUTION ORAL at 08:38

## 2024-10-09 RX ADMIN — ASPIRIN 81 MG: 81 TABLET, COATED ORAL at 08:38

## 2024-10-09 RX ADMIN — LACOSAMIDE 200 MG: 100 TABLET, FILM COATED ORAL at 08:38

## 2024-10-09 RX ADMIN — ATORVASTATIN CALCIUM 40 MG: 40 TABLET, FILM COATED ORAL at 21:32

## 2024-10-09 RX ADMIN — LEVETIRACETAM 500 MG: 100 SOLUTION ORAL at 21:32

## 2024-10-09 RX ADMIN — HEPARIN SODIUM 5000 UNITS: 5000 INJECTION INTRAVENOUS; SUBCUTANEOUS at 14:44

## 2024-10-09 RX ADMIN — LACOSAMIDE 200 MG: 100 TABLET, FILM COATED ORAL at 21:32

## 2024-10-09 RX ADMIN — MELATONIN TAB 3 MG 3 MG: 3 TAB at 21:32

## 2024-10-09 RX ADMIN — HEPARIN SODIUM 5000 UNITS: 5000 INJECTION INTRAVENOUS; SUBCUTANEOUS at 06:25

## 2024-10-09 RX ADMIN — OXYCODONE 10 MG: 5 TABLET ORAL at 21:30

## 2024-10-09 RX ADMIN — ACETAMINOPHEN 650 MG: 325 TABLET ORAL at 06:24

## 2024-10-09 ASSESSMENT — PAIN SCALES - GENERAL
PAINLEVEL_OUTOF10: 7
PAINLEVEL_OUTOF10: 7
PAINLEVEL_OUTOF10: 5
PAINLEVEL_OUTOF10: 0

## 2024-10-09 ASSESSMENT — PAIN DESCRIPTION - LOCATION
LOCATION: HEAD

## 2024-10-09 ASSESSMENT — PAIN DESCRIPTION - DESCRIPTORS
DESCRIPTORS: ACHING

## 2024-10-09 ASSESSMENT — PAIN - FUNCTIONAL ASSESSMENT
PAIN_FUNCTIONAL_ASSESSMENT: ACTIVITIES ARE NOT PREVENTED

## 2024-10-09 ASSESSMENT — PAIN SCALES - WONG BAKER
WONGBAKER_NUMERICALRESPONSE: NO HURT

## 2024-10-09 ASSESSMENT — PAIN DESCRIPTION - ORIENTATION: ORIENTATION: LEFT

## 2024-10-09 NOTE — PROGRESS NOTES
[facility] - Inpatient Rehabilitation Department   Phone: [phone]    Speech Therapy    [] Initial Evaluation            [x] Daily Treatment Note         [] Discharge Summary      Patient: Rita Dixon   : 1955   MRN: [id number]   Date of Service:  10/9/2024  Admitting Diagnosis: Acute cerebrovascular accident (CVA) due to ischemia (HCC)  Current Admission Summary: See rehab MD note  Past Medical History:  has a past medical history of Arthritis, GBM (glioblastoma multiforme) (HCC), HTN (hypertension), Kidney stones, Osteoporosis, and Seizures (HCC).  Past Surgical History:  has a past surgical history that includes brain surgery (Right, 2023); Eye surgery (Left); and craniotomy (Right, 2024).  Recent MRI Brain: 24  1. Acute infarct with associated FLAIR signal abnormality in the right basal ganglia and corona radiata. No significant midline shift or evidence of hemorrhagic conversion.   2. Mild diffusion restriction in the right hippocampus may represent sequela of seizures or ischemia.   3. Postoperative changes of right anterior temporal mass resection. Punctate foci of enhancement at the resection site are favored postsurgical, with attention on follow-up recommended.   4. Unchanged trace intraventricular hemorrhage with unchanged ventricular size and configuration.   Instrumental Swallow Study: 24  Patient presents with grossly normal oropharyngeal swallow function with observed flash laryngeal penetration of thin liquids and puree intermittently which fully clears during the swallow. There is no significant post-swallow residue in the pharynx. Mastication is timely and effective, with adequate oral clearance. Swallow safety and swallow efficiency are grossly WFL.   Precautions/Restrictions: high fall risk        Pre-Admission Information   Living Status: Pt lives at home with  who manages medications and finances. Pt previously assisted with simple

## 2024-10-09 NOTE — PROGRESS NOTES
[facility] - Inpatient Rehabilitation Department   Phone: [phone]    Occupational Therapy    [] Initial Evaluation            [x] Daily Treatment Note         [] Discharge Summary      Patient: Rita Dixon   : 1955   MRN: [id number]   Date of Service:  10/9/2024    Admitting Diagnosis:  Acute cerebrovascular accident (CVA) due to ischemia (HCC)  Current Admission Summary:   Rita Dixon is a 68 y.o. female with PMHx notable for hypertension, GBM s/p multiple resection (, 24), focal epilepsy who presented to [facility] on 24 with left-sided weakness.  Following her recent craniotomy for resection of right anterolateral temporal lobe GBM she developed generalized tonic-clonic seizure, with some left-sided weakness.  Weakness improved and patient was able to return home on , however she returned on  with worsening left-sided weakness.  MRI brain confirmed acute infarct involving the right basal ganglia and corona radiata.  Neurology was consulted, recommended permissive hypertension noting a pressure dependent exam, so home blood pressure medications were held.  She was continued on her home antiepileptics, as well as a dexamethasone taper per neurosurgery.     Patient reports that she is doing well today.  She had a mild right-sided headache which responded well to Tylenol.  Reports inability to move her left arm or leg.  Reports some diminished visual acuity that is chronic, no acute vision changes.  Having some difficulty swallowing pills this morning.  Denies any chest pain, dyspnea, abdominal pain  Past Medical History:  has a past medical history of Arthritis, GBM (glioblastoma multiforme) (HCC), HTN (hypertension), Kidney stones, Osteoporosis, and Seizures (HCC).  Past Surgical History:  has a past surgical history that includes brain surgery (Right, 2023); Eye surgery (Left); and craniotomy (Right, 2024).    Discharge Recommendations: Pending

## 2024-10-09 NOTE — PROGRESS NOTES
Rita Dixon  10/9/2024  [id number]    Chief Complaint: Acute cerebrovascular accident (CVA) due to ischemia (HCC)    Subjective    No acute events overnight.    Patient reports that she is doing well today overall.  Slept marginally better overnight last night.  Complaining of right neck and posterior shoulder pain.  Staff noted significant leftward head lean while patient was sleeping in bed this morning.  Continues to have mild right sided headache pain, though improving from a few days ago.  Denies any other new concerns    Last BM: Stool Occurrence: 0 (10/08/24 1049)        Objective    Patient Vitals for the past 24 hrs:   BP Temp Temp src Pulse Resp SpO2   10/09/24 0830 (!) 154/70 98.8 °F (37.1 °C) Oral 71 16 99 %   10/08/24 2245 (!) 149/88 98.5 °F (36.9 °C) Oral 81 16 99 %     Gen: No distress, pleasant.   HEENT: Right temporal craniotomy incision is closed with staples, well healed without erythema or drainage.   CV: Regular rate and rhythm. Extremities warm, well perfused.   Resp: No respiratory distress. CTAB.  Abd: Soft, nontender.  Ext: No edema.   Neuro: Alert, oriented, appropriately interactive. Flaccid left hemiparesis, and left inattention.     Laboratory data: Available via EMR.     Therapy progress:       PT    Supine to Sit: Substantial/maximal assistance  Sit to Supine: Dependent   Sit to Stand: Substantial/maximal assistance  Chair/Bed to Chair Transfer: Substantial/maximal assistance  Car Transfer:    Ambulation 10 ft:    Ambulation 50 ft:    Ambulation 150 ft:    Stairs - 1 Step:    Stairs - 4 Step:    Stairs - 12 Step:      OT    Eating: Setup or clean-up assistance  Oral Hygiene: Supervision or touching assistance  Bathing: Dependent  Upper Body Dressing: Substantial/maximal assistance  Lower Body Dressing: Dependent  Toilet Transfer: Dependent  Toilet Hygiene: Dependent    Speech Therapy    Pt presents with moderate cognitive-linguistic deficits within the domains of immediate and

## 2024-10-09 NOTE — PROGRESS NOTES
[facility] - Inpatient Rehabilitation Department   Phone: [phone]    Physical Therapy    [] Initial Evaluation            [x] Daily Treatment Note         [] Discharge Summary      Patient: Rita Dixon   : 1955   MRN: [id number]   Date of Service:  10/9/2024  Admitting Diagnosis: Acute cerebrovascular accident (CVA) due to ischemia (HCC)  Current Admission Summary: Patient is a 68-year-old female who presented to the ED of an OSH on  for complaint of recurring weakness of her left hemibody. Patient has a PMHx: arthritis, GBM, HTN, Kidney Stone, Osteoporosis, and Seizures and a PSHx: Colonoscopy, Craniotomy, Eyes surgery, T&A and [place] Tooth extraction. Patient was recently discharged from hospital  to GBM tumor progression and underwent right frontotemporal craniotomy for resection of tumor on  per Neurosurgery. Patient had an abnormal EEG on . Patient was d/c on . On  patient was experiencing progressive weakness with neurological changes and returned to the hospital. Patient was demonstrating neurological changes, and a consult was placed to Neurology with a pending MRI Brain. On  MRI Brain was significant for Acute infarct with associated FLAIR signal abnormality in the right basal ganglia and corona radiata and acute CVA was confirmed per Neurology.   Past Medical History:  has a past medical history of Arthritis, GBM (glioblastoma multiforme) (HCC), HTN (hypertension), Kidney stones, Osteoporosis, and Seizures (HCC).  Past Surgical History:  has a past surgical history that includes brain surgery (Right, 2023); Eye surgery (Left); and craniotomy (Right, 2024).  Discharge Recommendations: To be determined based on pt's progress  DME Required For Discharge: DME to be determined pending patient progress  Precautions/Restrictions: no restrictions  Weight Bearing Restrictions: no restrictions  [] Right Upper Extremity  [] Left Upper Extremity []

## 2024-10-09 NOTE — PLAN OF CARE
Problem: Discharge Planning  Goal: Discharge to home or other facility with appropriate resources  Outcome: Progressing  Flowsheets (Taken 10/8/2024 2245 by Donnie You, RN)  Discharge to home or other facility with appropriate resources: Identify barriers to discharge with patient and caregiver     Problem: Chronic Conditions and Co-morbidities  Goal: Patient's chronic conditions and co-morbidity symptoms are monitored and maintained or improved  Outcome: Progressing  Flowsheets (Taken 10/8/2024 2245 by Donnie You, RN)  Care Plan - Patient's Chronic Conditions and Co-Morbidity Symptoms are Monitored and Maintained or Improved: Monitor and assess patient's chronic conditions and comorbid symptoms for stability, deterioration, or improvement     Problem: Safety - Adult  Goal: Free from fall injury  Outcome: Progressing     Problem: Pain  Goal: Verbalizes/displays adequate comfort level or baseline comfort level  Outcome: Progressing     Problem: Skin/Tissue Integrity  Goal: Absence of new skin breakdown  Description: 1.  Monitor for areas of redness and/or skin breakdown  2.  Assess vascular access sites hourly  3.  Every 4-6 hours minimum:  Change oxygen saturation probe site  4.  Every 4-6 hours:  If on nasal continuous positive airway pressure, respiratory therapy assess nares and determine need for appliance change or resting period.  Outcome: Progressing     Problem: ABCDS Injury Assessment  Goal: Absence of physical injury  Outcome: Progressing

## 2024-10-09 NOTE — PROGRESS NOTES
Right head incision cleansed with soap and water per order. Staples (20 staples) removed from right head incision per order. Patient tolerated well. Incision well approximated, no complications.

## 2024-10-09 NOTE — PROGRESS NOTES
Nutrition Note    RECOMMENDATIONS  PO Diet: Regular  ONS: Ensure clear @ dinner daily     NUTRITION ASSESSMENT   Nutrition intervention triggered for f/u.  Pt receives a regular diet & reports appetite is good & is eating more than 50% of meals.  No longer wants Ensure clear TID, & has a stock pile of unopened cartons @ bedside.  WIll send once per day & allow pt to drink items accumulated in room before re-ordering ONS.  No new wt to review for changes.  Pt is at low risk for nutrition compromise.     Nutrition Related Findings: LBM 10/7; trace edema RLE, gluc 100; staples removed from head 10/8.  Wounds: Surgical Incision  Nutrition Education:  Education not indicated   Nutrition Goals: PO intake 75% or greater     MALNUTRITION ASSESSMENT   Chronic Illness  Malnutrition Status: At risk for malnutrition (Comment)    NUTRITION DIAGNOSIS   Increased nutrient needs related to increase demand for energy/nutrients as evidenced by other (comment) (increased protein needs s/p surgery)      CURRENT NUTRITION THERAPIES  ADULT DIET; Regular  ADULT ORAL NUTRITION SUPPLEMENT; Dinner; Clear Liquid Oral Supplement     PO Intake: 51-75%   PO Supplement Intake:1-25%, 51-75%    ANTHROPOMETRICS  Current Height: 152.4 cm (5')  Current Weight - Scale: 52.6 kg (116 lb)    Ideal Body Weight (IBW): 100 lbs  (45 kg)    Usual Bodyweight 61.2 kg (135 lb)       BMI: 22.7      COMPARATIVE STANDARDS  Total Energy Requirements (kcals/day): 1362     Protein (g):  63- 105g       Fluid (mL/day):  1 ml/kcal    The patient will be monitored per nutrition standards of care. Consult dietitian if additional nutrition interventions are needed prior to RD reassessment.     Gail Marquez RD, LD    Contact: [phone]

## 2024-10-10 LAB
ANION GAP SERPL CALCULATED.3IONS-SCNC: 11 MMOL/L (ref 3–16)
BASOPHILS # BLD: 0 K/UL (ref 0–0.2)
BASOPHILS NFR BLD: 0.3 %
BUN SERPL-MCNC: 31 MG/DL (ref 7–20)
CALCIUM SERPL-MCNC: 8.8 MG/DL (ref 8.3–10.6)
CHLORIDE SERPL-SCNC: 102 MMOL/L (ref 99–110)
CO2 SERPL-SCNC: 23 MMOL/L (ref 21–32)
CREAT SERPL-MCNC: 0.7 MG/DL (ref 0.6–1.2)
DEPRECATED RDW RBC AUTO: 14.5 % (ref 12.4–15.4)
EOSINOPHIL # BLD: 0.1 K/UL (ref 0–0.6)
EOSINOPHIL NFR BLD: 1.1 %
GFR SERPLBLD CREATININE-BSD FMLA CKD-EPI: >90 ML/MIN/{1.73_M2}
GLUCOSE SERPL-MCNC: 87 MG/DL (ref 70–99)
HCT VFR BLD AUTO: 35.1 % (ref 36–48)
HGB BLD-MCNC: 11.8 G/DL (ref 12–16)
LYMPHOCYTES # BLD: 0.9 K/UL (ref 1–5.1)
LYMPHOCYTES NFR BLD: 10.7 %
MCH RBC QN AUTO: 34.3 PG (ref 26–34)
MCHC RBC AUTO-ENTMCNC: 33.5 G/DL (ref 31–36)
MCV RBC AUTO: 102.2 FL (ref 80–100)
MONOCYTES # BLD: 1.2 K/UL (ref 0–1.3)
MONOCYTES NFR BLD: 14.4 %
NEUTROPHILS # BLD: 5.9 K/UL (ref 1.7–7.7)
NEUTROPHILS NFR BLD: 73.5 %
PLATELET # BLD AUTO: 179 K/UL (ref 135–450)
PMV BLD AUTO: 7.9 FL (ref 5–10.5)
POTASSIUM SERPL-SCNC: 4.8 MMOL/L (ref 3.5–5.1)
RBC # BLD AUTO: 3.44 M/UL (ref 4–5.2)
SODIUM SERPL-SCNC: 136 MMOL/L (ref 136–145)
WBC # BLD AUTO: 8.1 K/UL (ref 4–11)

## 2024-10-10 PROCEDURE — 6370000000 HC RX 637 (ALT 250 FOR IP): Performed by: STUDENT IN AN ORGANIZED HEALTH CARE EDUCATION/TRAINING PROGRAM

## 2024-10-10 PROCEDURE — 97129 THER IVNTJ 1ST 15 MIN: CPT

## 2024-10-10 PROCEDURE — 6360000002 HC RX W HCPCS: Performed by: STUDENT IN AN ORGANIZED HEALTH CARE EDUCATION/TRAINING PROGRAM

## 2024-10-10 PROCEDURE — 1280000000 HC REHAB R&B

## 2024-10-10 PROCEDURE — 97530 THERAPEUTIC ACTIVITIES: CPT

## 2024-10-10 PROCEDURE — 92526 ORAL FUNCTION THERAPY: CPT

## 2024-10-10 PROCEDURE — 80048 BASIC METABOLIC PNL TOTAL CA: CPT

## 2024-10-10 PROCEDURE — 97535 SELF CARE MNGMENT TRAINING: CPT

## 2024-10-10 PROCEDURE — 85025 COMPLETE CBC W/AUTO DIFF WBC: CPT

## 2024-10-10 PROCEDURE — 36415 COLL VENOUS BLD VENIPUNCTURE: CPT

## 2024-10-10 PROCEDURE — 97130 THER IVNTJ EA ADDL 15 MIN: CPT

## 2024-10-10 RX ORDER — BISACODYL 10 MG
10 SUPPOSITORY, RECTAL RECTAL DAILY PRN
Status: DISCONTINUED | OUTPATIENT
Start: 2024-10-10 | End: 2024-10-12 | Stop reason: HOSPADM

## 2024-10-10 RX ADMIN — ACETAMINOPHEN 650 MG: 325 TABLET ORAL at 16:47

## 2024-10-10 RX ADMIN — BISACODYL 10 MG: 10 SUPPOSITORY RECTAL at 19:38

## 2024-10-10 RX ADMIN — LEVETIRACETAM 500 MG: 100 SOLUTION ORAL at 23:07

## 2024-10-10 RX ADMIN — LACOSAMIDE 200 MG: 100 TABLET, FILM COATED ORAL at 08:57

## 2024-10-10 RX ADMIN — ACETAMINOPHEN 650 MG: 325 TABLET ORAL at 06:58

## 2024-10-10 RX ADMIN — HEPARIN SODIUM 5000 UNITS: 5000 INJECTION INTRAVENOUS; SUBCUTANEOUS at 14:30

## 2024-10-10 RX ADMIN — STANDARDIZED SENNA CONCENTRATE AND DOCUSATE SODIUM 2 TABLET: 8.6; 5 TABLET ORAL at 08:57

## 2024-10-10 RX ADMIN — SERTRALINE 25 MG: 50 TABLET, FILM COATED ORAL at 14:32

## 2024-10-10 RX ADMIN — ONDANSETRON 4 MG: 4 TABLET, ORALLY DISINTEGRATING ORAL at 14:53

## 2024-10-10 RX ADMIN — HEPARIN SODIUM 5000 UNITS: 5000 INJECTION INTRAVENOUS; SUBCUTANEOUS at 07:00

## 2024-10-10 RX ADMIN — LEVETIRACETAM 500 MG: 100 SOLUTION ORAL at 08:57

## 2024-10-10 RX ADMIN — ATORVASTATIN CALCIUM 40 MG: 40 TABLET, FILM COATED ORAL at 23:07

## 2024-10-10 RX ADMIN — HEPARIN SODIUM 5000 UNITS: 5000 INJECTION INTRAVENOUS; SUBCUTANEOUS at 23:07

## 2024-10-10 RX ADMIN — ASPIRIN 81 MG: 81 TABLET, COATED ORAL at 08:57

## 2024-10-10 RX ADMIN — LACOSAMIDE 200 MG: 100 TABLET, FILM COATED ORAL at 23:07

## 2024-10-10 ASSESSMENT — PAIN SCALES - WONG BAKER
WONGBAKER_NUMERICALRESPONSE: NO HURT

## 2024-10-10 ASSESSMENT — PAIN SCALES - GENERAL
PAINLEVEL_OUTOF10: 0
PAINLEVEL_OUTOF10: 3
PAINLEVEL_OUTOF10: 0
PAINLEVEL_OUTOF10: 5
PAINLEVEL_OUTOF10: 0
PAINLEVEL_OUTOF10: 2

## 2024-10-10 ASSESSMENT — PAIN DESCRIPTION - DESCRIPTORS
DESCRIPTORS: ACHING
DESCRIPTORS: ACHING

## 2024-10-10 ASSESSMENT — PAIN DESCRIPTION - ORIENTATION
ORIENTATION: DISTAL
ORIENTATION: MID

## 2024-10-10 ASSESSMENT — PAIN DESCRIPTION - LOCATION
LOCATION: HEAD
LOCATION: HEAD

## 2024-10-10 ASSESSMENT — PAIN - FUNCTIONAL ASSESSMENT: PAIN_FUNCTIONAL_ASSESSMENT: ACTIVITIES ARE NOT PREVENTED

## 2024-10-10 NOTE — PROGRESS NOTES
[facility] - Inpatient Rehabilitation Department   Phone: [phone]    Occupational Therapy    [] Initial Evaluation            [x] Daily Treatment Note         [] Discharge Summary      Patient: Rita Dixon   : 1955   MRN: [id number]   Date of Service:  10/10/2024    Admitting Diagnosis:  Acute cerebrovascular accident (CVA) due to ischemia (HCC)  Current Admission Summary:   Rita Dixon is a 68 y.o. female with PMHx notable for hypertension, GBM s/p multiple resection (, 24), focal epilepsy who presented to [facility] on 24 with left-sided weakness.  Following her recent craniotomy for resection of right anterolateral temporal lobe GBM she developed generalized tonic-clonic seizure, with some left-sided weakness.  Weakness improved and patient was able to return home on , however she returned on  with worsening left-sided weakness.  MRI brain confirmed acute infarct involving the right basal ganglia and corona radiata.  Neurology was consulted, recommended permissive hypertension noting a pressure dependent exam, so home blood pressure medications were held.  She was continued on her home antiepileptics, as well as a dexamethasone taper per neurosurgery.     Patient reports that she is doing well today.  She had a mild right-sided headache which responded well to Tylenol.  Reports inability to move her left arm or leg.  Reports some diminished visual acuity that is chronic, no acute vision changes.  Having some difficulty swallowing pills this morning.  Denies any chest pain, dyspnea, abdominal pain  Past Medical History:  has a past medical history of Arthritis, GBM (glioblastoma multiforme) (HCC), HTN (hypertension), Kidney stones, Osteoporosis, and Seizures (HCC).  Past Surgical History:  has a past surgical history that includes brain surgery (Right, 2023); Eye surgery (Left); and craniotomy (Right, 2024).    Discharge Recommendations: Pending

## 2024-10-10 NOTE — PROGRESS NOTES
[facility] - Inpatient Rehabilitation Department   Phone: [phone]    Physical Therapy    [] Initial Evaluation            [x] Daily Treatment Note         [] Discharge Summary      Patient: Rita Dixon   : 1955   MRN: [id number]   Date of Service:  10/10/2024  Admitting Diagnosis: Acute cerebrovascular accident (CVA) due to ischemia (HCC)  Current Admission Summary: Patient is a 68-year-old female who presented to the ED of an OSH on  for complaint of recurring weakness of her left hemibody. Patient has a PMHx: arthritis, GBM, HTN, Kidney Stone, Osteoporosis, and Seizures and a PSHx: Colonoscopy, Craniotomy, Eyes surgery, T&A and [place] Tooth extraction. Patient was recently discharged from hospital  to GBM tumor progression and underwent right frontotemporal craniotomy for resection of tumor on  per Neurosurgery. Patient had an abnormal EEG on . Patient was d/c on . On  patient was experiencing progressive weakness with neurological changes and returned to the hospital. Patient was demonstrating neurological changes, and a consult was placed to Neurology with a pending MRI Brain. On  MRI Brain was significant for Acute infarct with associated FLAIR signal abnormality in the right basal ganglia and corona radiata and acute CVA was confirmed per Neurology.   Past Medical History:  has a past medical history of Arthritis, GBM (glioblastoma multiforme) (HCC), HTN (hypertension), Kidney stones, Osteoporosis, and Seizures (HCC).  Past Surgical History:  has a past surgical history that includes brain surgery (Right, 2023); Eye surgery (Left); and craniotomy (Right, 2024).  Discharge Recommendations: To be determined based on pt's progress  DME Required For Discharge: DME to be determined pending patient progress  Precautions/Restrictions: no restrictions  Weight Bearing Restrictions: no restrictions  [] Right Upper Extremity  [] Left Upper Extremity []  place, call light within reach, and gait belt    Plan  Frequency: 5 x/week, 60 min/day  Current Treatment Recommendations: strengthening, ROM, balance training, functional mobility training, transfer training, endurance training, and neuromuscular re-education    Goals  Patient Goals: Pt wants to return home   Short Term Goals:  Time Frame: 2-3 weeks  Patient will complete bed mobility at minimal assistance   Patient will complete transfers at moderate assistance   Patient will ambulate 15 ft with use of LRAD at maximum assistance  Patient will complete manual w/c propulsion 100 ft at modified independent  Patient will demonstrate static sitting balance at minimal assistance     Above goals reviewed on 10/10/2024.  All goals are ongoing at this time unless indicated above.      Therapy Session Time      Individual Group Co-treatment   Time In     0730   Time Out     0830   Minutes     60         Timed Code Treatment Minutes:  60  Total Treatment Minutes: 60       Electronically Signed By: Akin Aragon SPT  Therapist was present, directed the patient's care, made skilled judgement, and was responsible for assessment and treatment of the patient.  Co-signed and supervised by: Franki Tracey, MARIA DEL ROSARIO [id number]

## 2024-10-10 NOTE — PLAN OF CARE
Problem: Discharge Planning  Goal: Discharge to home or other facility with appropriate resources  Outcome: Progressing  Flowsheets (Taken 10/9/2024 2115 by Donnie You, RN)  Discharge to home or other facility with appropriate resources: Identify barriers to discharge with patient and caregiver     Problem: Chronic Conditions and Co-morbidities  Goal: Patient's chronic conditions and co-morbidity symptoms are monitored and maintained or improved  Outcome: Progressing  Flowsheets (Taken 10/9/2024 2115 by Donnie You, RN)  Care Plan - Patient's Chronic Conditions and Co-Morbidity Symptoms are Monitored and Maintained or Improved: Monitor and assess patient's chronic conditions and comorbid symptoms for stability, deterioration, or improvement     Problem: Safety - Adult  Goal: Free from fall injury  Outcome: Progressing     Problem: Pain  Goal: Verbalizes/displays adequate comfort level or baseline comfort level  Outcome: Progressing     Problem: Skin/Tissue Integrity  Goal: Absence of new skin breakdown  Description: 1.  Monitor for areas of redness and/or skin breakdown  2.  Assess vascular access sites hourly  3.  Every 4-6 hours minimum:  Change oxygen saturation probe site  4.  Every 4-6 hours:  If on nasal continuous positive airway pressure, respiratory therapy assess nares and determine need for appliance change or resting period.  Outcome: Progressing     Problem: ABCDS Injury Assessment  Goal: Absence of physical injury  Outcome: Progressing     Problem: Nutrition Deficit:  Goal: Optimize nutritional status  Outcome: Progressing

## 2024-10-10 NOTE — PROGRESS NOTES
Pt had an episode of emesis. Vomitted all the food that she ate. Pt states that it is the food that she ate during lunch that triggered the vomit. Will monitor.  PRN zofran given.     Addendum (1658): pt complain of headache, prn Tylenol given. pt felt cold and don't feel as usual and requested to check her vitals. /78, Temp 99.1. pt don't feel nauseated at this time. Offered suppository, pt stated that she wants to have it later after dinner. Will apply the suppository later today. Will monitor.    1815: pt/family refused this RN to give her suppository. Pt also refused to check her and make sure she is dry at this time. Will check her later.     1938: pt agreeable to have suppository in at this time. It's placed in place.

## 2024-10-10 NOTE — PROGRESS NOTES
Rita Dixon  10/10/2024  [id number]    Chief Complaint: Acute cerebrovascular accident (CVA) due to ischemia (HCC)    Subjective    No acute events overnight.    Doing well today overall. Slept a little better last night. Appetite is fair. Still having right retroorbital headache pain. Stable left hemiparesis, no other new neurological symptoms.     Last BM (including prior to admit):  (smear this am with therapy), staff concerned with fecal impaction.           Objective    Patient Vitals for the past 24 hrs:   BP Temp Temp src Pulse Resp SpO2   10/10/24 0845 112/70 98 °F (36.7 °C) Oral 87 16 100 %   10/09/24 2200 -- -- -- -- 16 --   10/09/24 2130 -- -- -- -- 16 --   10/09/24 2115 (!) 146/79 98.4 °F (36.9 °C) Oral 87 16 97 %     Gen: No distress, pleasant.   HEENT: Right temporal craniotomy incision is closed with staples, well healed without erythema or drainage.   CV: Regular rate and rhythm. Extremities warm, well perfused.   Resp: No respiratory distress. CTAB.  Abd: Soft, nontender.  Ext: No edema.   Neuro: Alert, oriented, appropriately interactive. Flaccid left hemiparesis, and left inattention.     Laboratory data: Available via EMR.     Therapy progress:       PT    Supine to Sit: Substantial/maximal assistance  Sit to Supine: Dependent   Sit to Stand: Substantial/maximal assistance  Chair/Bed to Chair Transfer: Substantial/maximal assistance  Car Transfer:    Ambulation 10 ft:    Ambulation 50 ft:    Ambulation 150 ft:    Stairs - 1 Step:    Stairs - 4 Step:    Stairs - 12 Step:      OT    Eating: Setup or clean-up assistance  Oral Hygiene: Supervision or touching assistance  Bathing: Dependent  Upper Body Dressing: Substantial/maximal assistance  Lower Body Dressing: Dependent  Toilet Transfer: Dependent  Toilet Hygiene: Dependent    Speech Therapy    Pt presents with moderate cognitive-linguistic deficits within the domains of immediate and short-term memory recall, attention, problem solving and  executive function. Pt is tangential in conversation with impaired thought organization however demonstrates functional expression of wants/needs and ability to follow commands/answer simple/complex questions. She demonstrates inattention to L side and requires min verbal/tactile cues to direct attention. Pt continues to demonstrate decreased insight into deficits/general problem solving, requiring consistent cues and education to promote attention to left side and education to complete simple problem solving tasks. Pt is currently tolerating a regular texture diet with thin liquids although L perioral weakness impacts mastication, AP transit and timely clearing. Pt's  manages medications and finances however pt was completing simple cooking/cleaning around the house and maintained more cognitive independence prior to hospitalization. Recommend SLP intervention to maximize return to prior level of function upon discharge.    Body mass index is 22.65 kg/m².        Assessment/Plan:  Functional progress: Dependent assistance for toileting.  This patient continues to require an ARU level of care from all disciplines to address the following issues:    #. Acute right basal ganglia and corona radiata ischemic stroke  #. R temporal GBM s/p resection 12/29/23 and 9/24/24  #. Focal epilepsy  - Continue ASA 81 mg daily, atorvastatin 40 mg nightly  - Allow permissive hypertension, SBP goal 160 to 180 mmHg  - s/p dexamethasone taper  - Staples, sutures removed.   - Continue Keppra 500 mg twice daily (converted from 1000 mg extended release to short-acting oral liquid due to trouble with pill swallowing), lacosamide 200 mg twice daily  - Headaches: Encourage adequate fluid hydration, Tylenol 650 mg every 4 hours PRN, oxycodone 5-10 mg every 4 hours PRN for moderate-severe. Stop TCA, only helping minimally and want to minimize polypharmacy when starting SSRI.  - BMP, CBC stable on 10/7     #. HTN  #. Bradycardia  -

## 2024-10-10 NOTE — PROGRESS NOTES
Referral from Therapy for patient support/visit.  Patient and  request follow up at later time as pt having 'physically rough' day today.  Will follow up as requested.

## 2024-10-10 NOTE — PROGRESS NOTES
[facility] - Inpatient Rehabilitation Department   Phone: [phone]    Speech Therapy    [] Initial Evaluation            [x] Daily Treatment Note         [] Discharge Summary      Patient: Rita Dixon   : 1955   MRN: [id number]   Date of Service:  10/10/2024  Admitting Diagnosis: Acute cerebrovascular accident (CVA) due to ischemia (HCC)  Current Admission Summary: See rehab MD note  Past Medical History:  has a past medical history of Arthritis, GBM (glioblastoma multiforme) (HCC), HTN (hypertension), Kidney stones, Osteoporosis, and Seizures (HCC).  Past Surgical History:  has a past surgical history that includes brain surgery (Right, 2023); Eye surgery (Left); and craniotomy (Right, 2024).  Recent MRI Brain: 24  1. Acute infarct with associated FLAIR signal abnormality in the right basal ganglia and corona radiata. No significant midline shift or evidence of hemorrhagic conversion.   2. Mild diffusion restriction in the right hippocampus may represent sequela of seizures or ischemia.   3. Postoperative changes of right anterior temporal mass resection. Punctate foci of enhancement at the resection site are favored postsurgical, with attention on follow-up recommended.   4. Unchanged trace intraventricular hemorrhage with unchanged ventricular size and configuration.   Instrumental Swallow Study: 24  Patient presents with grossly normal oropharyngeal swallow function with observed flash laryngeal penetration of thin liquids and puree intermittently which fully clears during the swallow. There is no significant post-swallow residue in the pharynx. Mastication is timely and effective, with adequate oral clearance. Swallow safety and swallow efficiency are grossly WFL.   Precautions/Restrictions: high fall risk        Pre-Admission Information   Living Status: Pt lives at home with  who manages medications and finances. Pt previously assisted with simple  overt s/s of aspiration.   Patient will complete functional problem-solving tasks for daily situations with 80% accuracy or given min cues.  Pt will complete word associative tasks to improve mental flexibility, complex thinking, and working memory skills with 80% accuracy.    Patient will be instructed in memory strategies to utilize in order to promote recall of newly learned information with >80% min cues.  Pt will improve visual perception/L hemibody awareness for functional task completion via graded tasks to mild cues  Pt will attend to current task with < 3 verbal redirections.    Above goals reviewed on 10/10/2024. All goals are ongoing at this time unless indicated above.       Therapy Session Time      Session 1 Session 2   Time In 0915 1039, 1347    Time Out 0945  1100, 1406   Time Code Minutes 30 21 + 10   Individual Minutes 30  21 + 19      Timed Code Treatment Minutes: 61  Total Treatment Minutes: 70     Electronically Signed By:  KEIKO Marie  Clinician   Speech-Language Pathology     Speech Language Pathologist observed and directed the patient's plan of care. Co-signed and supervised by:     Mckenzie Maxwell M.A. CCC-SLP #[id number] 10/10/2024 10:28 AM  Speech-Language Pathologist

## 2024-10-11 PROCEDURE — 97530 THERAPEUTIC ACTIVITIES: CPT

## 2024-10-11 PROCEDURE — 6370000000 HC RX 637 (ALT 250 FOR IP): Performed by: STUDENT IN AN ORGANIZED HEALTH CARE EDUCATION/TRAINING PROGRAM

## 2024-10-11 PROCEDURE — 6360000002 HC RX W HCPCS: Performed by: STUDENT IN AN ORGANIZED HEALTH CARE EDUCATION/TRAINING PROGRAM

## 2024-10-11 PROCEDURE — 97130 THER IVNTJ EA ADDL 15 MIN: CPT

## 2024-10-11 PROCEDURE — 97535 SELF CARE MNGMENT TRAINING: CPT

## 2024-10-11 PROCEDURE — 97112 NEUROMUSCULAR REEDUCATION: CPT

## 2024-10-11 PROCEDURE — 1280000000 HC REHAB R&B

## 2024-10-11 PROCEDURE — 97116 GAIT TRAINING THERAPY: CPT

## 2024-10-11 PROCEDURE — 51701 INSERT BLADDER CATHETER: CPT

## 2024-10-11 PROCEDURE — 51798 US URINE CAPACITY MEASURE: CPT

## 2024-10-11 PROCEDURE — 97129 THER IVNTJ 1ST 15 MIN: CPT

## 2024-10-11 RX ADMIN — ASPIRIN 81 MG: 81 TABLET, COATED ORAL at 08:50

## 2024-10-11 RX ADMIN — LEVETIRACETAM 500 MG: 100 SOLUTION ORAL at 21:28

## 2024-10-11 RX ADMIN — ATORVASTATIN CALCIUM 40 MG: 40 TABLET, FILM COATED ORAL at 21:28

## 2024-10-11 RX ADMIN — LACOSAMIDE 200 MG: 100 TABLET, FILM COATED ORAL at 08:50

## 2024-10-11 RX ADMIN — LACOSAMIDE 200 MG: 100 TABLET, FILM COATED ORAL at 21:28

## 2024-10-11 RX ADMIN — HEPARIN SODIUM 5000 UNITS: 5000 INJECTION INTRAVENOUS; SUBCUTANEOUS at 15:03

## 2024-10-11 RX ADMIN — HEPARIN SODIUM 5000 UNITS: 5000 INJECTION INTRAVENOUS; SUBCUTANEOUS at 21:28

## 2024-10-11 RX ADMIN — LEVETIRACETAM 500 MG: 100 SOLUTION ORAL at 08:50

## 2024-10-11 RX ADMIN — HEPARIN SODIUM 5000 UNITS: 5000 INJECTION INTRAVENOUS; SUBCUTANEOUS at 06:39

## 2024-10-11 RX ADMIN — SERTRALINE 25 MG: 50 TABLET, FILM COATED ORAL at 08:50

## 2024-10-11 ASSESSMENT — PAIN SCALES - WONG BAKER
WONGBAKER_NUMERICALRESPONSE: NO HURT
WONGBAKER_NUMERICALRESPONSE: NO HURT

## 2024-10-11 NOTE — PROGRESS NOTES
Assessment complete. Alert, oriented x4. Denies pain. Denies need for PRN sleep aid. Reports emesis during day shift; patient unsure what triggered episode; denies nausea at this time. Passed medium to large amount of hard/formed stool balls per bedpan after receiving PRN Dulcolax suppository at the start of this shift. The care plan and education has been reviewed and mutually agreed upon with the patient. In bed, alarm on, bed in lowest position, call light and table within reach. No further needs expressed at this time.

## 2024-10-11 NOTE — PLAN OF CARE
Problem: Discharge Planning  Goal: Discharge to home or other facility with appropriate resources  Outcome: Progressing  Flowsheets (Taken 10/10/2024 2304 by Rita Ramírez, RN)  Discharge to home or other facility with appropriate resources: Identify discharge learning needs (meds, wound care, etc)     Problem: Chronic Conditions and Co-morbidities  Goal: Patient's chronic conditions and co-morbidity symptoms are monitored and maintained or improved  10/11/2024 1020 by Abe Davis RN  Outcome: Progressing     Problem: Safety - Adult  Goal: Free from fall injury  10/11/2024 1020 by Abe Davis RN  Outcome: Progressing     Problem: Pain  Goal: Verbalizes/displays adequate comfort level or baseline comfort level  10/11/2024 1020 by Abe Davis RN  Outcome: Progressing     Problem: Skin/Tissue Integrity  Goal: Absence of new skin breakdown  Description: 1.  Monitor for areas of redness and/or skin breakdown  2.  Assess vascular access sites hourly  3.  Every 4-6 hours minimum:  Change oxygen saturation probe site  4.  Every 4-6 hours:  If on nasal continuous positive airway pressure, respiratory therapy assess nares and determine need for appliance change or resting period.  10/11/2024 1020 by Abe Davis RN  Outcome: Progressing     Problem: ABCDS Injury Assessment  Goal: Absence of physical injury  10/11/2024 1020 by Abe Davis RN  Outcome: Progressing     Problem: Nutrition Deficit:  Goal: Optimize nutritional status  10/11/2024 1020 by Abe Davis RN  Outcome: Progressing

## 2024-10-11 NOTE — PROGRESS NOTES
Pt comfortably resting in chair. VSS. Denies any pain at this moment. Morning meds given per MAR. PWWW. AXOX4. Incision site CDI, VICTOR MANUEL, no staples, no sutures. Call light and bedside table in reach. Chair locked and alarm on. The care plan and education has been reviewed and mutually agreed upon with the patient.

## 2024-10-11 NOTE — PROGRESS NOTES
[facility] - Inpatient Rehabilitation Department   Phone: [phone]    Physical Therapy    [] Initial Evaluation            [x] Daily Treatment Note         [] Discharge Summary      Patient: Rita Dixon   : 1955   MRN: [id number]   Date of Service:  10/11/2024  Admitting Diagnosis: Acute cerebrovascular accident (CVA) due to ischemia (HCC)  Current Admission Summary: Patient is a 68-year-old female who presented to the ED of an OSH on  for complaint of recurring weakness of her left hemibody. Patient has a PMHx: arthritis, GBM, HTN, Kidney Stone, Osteoporosis, and Seizures and a PSHx: Colonoscopy, Craniotomy, Eyes surgery, T&A and [place] Tooth extraction. Patient was recently discharged from hospital  to GBM tumor progression and underwent right frontotemporal craniotomy for resection of tumor on  per Neurosurgery. Patient had an abnormal EEG on . Patient was d/c on . On  patient was experiencing progressive weakness with neurological changes and returned to the hospital. Patient was demonstrating neurological changes, and a consult was placed to Neurology with a pending MRI Brain. On  MRI Brain was significant for Acute infarct with associated FLAIR signal abnormality in the right basal ganglia and corona radiata and acute CVA was confirmed per Neurology.   Past Medical History:  has a past medical history of Arthritis, GBM (glioblastoma multiforme) (HCC), HTN (hypertension), Kidney stones, Osteoporosis, and Seizures (HCC).  Past Surgical History:  has a past surgical history that includes brain surgery (Right, 2023); Eye surgery (Left); and craniotomy (Right, 2024).  Discharge Recommendations: To be determined based on pt's progress  DME Required For Discharge: DME to be determined pending patient progress  Precautions/Restrictions: no restrictions  Weight Bearing Restrictions: no restrictions  [] Right Upper Extremity  [] Left Upper Extremity []  plan of care, general safety, energy conservation, family education, pressure relief  Learning Assessment:  patient verbalizes and demonstrates understanding    Assessment  Activity Tolerance: Pt is limited with decreased endurance and hypotonicity within L LE and UE  Impairments Requiring Therapeutic Intervention: decreased functional mobility, decreased ADL status, decreased ROM, decreased strength, decreased endurance, decreased balance, decreased IADL, decreased posture  Prognosis: fair  Clinical Assessment: Patient's mobility continues to be limited by (L) sided flaccidity, inattention and poor awareness to midline requiring constant cueing and increased assistance for all mobility.  Still requires assist of 2 people for functional mobility.  She continues to benefit from ongoing skilled therapy services to maximize her functional independence with all mobility.  Safety Interventions: patient left in chair, chair alarm in place, call light within reach, and gait belt     Plan  Frequency: 5 x/week, 60 min/day  Current Treatment Recommendations: strengthening, ROM, balance training, functional mobility training, transfer training, endurance training, and neuromuscular re-education    Goals  Patient Goals: Pt wants to return home   Short Term Goals:  Time Frame: 2-3 weeks  Patient will complete bed mobility at minimal assistance   Patient will complete transfers at moderate assistance   Patient will ambulate 15 ft with use of LRAD at maximum assistance  Patient will complete manual w/c propulsion 100 ft at modified independent  Patient will demonstrate static sitting balance at minimal assistance     Above goals reviewed on 10/11/2024.  All goals are ongoing at this time unless indicated above.      Therapy Session Time      Individual Group Co-treatment   Time In    0730   Time Out    0823   Minutes    53     Second Session Therapy Time:   Individual Concurrent Group Co-treatment   Time In       1400   Time Out

## 2024-10-11 NOTE — FLOWSHEET NOTE
10/11/24 3447   Urine Assessment   Bladder Scan Volume (mL) 417 mL   $ Bladder scan $ Yes     Straight cath second time and 700 mL of urine output was noted.

## 2024-10-11 NOTE — PROGRESS NOTES
[facility] - Inpatient Rehabilitation Department   Phone: [phone]    Occupational Therapy    [] Initial Evaluation            [x] Daily Treatment Note         [] Discharge Summary      Patient: Rita Dixon   : 1955   MRN: [id number]   Date of Service:  10/11/2024    Admitting Diagnosis:  Acute cerebrovascular accident (CVA) due to ischemia (HCC)  Current Admission Summary:   Rita Dixon is a 68 y.o. female with PMHx notable for hypertension, GBM s/p multiple resection (, 24), focal epilepsy who presented to [facility] on 24 with left-sided weakness.  Following her recent craniotomy for resection of right anterolateral temporal lobe GBM she developed generalized tonic-clonic seizure, with some left-sided weakness.  Weakness improved and patient was able to return home on , however she returned on  with worsening left-sided weakness.  MRI brain confirmed acute infarct involving the right basal ganglia and corona radiata.  Neurology was consulted, recommended permissive hypertension noting a pressure dependent exam, so home blood pressure medications were held.  She was continued on her home antiepileptics, as well as a dexamethasone taper per neurosurgery.     Patient reports that she is doing well today.  She had a mild right-sided headache which responded well to Tylenol.  Reports inability to move her left arm or leg.  Reports some diminished visual acuity that is chronic, no acute vision changes.  Having some difficulty swallowing pills this morning.  Denies any chest pain, dyspnea, abdominal pain  Past Medical History:  has a past medical history of Arthritis, GBM (glioblastoma multiforme) (HCC), HTN (hypertension), Kidney stones, Osteoporosis, and Seizures (HCC).  Past Surgical History:  has a past surgical history that includes brain surgery (Right, 2023); Eye surgery (Left); and craniotomy (Right, 2024).    Discharge Recommendations: Pending

## 2024-10-11 NOTE — PROGRESS NOTES
[facility] - Inpatient Rehabilitation Department   Phone: [phone]    Speech Therapy    [] Initial Evaluation            [x] Daily Treatment Note         [] Discharge Summary      Patient: Rita Dixon   : 1955   MRN: [id number]   Date of Service:  10/11/2024  Admitting Diagnosis: Acute cerebrovascular accident (CVA) due to ischemia (HCC)  Current Admission Summary: See rehab MD note  Past Medical History:  has a past medical history of Arthritis, GBM (glioblastoma multiforme) (HCC), HTN (hypertension), Kidney stones, Osteoporosis, and Seizures (HCC).  Past Surgical History:  has a past surgical history that includes brain surgery (Right, 2023); Eye surgery (Left); and craniotomy (Right, 2024).  Recent MRI Brain: 24  1. Acute infarct with associated FLAIR signal abnormality in the right basal ganglia and corona radiata. No significant midline shift or evidence of hemorrhagic conversion.   2. Mild diffusion restriction in the right hippocampus may represent sequela of seizures or ischemia.   3. Postoperative changes of right anterior temporal mass resection. Punctate foci of enhancement at the resection site are favored postsurgical, with attention on follow-up recommended.   4. Unchanged trace intraventricular hemorrhage with unchanged ventricular size and configuration.   Instrumental Swallow Study: 24  Patient presents with grossly normal oropharyngeal swallow function with observed flash laryngeal penetration of thin liquids and puree intermittently which fully clears during the swallow. There is no significant post-swallow residue in the pharynx. Mastication is timely and effective, with adequate oral clearance. Swallow safety and swallow efficiency are grossly WFL.   Precautions/Restrictions: high fall risk        Pre-Admission Information   Living Status: Pt lives at home with  who manages medications and finances. Pt previously assisted with simple

## 2024-10-11 NOTE — PLAN OF CARE
Problem: Chronic Conditions and Co-morbidities  Goal: Patient's chronic conditions and co-morbidity symptoms are monitored and maintained or improved  Outcome: Progressing  Flowsheets (Taken 10/10/2024 2304)  Care Plan - Patient's Chronic Conditions and Co-Morbidity Symptoms are Monitored and Maintained or Improved:   Monitor and assess patient's chronic conditions and comorbid symptoms for stability, deterioration, or improvement   Collaborate with multidisciplinary team to address chronic and comorbid conditions and prevent exacerbation or deterioration   Update acute care plan with appropriate goals if chronic or comorbid symptoms are exacerbated and prevent overall improvement and discharge     Problem: Safety - Adult  Goal: Free from fall injury  Outcome: Progressing     Problem: Pain  Goal: Verbalizes/displays adequate comfort level or baseline comfort level  Outcome: Progressing  Flowsheets (Taken 10/10/2024 2304)  Verbalizes/displays adequate comfort level or baseline comfort level: Encourage patient to monitor pain and request assistance     Problem: Skin/Tissue Integrity  Goal: Absence of new skin breakdown  Description: 1.  Monitor for areas of redness and/or skin breakdown  2.  Assess vascular access sites hourly  3.  Every 4-6 hours minimum:  Change oxygen saturation probe site  4.  Every 4-6 hours:  If on nasal continuous positive airway pressure, respiratory therapy assess nares and determine need for appliance change or resting period.  Outcome: Progressing     Problem: ABCDS Injury Assessment  Goal: Absence of physical injury  Outcome: Progressing  Flowsheets (Taken 10/11/2024 0134)  Absence of Physical Injury: Implement safety measures based on patient assessment     Problem: Nutrition Deficit:  Goal: Optimize nutritional status  Outcome: Progressing

## 2024-10-11 NOTE — FLOWSHEET NOTE
10/11/24 1630   Urine Assessment   Bladder Scan Volume (mL) 396 mL   $ Bladder scan $ Yes     Pt wants to try void on her own, purewick connected. Will monitor.

## 2024-10-11 NOTE — FLOWSHEET NOTE
10/11/24 1134   Urine Assessment   Bladder Scan Volume (mL) 831 mL   $ Bladder scan $ Yes     Pt required straight cath. 1025 mL of urine was pulled. Pt tolerated well. Will cotd to monitor.    1202: MD notified. Urology consult placed via phone call per order.

## 2024-10-11 NOTE — PROGRESS NOTES
Incision site feels puffy while assessing it. Dr. Cyn HELM Notified, Verbal order to keep the HOB over 30 degrees and monitor the pt overnight and notify the provider if it worsens. Dr. Cyn CISNEROS will round on the pt tomorrow and assess the site upon arrival per Dr. Cyn HELM Will monitor.    Pt denies any pain to the site, no drainage, site is CDI, HOB maintained at 45 degrees.

## 2024-10-11 NOTE — PROGRESS NOTES
Rita Dixon  10/11/2024  [id number]    Chief Complaint: Acute cerebrovascular accident (CVA) due to ischemia (HCC)    Subjective    Seen in her room this morning.  No new complaints overnight.  She is making progress in therapy and happy with her care so far.  Headache seems to be improved a little bit today.  Continues to have left-sided hemiparesis.     Last BM (including prior to admit): 10/11/24, staff concerned with fecal impaction.           Objective    Patient Vitals for the past 24 hrs:   BP Temp Temp src Pulse Resp SpO2   10/11/24 0845 135/74 98.2 °F (36.8 °C) Oral 91 18 100 %   10/10/24 2304 (!) 161/81 98.2 °F (36.8 °C) Oral 79 18 96 %   10/10/24 1645 (!) 151/78 99.1 °F (37.3 °C) Oral 82 16 98 %     Gen: No distress, pleasant.   HEENT: Right temporal craniotomy incision is closed with staples, well healed without erythema or drainage.   CV: Regular rate and rhythm. Extremities warm, well perfused.   Resp: No respiratory distress. CTAB.  Abd: Soft, nontender.  Ext: No edema.   Neuro: Alert, oriented, appropriately interactive. Flaccid left hemiparesis, and left inattention.     Laboratory data: Available via EMR.     Therapy progress:       PT    Supine to Sit: Substantial/maximal assistance  Sit to Supine: Dependent   Sit to Stand: Substantial/maximal assistance  Chair/Bed to Chair Transfer: Substantial/maximal assistance  Car Transfer:    Ambulation 10 ft:    Ambulation 50 ft:    Ambulation 150 ft:    Stairs - 1 Step:    Stairs - 4 Step:    Stairs - 12 Step:      OT    Eating: Setup or clean-up assistance  Oral Hygiene: Supervision or touching assistance  Bathing: Dependent  Upper Body Dressing: Substantial/maximal assistance  Lower Body Dressing: Dependent  Toilet Transfer: Dependent  Toilet Hygiene: Dependent    Speech Therapy    Pt presents with moderate cognitive-linguistic deficits within the domains of immediate and short-term memory recall, attention, problem solving and executive function. Pt

## 2024-10-12 ENCOUNTER — APPOINTMENT (OUTPATIENT)
Dept: CT IMAGING | Age: 69
DRG: 057 | End: 2024-10-12
Attending: STUDENT IN AN ORGANIZED HEALTH CARE EDUCATION/TRAINING PROGRAM
Payer: COMMERCIAL

## 2024-10-12 ENCOUNTER — HOSPITAL ENCOUNTER (INPATIENT)
Age: 69
LOS: 12 days | Discharge: HOSPICE | End: 2024-10-24
Attending: INTERNAL MEDICINE | Admitting: INTERNAL MEDICINE
Payer: COMMERCIAL

## 2024-10-12 VITALS
HEART RATE: 88 BPM | DIASTOLIC BLOOD PRESSURE: 77 MMHG | OXYGEN SATURATION: 95 % | SYSTOLIC BLOOD PRESSURE: 131 MMHG | HEIGHT: 60 IN | RESPIRATION RATE: 14 BRPM | TEMPERATURE: 98.3 F | BODY MASS INDEX: 22.78 KG/M2 | WEIGHT: 116 LBS

## 2024-10-12 DIAGNOSIS — G40.909 ACUTE REPETITIVE SEIZURE (HCC): Primary | ICD-10-CM

## 2024-10-12 DIAGNOSIS — C71.9 GBM (GLIOBLASTOMA MULTIFORME) (HCC): ICD-10-CM

## 2024-10-12 DIAGNOSIS — Z51.5 HOSPICE CARE: ICD-10-CM

## 2024-10-12 PROBLEM — Z98.890 STATUS POST CRANIOTOMY: Status: ACTIVE | Noted: 2024-10-12

## 2024-10-12 PROBLEM — R41.82 AMS (ALTERED MENTAL STATUS): Status: ACTIVE | Noted: 2024-10-12

## 2024-10-12 PROBLEM — R33.9 URINARY RETENTION: Status: ACTIVE | Noted: 2024-10-12

## 2024-10-12 PROBLEM — Z87.898 HISTORY OF SEIZURE: Status: ACTIVE | Noted: 2024-10-12

## 2024-10-12 LAB
ANION GAP SERPL CALCULATED.3IONS-SCNC: 10 MMOL/L (ref 3–16)
BACTERIA URNS QL MICRO: ABNORMAL /HPF
BASOPHILS # BLD: 0.1 K/UL (ref 0–0.2)
BASOPHILS NFR BLD: 0.7 %
BILIRUB UR QL STRIP.AUTO: NEGATIVE
BUN SERPL-MCNC: 20 MG/DL (ref 7–20)
CALCIUM SERPL-MCNC: 9.2 MG/DL (ref 8.3–10.6)
CHLORIDE SERPL-SCNC: 99 MMOL/L (ref 99–110)
CLARITY UR: CLEAR
CO2 SERPL-SCNC: 25 MMOL/L (ref 21–32)
COLOR UR: YELLOW
CREAT SERPL-MCNC: 0.7 MG/DL (ref 0.6–1.2)
DEPRECATED RDW RBC AUTO: 14.4 % (ref 12.4–15.4)
EOSINOPHIL # BLD: 0.1 K/UL (ref 0–0.6)
EOSINOPHIL NFR BLD: 0.9 %
EPI CELLS #/AREA URNS AUTO: 0 /HPF (ref 0–5)
GFR SERPLBLD CREATININE-BSD FMLA CKD-EPI: >90 ML/MIN/{1.73_M2}
GLUCOSE SERPL-MCNC: 120 MG/DL (ref 70–99)
GLUCOSE UR STRIP.AUTO-MCNC: NEGATIVE MG/DL
HCT VFR BLD AUTO: 35 % (ref 36–48)
HGB BLD-MCNC: 11.7 G/DL (ref 12–16)
HGB UR QL STRIP.AUTO: ABNORMAL
HYALINE CASTS #/AREA URNS AUTO: 1 /LPF (ref 0–8)
KETONES UR STRIP.AUTO-MCNC: NEGATIVE MG/DL
LEUKOCYTE ESTERASE UR QL STRIP.AUTO: NEGATIVE
LYMPHOCYTES # BLD: 0.5 K/UL (ref 1–5.1)
LYMPHOCYTES NFR BLD: 6.4 %
MCH RBC QN AUTO: 33.8 PG (ref 26–34)
MCHC RBC AUTO-ENTMCNC: 33.3 G/DL (ref 31–36)
MCV RBC AUTO: 101.6 FL (ref 80–100)
MONOCYTES # BLD: 1 K/UL (ref 0–1.3)
MONOCYTES NFR BLD: 12.7 %
NEUTROPHILS # BLD: 6.1 K/UL (ref 1.7–7.7)
NEUTROPHILS NFR BLD: 79.3 %
NITRITE UR QL STRIP.AUTO: POSITIVE
PH UR STRIP.AUTO: 6 [PH] (ref 5–8)
PLATELET # BLD AUTO: 167 K/UL (ref 135–450)
PMV BLD AUTO: 6.9 FL (ref 5–10.5)
POTASSIUM SERPL-SCNC: 4.1 MMOL/L (ref 3.5–5.1)
PROT UR STRIP.AUTO-MCNC: ABNORMAL MG/DL
RBC # BLD AUTO: 3.45 M/UL (ref 4–5.2)
RBC CLUMPS #/AREA URNS AUTO: 3 /HPF (ref 0–4)
SODIUM SERPL-SCNC: 134 MMOL/L (ref 136–145)
SP GR UR STRIP.AUTO: 1.01 (ref 1–1.03)
UA COMPLETE W REFLEX CULTURE PNL UR: ABNORMAL
UA DIPSTICK W REFLEX MICRO PNL UR: YES
URN SPEC COLLECT METH UR: ABNORMAL
UROBILINOGEN UR STRIP-ACNC: 1 E.U./DL
WBC # BLD AUTO: 7.7 K/UL (ref 4–11)
WBC #/AREA URNS AUTO: 4 /HPF (ref 0–5)

## 2024-10-12 PROCEDURE — 51701 INSERT BLADDER CATHETER: CPT

## 2024-10-12 PROCEDURE — 51798 US URINE CAPACITY MEASURE: CPT

## 2024-10-12 PROCEDURE — 6360000002 HC RX W HCPCS: Performed by: STUDENT IN AN ORGANIZED HEALTH CARE EDUCATION/TRAINING PROGRAM

## 2024-10-12 PROCEDURE — 6370000000 HC RX 637 (ALT 250 FOR IP): Performed by: STUDENT IN AN ORGANIZED HEALTH CARE EDUCATION/TRAINING PROGRAM

## 2024-10-12 PROCEDURE — 6370000000 HC RX 637 (ALT 250 FOR IP): Performed by: PHYSICAL MEDICINE & REHABILITATION

## 2024-10-12 PROCEDURE — 70450 CT HEAD/BRAIN W/O DYE: CPT

## 2024-10-12 PROCEDURE — 36415 COLL VENOUS BLD VENIPUNCTURE: CPT

## 2024-10-12 PROCEDURE — 2060000000 HC ICU INTERMEDIATE R&B

## 2024-10-12 PROCEDURE — 81001 URINALYSIS AUTO W/SCOPE: CPT

## 2024-10-12 PROCEDURE — 6370000000 HC RX 637 (ALT 250 FOR IP): Performed by: INTERNAL MEDICINE

## 2024-10-12 PROCEDURE — 80048 BASIC METABOLIC PNL TOTAL CA: CPT

## 2024-10-12 PROCEDURE — 85025 COMPLETE CBC W/AUTO DIFF WBC: CPT

## 2024-10-12 RX ORDER — ONDANSETRON 2 MG/ML
4 INJECTION INTRAMUSCULAR; INTRAVENOUS EVERY 6 HOURS PRN
Status: DISCONTINUED | OUTPATIENT
Start: 2024-10-12 | End: 2024-10-12 | Stop reason: HOSPADM

## 2024-10-12 RX ORDER — SODIUM CHLORIDE 9 MG/ML
INJECTION, SOLUTION INTRAVENOUS PRN
Status: DISCONTINUED | OUTPATIENT
Start: 2024-10-12 | End: 2024-10-24 | Stop reason: HOSPADM

## 2024-10-12 RX ORDER — POTASSIUM CHLORIDE 1500 MG/1
40 TABLET, EXTENDED RELEASE ORAL PRN
Status: DISCONTINUED | OUTPATIENT
Start: 2024-10-12 | End: 2024-10-24 | Stop reason: HOSPADM

## 2024-10-12 RX ORDER — ENOXAPARIN SODIUM 100 MG/ML
40 INJECTION SUBCUTANEOUS DAILY
Status: DISCONTINUED | OUTPATIENT
Start: 2024-10-12 | End: 2024-10-12 | Stop reason: ALTCHOICE

## 2024-10-12 RX ORDER — POTASSIUM CHLORIDE 7.45 MG/ML
10 INJECTION INTRAVENOUS PRN
Status: DISCONTINUED | OUTPATIENT
Start: 2024-10-12 | End: 2024-10-12 | Stop reason: HOSPADM

## 2024-10-12 RX ORDER — POLYETHYLENE GLYCOL 3350 17 G/17G
17 POWDER, FOR SOLUTION ORAL DAILY PRN
Status: DISCONTINUED | OUTPATIENT
Start: 2024-10-12 | End: 2024-10-12 | Stop reason: HOSPADM

## 2024-10-12 RX ORDER — ATORVASTATIN CALCIUM 40 MG/1
40 TABLET, FILM COATED ORAL NIGHTLY
Status: DISCONTINUED | OUTPATIENT
Start: 2024-10-12 | End: 2024-10-23

## 2024-10-12 RX ORDER — SODIUM CHLORIDE 0.9 % (FLUSH) 0.9 %
5-40 SYRINGE (ML) INJECTION EVERY 12 HOURS SCHEDULED
Status: DISCONTINUED | OUTPATIENT
Start: 2024-10-12 | End: 2024-10-12 | Stop reason: HOSPADM

## 2024-10-12 RX ORDER — SODIUM CHLORIDE 9 MG/ML
INJECTION, SOLUTION INTRAVENOUS PRN
Status: DISCONTINUED | OUTPATIENT
Start: 2024-10-12 | End: 2024-10-12 | Stop reason: HOSPADM

## 2024-10-12 RX ORDER — ONDANSETRON 2 MG/ML
4 INJECTION INTRAMUSCULAR; INTRAVENOUS EVERY 6 HOURS PRN
Status: DISCONTINUED | OUTPATIENT
Start: 2024-10-12 | End: 2024-10-24 | Stop reason: HOSPADM

## 2024-10-12 RX ORDER — ACETAMINOPHEN 325 MG/1
650 TABLET ORAL EVERY 6 HOURS PRN
Status: DISCONTINUED | OUTPATIENT
Start: 2024-10-12 | End: 2024-10-12 | Stop reason: SDUPTHER

## 2024-10-12 RX ORDER — POTASSIUM CHLORIDE 1500 MG/1
40 TABLET, EXTENDED RELEASE ORAL PRN
Status: DISCONTINUED | OUTPATIENT
Start: 2024-10-12 | End: 2024-10-12 | Stop reason: HOSPADM

## 2024-10-12 RX ORDER — PROCHLORPERAZINE MALEATE 5 MG
10 TABLET ORAL EVERY 6 HOURS PRN
Status: DISCONTINUED | OUTPATIENT
Start: 2024-10-12 | End: 2024-10-12 | Stop reason: HOSPADM

## 2024-10-12 RX ORDER — ONDANSETRON 4 MG/1
4 TABLET, ORALLY DISINTEGRATING ORAL EVERY 8 HOURS PRN
Status: DISCONTINUED | OUTPATIENT
Start: 2024-10-12 | End: 2024-10-12 | Stop reason: HOSPADM

## 2024-10-12 RX ORDER — ACETAMINOPHEN 650 MG/1
650 SUPPOSITORY RECTAL EVERY 6 HOURS PRN
Status: DISCONTINUED | OUTPATIENT
Start: 2024-10-12 | End: 2024-10-24 | Stop reason: HOSPADM

## 2024-10-12 RX ORDER — MAGNESIUM SULFATE IN WATER 40 MG/ML
2000 INJECTION, SOLUTION INTRAVENOUS PRN
Status: DISCONTINUED | OUTPATIENT
Start: 2024-10-12 | End: 2024-10-24 | Stop reason: HOSPADM

## 2024-10-12 RX ORDER — METOPROLOL SUCCINATE 50 MG/1
50 TABLET, EXTENDED RELEASE ORAL DAILY
Status: DISCONTINUED | OUTPATIENT
Start: 2024-10-12 | End: 2024-10-12 | Stop reason: HOSPADM

## 2024-10-12 RX ORDER — SODIUM CHLORIDE 0.9 % (FLUSH) 0.9 %
5-40 SYRINGE (ML) INJECTION PRN
Status: DISCONTINUED | OUTPATIENT
Start: 2024-10-12 | End: 2024-10-24 | Stop reason: HOSPADM

## 2024-10-12 RX ORDER — ONDANSETRON 4 MG/1
4 TABLET, ORALLY DISINTEGRATING ORAL EVERY 8 HOURS PRN
Status: DISCONTINUED | OUTPATIENT
Start: 2024-10-12 | End: 2024-10-24 | Stop reason: HOSPADM

## 2024-10-12 RX ORDER — ACETAMINOPHEN 325 MG/1
650 TABLET ORAL EVERY 6 HOURS PRN
Status: DISCONTINUED | OUTPATIENT
Start: 2024-10-12 | End: 2024-10-12 | Stop reason: HOSPADM

## 2024-10-12 RX ORDER — LEVETIRACETAM 100 MG/ML
500 SOLUTION ORAL 2 TIMES DAILY
Status: DISCONTINUED | OUTPATIENT
Start: 2024-10-12 | End: 2024-10-13

## 2024-10-12 RX ORDER — ENOXAPARIN SODIUM 100 MG/ML
40 INJECTION SUBCUTANEOUS DAILY
Status: DISCONTINUED | OUTPATIENT
Start: 2024-10-13 | End: 2024-10-23

## 2024-10-12 RX ORDER — POTASSIUM CHLORIDE 7.45 MG/ML
10 INJECTION INTRAVENOUS PRN
Status: DISCONTINUED | OUTPATIENT
Start: 2024-10-12 | End: 2024-10-24 | Stop reason: HOSPADM

## 2024-10-12 RX ORDER — LABETALOL HYDROCHLORIDE 5 MG/ML
10 INJECTION, SOLUTION INTRAVENOUS EVERY 4 HOURS PRN
Status: DISCONTINUED | OUTPATIENT
Start: 2024-10-12 | End: 2024-10-12 | Stop reason: HOSPADM

## 2024-10-12 RX ORDER — ASPIRIN 81 MG/1
81 TABLET ORAL DAILY
Status: DISCONTINUED | OUTPATIENT
Start: 2024-10-13 | End: 2024-10-23

## 2024-10-12 RX ORDER — ACETAMINOPHEN 325 MG/1
650 TABLET ORAL EVERY 6 HOURS PRN
Status: DISCONTINUED | OUTPATIENT
Start: 2024-10-12 | End: 2024-10-24 | Stop reason: HOSPADM

## 2024-10-12 RX ORDER — ACETAMINOPHEN 650 MG/1
650 SUPPOSITORY RECTAL EVERY 6 HOURS PRN
Status: DISCONTINUED | OUTPATIENT
Start: 2024-10-12 | End: 2024-10-12 | Stop reason: HOSPADM

## 2024-10-12 RX ORDER — TAMSULOSIN HYDROCHLORIDE 0.4 MG/1
0.4 CAPSULE ORAL DAILY
Status: DISCONTINUED | OUTPATIENT
Start: 2024-10-12 | End: 2024-10-12 | Stop reason: HOSPADM

## 2024-10-12 RX ORDER — MAGNESIUM SULFATE IN WATER 40 MG/ML
2000 INJECTION, SOLUTION INTRAVENOUS PRN
Status: DISCONTINUED | OUTPATIENT
Start: 2024-10-12 | End: 2024-10-12 | Stop reason: HOSPADM

## 2024-10-12 RX ORDER — SODIUM CHLORIDE 0.9 % (FLUSH) 0.9 %
5-40 SYRINGE (ML) INJECTION EVERY 12 HOURS SCHEDULED
Status: DISCONTINUED | OUTPATIENT
Start: 2024-10-12 | End: 2024-10-24 | Stop reason: HOSPADM

## 2024-10-12 RX ORDER — LACOSAMIDE 50 MG/1
200 TABLET ORAL 2 TIMES DAILY
Status: DISCONTINUED | OUTPATIENT
Start: 2024-10-12 | End: 2024-10-17

## 2024-10-12 RX ORDER — METOPROLOL SUCCINATE 50 MG/1
50 TABLET, EXTENDED RELEASE ORAL NIGHTLY
Status: DISCONTINUED | OUTPATIENT
Start: 2024-10-12 | End: 2024-10-24 | Stop reason: HOSPADM

## 2024-10-12 RX ORDER — POLYETHYLENE GLYCOL 3350 17 G/17G
17 POWDER, FOR SOLUTION ORAL DAILY PRN
Status: DISCONTINUED | OUTPATIENT
Start: 2024-10-12 | End: 2024-10-24 | Stop reason: HOSPADM

## 2024-10-12 RX ORDER — OXYCODONE HYDROCHLORIDE 5 MG/1
10 TABLET ORAL EVERY 4 HOURS PRN
Status: DISCONTINUED | OUTPATIENT
Start: 2024-10-12 | End: 2024-10-12 | Stop reason: SDUPTHER

## 2024-10-12 RX ORDER — SODIUM CHLORIDE 0.9 % (FLUSH) 0.9 %
5-40 SYRINGE (ML) INJECTION PRN
Status: DISCONTINUED | OUTPATIENT
Start: 2024-10-12 | End: 2024-10-12 | Stop reason: HOSPADM

## 2024-10-12 RX ORDER — GABAPENTIN 100 MG/1
100 CAPSULE ORAL 3 TIMES DAILY PRN
Status: DISCONTINUED | OUTPATIENT
Start: 2024-10-12 | End: 2024-10-12 | Stop reason: HOSPADM

## 2024-10-12 RX ADMIN — HEPARIN SODIUM 5000 UNITS: 5000 INJECTION INTRAVENOUS; SUBCUTANEOUS at 15:19

## 2024-10-12 RX ADMIN — TAMSULOSIN HYDROCHLORIDE 0.4 MG: 0.4 CAPSULE ORAL at 11:12

## 2024-10-12 RX ADMIN — LACOSAMIDE 200 MG: 100 TABLET, FILM COATED ORAL at 09:24

## 2024-10-12 RX ADMIN — LACOSAMIDE 200 MG: 50 TABLET, FILM COATED ORAL at 22:59

## 2024-10-12 RX ADMIN — OXYCODONE 10 MG: 5 TABLET ORAL at 09:25

## 2024-10-12 RX ADMIN — HEPARIN SODIUM 5000 UNITS: 5000 INJECTION INTRAVENOUS; SUBCUTANEOUS at 05:09

## 2024-10-12 RX ADMIN — ATORVASTATIN CALCIUM 40 MG: 40 TABLET, FILM COATED ORAL at 23:00

## 2024-10-12 RX ADMIN — LEVETIRACETAM 500 MG: 100 SOLUTION ORAL at 23:00

## 2024-10-12 RX ADMIN — ACETAMINOPHEN 650 MG: 325 TABLET ORAL at 23:01

## 2024-10-12 RX ADMIN — METOPROLOL SUCCINATE 50 MG: 50 TABLET, EXTENDED RELEASE ORAL at 23:00

## 2024-10-12 RX ADMIN — ASPIRIN 81 MG: 81 TABLET, COATED ORAL at 09:24

## 2024-10-12 RX ADMIN — LEVETIRACETAM 500 MG: 100 SOLUTION ORAL at 09:24

## 2024-10-12 RX ADMIN — SERTRALINE 25 MG: 50 TABLET, FILM COATED ORAL at 09:24

## 2024-10-12 ASSESSMENT — PAIN DESCRIPTION - LOCATION
LOCATION: LEG;HIP
LOCATION: HEAD

## 2024-10-12 ASSESSMENT — PAIN DESCRIPTION - PAIN TYPE: TYPE: ACUTE PAIN

## 2024-10-12 ASSESSMENT — PAIN SCALES - GENERAL
PAINLEVEL_OUTOF10: 0
PAINLEVEL_OUTOF10: 0
PAINLEVEL_OUTOF10: 8
PAINLEVEL_OUTOF10: 3

## 2024-10-12 ASSESSMENT — PAIN - FUNCTIONAL ASSESSMENT: PAIN_FUNCTIONAL_ASSESSMENT: ACTIVITIES ARE NOT PREVENTED

## 2024-10-12 ASSESSMENT — PAIN SCALES - WONG BAKER
WONGBAKER_NUMERICALRESPONSE: NO HURT

## 2024-10-12 ASSESSMENT — PAIN DESCRIPTION - ORIENTATION: ORIENTATION: RIGHT;LEFT

## 2024-10-12 ASSESSMENT — PAIN DESCRIPTION - FREQUENCY: FREQUENCY: CONTINUOUS

## 2024-10-12 ASSESSMENT — PAIN DESCRIPTION - DESCRIPTORS
DESCRIPTORS: ACHING
DESCRIPTORS: ACHING

## 2024-10-12 ASSESSMENT — PAIN DESCRIPTION - ONSET: ONSET: ON-GOING

## 2024-10-12 NOTE — CONSULTS
Urology Consult Note  [facility]     Patient: Rita Dixon MRN: [id number]  Room/Bed: [facility]-4905/4905-01   YOB: 1955  Age/Sex: 68 y.o.female  Admission Date: 10/2/2024     Date of Service:  10/12/2024    Consulting Physician: Bebo Ramos MD  Admitting/Requesting Physician: Bassem Mueller MD  Primary Care Physician: Unknown, Provider    Reason for Consult: ***    ASSESSMENT/PLAN     ***    Recommendations:  ***    All the patients questions were answered. She understands the plan as listed above.    HISTORY     Chief Complaint: No chief complaint on file.      History of Present Illness: Rita Dixon is a 68 y.o. female with ***. Onset of symptoms was *** with *** course since that time. Symptoms are aggravated by ***. Symptoms improved with ***. Associated symptoms include ***. Patient also reports ***. She has tried the following treatments: ***    Past Medical History:  She has a past medical history of Arthritis, GBM (glioblastoma multiforme) (HCC), HTN (hypertension), Kidney stones, Osteoporosis, and Seizures (HCC).     Past Surgical History:  She has a past surgical history that includes brain surgery (Right, 12/29/2023); Eye surgery (Left); and craniotomy (Right, 09/24/2024).     Allergies:  Allergies   Allergen Reactions    Latex     Sulfa Antibiotics     Benadryl [Diphenhydramine] Rash    Tetracyclines & Related Rash        Social History:  She reports that she has never smoked. She has never used smokeless tobacco.     Family History:  family history is not on file.    Medications:  Scheduled Meds:   sertraline  25 mg Oral Daily    sennosides-docusate sodium  2 tablet Oral Daily    levETIRAcetam  500 mg Oral BID    aspirin  81 mg Oral Daily with breakfast    atorvastatin  40 mg Oral Nightly    lacosamide  200 mg Oral BID    heparin (porcine)  5,000 Units SubCUTAneous 3 times per day     Continuous Infusions:  PRN Meds:bisacodyl, oxyCODONE **OR** oxyCODONE, hydrALAZINE,    Allergies:  Allergies   Allergen Reactions    Latex     Sulfa Antibiotics     Benadryl [Diphenhydramine] Rash    Tetracyclines & Related Rash        Social History:  She reports that she has never smoked. She has never used smokeless tobacco.     Family History:  family history is not on file.    Medications:  Scheduled Meds:   sertraline  25 mg Oral Daily    sennosides-docusate sodium  2 tablet Oral Daily    levETIRAcetam  500 mg Oral BID    aspirin  81 mg Oral Daily with breakfast    atorvastatin  40 mg Oral Nightly    lacosamide  200 mg Oral BID    heparin (porcine)  5,000 Units SubCUTAneous 3 times per day     Continuous Infusions:  PRN Meds:bisacodyl, oxyCODONE **OR** oxyCODONE, hydrALAZINE, melatonin, ondansetron, acetaminophen, polyethylene glycol    Review of Systems:  Pertinent positives/negatives reviewed in HPI.  All other systems reviewed and negative, unless noted below.    Constitutional: Negative  Genitourinary: Difficulty with urination otherwise Negative  HEENT: Negative   Cardiovascular: Negative   Respiratory: Negative   Gastrointestinal: Negative   Musculoskeletal: Negative   Neurological: Negative   Psychiatric: Negative   Integumentary: Negative     PHYSICAL EXAM     Vitals:    10/11/24 2115   BP: (!) 143/72   Pulse: 89   Resp: 17   Temp: 99 °F (37.2 °C)   SpO2: 95%     Constitutional: NAD, well-developed, well-nourished.  HEENT: MMM.  Hearing intact.   Neck: no thyroid masses appreciated. Trachea is midline. Neck appears unremarkable.  Lymph: no palpable adenopathy in supraclavicular, or axillary lymph nodes.  Cardiovascular: Regular rate. No peripheral edema.  ABDOMEN: Abdomen soft, non-tender, non-distended. No enlarged liver or spleen. No hernias noted. Stool occult blood not indicated.  Respiratory: Respirations are even and non-labored. No audible breath sounds.  Genitourinary: no CVAT, pelvic deferred.  Psychiatric: A + O x 3, normal affect. Insight appears intact.  Muskuloskeletal:

## 2024-10-12 NOTE — PROGRESS NOTES
Called Dr. Heard about patient changed in status. Patient delayed with responses and more lethargic.  Per Dr. Heard patient needed to be sent to acute medical floor and hospitalist to consulted. Labs ordered

## 2024-10-12 NOTE — PLAN OF CARE

## 2024-10-12 NOTE — PROGRESS NOTES
ARU Discharge Assessment    Transportation  \"Has lack of transportation kept you from medical appointments, meetings, work, or from getting things needed for daily living?\"Check all that apply:  [] A.  Yes, it has kept me from medical appointments or from getting my medications  [] B.  Yes, it has kept me from non-medical meetings, appointments, work, or from getting things that I need  [x] C.  No  [] X.  Patient unable to respond  [] Y.  Patient declines to respond    Provision of Current Reconciled Medication List to Subsequent Provider at Discharge  [x] No, current reconciled medication list not provided to the subsequent provider.  [] Yes, current reconciled medication list provided to the subsequent provider. (**Select route of transmission below**)   [] Via Electronic Health Record   [] Via [employer]  [] Verbal (e.g. in person, telephone, video conferencing)  [] Paper-based (e.g. fax, copies, printouts)   [] Other Methods (e.g. texting, email, CDs)    Provision of Current Reconciled Medication List to Patient at Discharge  [] No, current reconciled medication list not provided to the patient, family and/or caregiver.   [x] Yes, current reconciled medication list provided to the patient, family and/or caregiver.  (**Select route of transmission below**)   [x] Via Electronic Health Record (e.g., electronic access to patient portal)   [] Via [employer] Information [employer] Organization  [] Verbal (e.g. in person, telephone, video conferencing)  [] Paper-based (e.g. fax, copies, printouts)   [] Other Methods (e.g. texting, email, CDs)    Health Literacy  \"How often do you need to have someone help you when you read instructions, pamphlets, or other written material from your doctor or pharmacy?\"  []  0.  Never  [x]  1.  Rarely  []  2.  Sometimes  []  3.  Often  []  4.  Always  []  7.  Patient declines to respond  []  8.  Patient unable to respond    BIMS - **Must be completed in the  [] []   Anticoagulant [x] [x]   Antibiotic [] []   Opioid [x] [x]   Antiplatelet [x] [x]   Hypoglycemic (including insulin) [] []   None of the above []     COVID-19 Vaccination Status:    Is the patient's COVID-19 vaccination status up to date? No, patient is not up to date        Yes, patient is up to date []    [x]             A patient is up to date on their COVID-19 Vaccine if they have received:  1 dose of the [id number] Moderna COVID-19 vaccine OR  1 dose of the [id number] Pfizer-BioNTech COVID-19 vaccine   If not up to date, the patient was offered an up to date COVID-19 vaccine and: Patient declined             Patient requested vaccine, Physician notified        Patient unable to respond    Vaccine currently unavailable, patient directed to PCP/Outpatient Pharmacy to receive vaccine           []    []      []    [x]     Special Treatments, Procedures, and Programs (THROUGHOUT LAST 3 DAYS OF STAY)    Check all of the following treatments, procedures, and programs that apply at discharge. At Discharge (check all that apply)   Cancer Treatments   A1. Chemotherapy []           A2. IV []           A3. Oral []           A10. Other []   B1. Radiation []   Respiratory Therapies   C1. Oxygen Therapy []           C2. Continuous (continuously for at least 14 hours per day) []           C3. Intermittent []           C4. High-concentration []   D1. Suctioning (Does not include oral suctioning) []           D2. Scheduled []           D3. As needed []   E1. Tracheostomy Care []   F1. Invasive Mechanical Ventilator (ventilator or respirator) []   G1. Non-invasive Mechanical Ventilator []           G2. BiPAP []           G3. CPAP []   Other   H1. IV Medications (Do not include sub Q pumps, flushes, Dextrose 50% or lactated ringers) []           H2. Vasoactive medications []           H3. Antibiotics []           H4. Anticoagulation []           H10. Other []   I1. Transfusions []   J1. Dialysis []           J2. Hemodialysis

## 2024-10-12 NOTE — H&P
Hospital Medicine History & Physical      PCP: Unknown, Provider    Date of Admission: 10/12/2024     Date of Service: Pt seen/examined on 10/12/2024 and Admitted to Inpatient with expected LOS greater than two midnights due to medical therapy.     Chief Complaint: Altered mental status      History Of Present Illness:    The patient is a 68 y.o. female with history of hypertension, seizure disorder, recent craniotomy for glioblastoma, recent right basal ganglia and coronary radiata CVA, seizure disorder who was rehabilitating in ARU.  Over the last couple of days, patient has had increased somnolence with lethargy of unclear etiology.  No overt seizures.  She developed urinary retention and and had multiple straight cath subsequently with [place] catheter placed.  She was seen by urology this morning.  Due to change in mentation, decision was made to admit her to acute inpatient services.  We will draw labs for CBC CMP, check a urinalysis and do CT brain at this time.    Past Medical History:        Diagnosis Date    Arthritis     GBM (glioblastoma multiforme) (HCC)     HTN (hypertension)     Kidney stones     Osteoporosis     Seizures (HCC)        Past Surgical History:        Procedure Laterality Date    BRAIN SURGERY Right 12/29/2023    CRANIOTOMY Right 09/24/2024    EYE SURGERY Left        Medications Prior to Admission:    Prior to Admission medications    Medication Sig Start Date End Date Taking? Authorizing Provider   aspirin 81 MG EC tablet Take 1 tablet by mouth daily    Bhavik Villela MD   atorvastatin (LIPITOR) 40 MG tablet Take 1 tablet by mouth Daily    Bhavik Villela MD   levETIRAcetam (KEPPRA XR) 500 MG TB24 extended release tablet Take 2 tablets by mouth daily    Bhavik Villela MD   lacosamide (VIMPAT) 50 MG TABS tablet Take 4 tablets by mouth 2 times daily. Max Daily Amount: 400 mg    Bhavik Villela MD   levETIRAcetam (KEPPRA) 500 MG tablet Take 1 tablet by mouth 2

## 2024-10-12 NOTE — PROGRESS NOTES
Rita Dixon  10/12/2024  [id number]    Chief Complaint: Acute cerebrovascular accident (CVA) due to ischemia (HCC)    Subjective    Patient seen this a.m.  Patient states she needs to be straight cath at this time due to urinary retention.  She states she can tell when her bladder is full.  Patient was seen by urology this morning.  I will start patient on Flomax to see if this helps with her ability to urinate.  She is making progress in therapy and happy with her care so far.  She continues to have left-sided hemiparesis.     Last BM (including prior to admit): 10/11/24, staff concerned with fecal impaction.           Objective    Patient Vitals for the past 24 hrs:   BP Temp Temp src Pulse Resp SpO2   10/11/24 2115 (!) 143/72 99 °F (37.2 °C) Oral 89 17 95 %     Gen: No distress, pleasant.   HEENT: Right temporal craniotomy incision is closed with staples, well healed without erythema or drainage.   CV: Regular rate and rhythm. Extremities warm, well perfused.   Resp: No respiratory distress. CTAB.  Abd: Soft, nontender.  Ext: No edema.   Neuro: Alert, oriented, appropriately interactive. Flaccid left hemiparesis, and left inattention.     Laboratory data: Available via EMR.     Therapy progress:       PT    Supine to Sit: Substantial/maximal assistance  Sit to Supine: Dependent   Sit to Stand: Substantial/maximal assistance  Chair/Bed to Chair Transfer: Substantial/maximal assistance  Car Transfer:    Ambulation 10 ft:    Ambulation 50 ft:    Ambulation 150 ft:    Stairs - 1 Step:    Stairs - 4 Step:    Stairs - 12 Step:      OT    Eating: Setup or clean-up assistance  Oral Hygiene: Supervision or touching assistance  Bathing: Dependent  Upper Body Dressing: Substantial/maximal assistance  Lower Body Dressing: Dependent  Toilet Transfer: Dependent  Toilet Hygiene: Dependent    Speech Therapy    Pt presents with moderate cognitive-linguistic deficits within the domains of immediate and short-term memory recall,  HTN  #. Bradycardia  - Continue holding home metoprolol succinate 50 mg daily, and valsartan 80 mg daily  - Hydralazine 10 mg PO every 8 hours as needed for SBP > 180 mmHg    #. Adjustment disorder w/ depressed mood  - Spiritual care consult.  - Sertraline 25 mg daily    #.  Right sided neck and shoulder pain  - Mechanical due to poor posture  - Trial soft cervical collar overnight to promote neutral head/neck positioning    CODE: Full Code  Diet: ADULT DIET; Regular  ADULT ORAL NUTRITION SUPPLEMENT; Dinner; Clear Liquid Oral Supplement  Bowels: Per protocol. Add suppository PRN, manual disimpaction if needed.   Bladder: Per protocol   Sleep: Melatonin 3 mg nightly as needed   DVT PPx: Heparin 5000 units subcutaneous every 8 hours  ELOS: 23 days  Follow-ups: NSGY, Neurology, Oncology, Rad-Onc, PCP        Bebo Addison MD  PM&R  10/12/2024  10:00 AM      * This document was created using dictation software.  While all precautions were taken to ensure accuracy, errors may have occurred.  Please disregard any typographical errors.

## 2024-10-12 NOTE — PROGRESS NOTES
Called Dr. Heard about patient change in status. Patient delayed with responses and more lethargic. Per Dr. Heard patient needed to be sent to an acute medical floor and a hospitalist needed be to consulted. Labs ordered, imaging ordered, hospitalist paged. Awaiting response. VSS. Charge nurse assistant with patient and messaging.

## 2024-10-13 ENCOUNTER — APPOINTMENT (OUTPATIENT)
Dept: CT IMAGING | Age: 69
End: 2024-10-13
Attending: INTERNAL MEDICINE
Payer: COMMERCIAL

## 2024-10-13 PROBLEM — I69.359 HEMIPARESIS DUE TO RECENT CEREBROVASCULAR ACCIDENT (CVA) (HCC): Status: ACTIVE | Noted: 2024-10-13

## 2024-10-13 PROBLEM — S06.5XAA SDH (SUBDURAL HEMATOMA): Status: ACTIVE | Noted: 2024-10-13

## 2024-10-13 PROBLEM — G40.209 LOCALIZATION-RELATED FOCAL EPILEPSY WITH COMPLEX PARTIAL SEIZURES (HCC): Status: ACTIVE | Noted: 2024-10-13

## 2024-10-13 LAB
ALBUMIN SERPL-MCNC: 3.3 G/DL (ref 3.4–5)
ALBUMIN/GLOB SERPL: 1.2 {RATIO} (ref 1.1–2.2)
ALP SERPL-CCNC: 55 U/L (ref 40–129)
ALT SERPL-CCNC: 23 U/L (ref 10–40)
ANION GAP SERPL CALCULATED.3IONS-SCNC: 10 MMOL/L (ref 3–16)
AST SERPL-CCNC: 14 U/L (ref 15–37)
BASOPHILS # BLD: 0 K/UL (ref 0–0.2)
BASOPHILS NFR BLD: 0.2 %
BILIRUB SERPL-MCNC: 1 MG/DL (ref 0–1)
BUN SERPL-MCNC: 20 MG/DL (ref 7–20)
CALCIUM SERPL-MCNC: 9.4 MG/DL (ref 8.3–10.6)
CHLORIDE SERPL-SCNC: 97 MMOL/L (ref 99–110)
CO2 SERPL-SCNC: 24 MMOL/L (ref 21–32)
CREAT SERPL-MCNC: 0.7 MG/DL (ref 0.6–1.2)
DEPRECATED RDW RBC AUTO: 14.2 % (ref 12.4–15.4)
EOSINOPHIL # BLD: 0.1 K/UL (ref 0–0.6)
EOSINOPHIL NFR BLD: 1 %
GFR SERPLBLD CREATININE-BSD FMLA CKD-EPI: >90 ML/MIN/{1.73_M2}
GLUCOSE SERPL-MCNC: 116 MG/DL (ref 70–99)
HCT VFR BLD AUTO: 33.4 % (ref 36–48)
HGB BLD-MCNC: 11.3 G/DL (ref 12–16)
LYMPHOCYTES # BLD: 0.4 K/UL (ref 1–5.1)
LYMPHOCYTES NFR BLD: 6.5 %
MCH RBC QN AUTO: 34.3 PG (ref 26–34)
MCHC RBC AUTO-ENTMCNC: 33.9 G/DL (ref 31–36)
MCV RBC AUTO: 101.1 FL (ref 80–100)
MONOCYTES # BLD: 0.9 K/UL (ref 0–1.3)
MONOCYTES NFR BLD: 14.2 %
NEUTROPHILS # BLD: 5.1 K/UL (ref 1.7–7.7)
NEUTROPHILS NFR BLD: 78.1 %
PLATELET # BLD AUTO: 149 K/UL (ref 135–450)
PMV BLD AUTO: 7 FL (ref 5–10.5)
POTASSIUM SERPL-SCNC: 4.2 MMOL/L (ref 3.5–5.1)
PROT SERPL-MCNC: 6.1 G/DL (ref 6.4–8.2)
RBC # BLD AUTO: 3.3 M/UL (ref 4–5.2)
SODIUM SERPL-SCNC: 131 MMOL/L (ref 136–145)
WBC # BLD AUTO: 6.5 K/UL (ref 4–11)

## 2024-10-13 PROCEDURE — 80053 COMPREHEN METABOLIC PANEL: CPT

## 2024-10-13 PROCEDURE — 2580000003 HC RX 258: Performed by: INTERNAL MEDICINE

## 2024-10-13 PROCEDURE — APPNB30 APP NON BILLABLE TIME 0-30 MINS

## 2024-10-13 PROCEDURE — 92610 EVALUATE SWALLOWING FUNCTION: CPT

## 2024-10-13 PROCEDURE — 2060000000 HC ICU INTERMEDIATE R&B

## 2024-10-13 PROCEDURE — 70450 CT HEAD/BRAIN W/O DYE: CPT

## 2024-10-13 PROCEDURE — 6370000000 HC RX 637 (ALT 250 FOR IP)

## 2024-10-13 PROCEDURE — 92526 ORAL FUNCTION THERAPY: CPT

## 2024-10-13 PROCEDURE — 85025 COMPLETE CBC W/AUTO DIFF WBC: CPT

## 2024-10-13 PROCEDURE — 99221 1ST HOSP IP/OBS SF/LOW 40: CPT | Performed by: NEUROMUSCULOSKELETAL MEDICINE & OMM

## 2024-10-13 PROCEDURE — 92523 SPEECH SOUND LANG COMPREHEN: CPT

## 2024-10-13 PROCEDURE — APPSS60 APP SPLIT SHARED TIME 46-60 MINUTES

## 2024-10-13 PROCEDURE — 36415 COLL VENOUS BLD VENIPUNCTURE: CPT

## 2024-10-13 PROCEDURE — 6370000000 HC RX 637 (ALT 250 FOR IP): Performed by: INTERNAL MEDICINE

## 2024-10-13 RX ORDER — LEVETIRACETAM 100 MG/ML
750 SOLUTION ORAL 2 TIMES DAILY
Status: DISCONTINUED | OUTPATIENT
Start: 2024-10-13 | End: 2024-10-15

## 2024-10-13 RX ORDER — LABETALOL HYDROCHLORIDE 5 MG/ML
10 INJECTION, SOLUTION INTRAVENOUS EVERY 4 HOURS PRN
Status: DISCONTINUED | OUTPATIENT
Start: 2024-10-13 | End: 2024-10-24 | Stop reason: HOSPADM

## 2024-10-13 RX ADMIN — LACOSAMIDE 200 MG: 50 TABLET, FILM COATED ORAL at 09:19

## 2024-10-13 RX ADMIN — LEVETIRACETAM 500 MG: 100 SOLUTION ORAL at 09:19

## 2024-10-13 RX ADMIN — Medication 10 ML: at 04:37

## 2024-10-13 RX ADMIN — ATORVASTATIN CALCIUM 40 MG: 40 TABLET, FILM COATED ORAL at 21:15

## 2024-10-13 RX ADMIN — METOPROLOL SUCCINATE 50 MG: 50 TABLET, EXTENDED RELEASE ORAL at 21:14

## 2024-10-13 RX ADMIN — ACETAMINOPHEN 650 MG: 325 TABLET ORAL at 15:51

## 2024-10-13 RX ADMIN — LEVETIRACETAM 750 MG: 100 SOLUTION ORAL at 21:14

## 2024-10-13 RX ADMIN — Medication 10 ML: at 09:19

## 2024-10-13 RX ADMIN — LACOSAMIDE 200 MG: 50 TABLET, FILM COATED ORAL at 21:14

## 2024-10-13 RX ADMIN — Medication 10 ML: at 21:15

## 2024-10-13 RX ADMIN — POLYETHYLENE GLYCOL 3350 17 G: 17 POWDER, FOR SOLUTION ORAL at 21:22

## 2024-10-13 RX ADMIN — ACETAMINOPHEN 650 MG: 325 TABLET ORAL at 21:14

## 2024-10-13 ASSESSMENT — PAIN DESCRIPTION - DESCRIPTORS
DESCRIPTORS: ACHING
DESCRIPTORS: ACHING

## 2024-10-13 ASSESSMENT — PAIN DESCRIPTION - LOCATION
LOCATION: HEAD
LOCATION: LEG

## 2024-10-13 ASSESSMENT — PAIN SCALES - GENERAL
PAINLEVEL_OUTOF10: 4
PAINLEVEL_OUTOF10: 3
PAINLEVEL_OUTOF10: 0

## 2024-10-13 ASSESSMENT — PAIN DESCRIPTION - ORIENTATION: ORIENTATION: RIGHT;LEFT

## 2024-10-13 NOTE — CARE COORDINATION
10/13/24 1043   Readmission Assessment   Number of Days since last admission? 1-7 days   Previous Disposition Acute Rehab   Who is being Interviewed Unable to Complete  (Pt did not remember)   What was the patient's/caregiver's perception as to why they think they needed to return back to the hospital? Other (Comment)  (Pt became medically unstable)   Did you visit your Primary Care Physician after you left the hospital, before you returned this time? No   Why weren't you able to visit your PCP? Other (Comment)  (Pt was transferred from ARU)   Did you see a specialist, such as Cardiac, Pulmonary, Orthopedic Physician, etc. after you left the hospital? No  (Pt was transferred from ARU)   Who advised the patient to return to the hospital? Physician  (Pt was transferred from ARU)   Does the patient report anything that got in the way of taking their medications? No  (Pt was transferred from ARU)   In our efforts to provide the best possible care to you and others like you, can you think of anything that we could have done to help you after you left the hospital the first time, so that you might not have needed to return so soon? Other (Comment)  (Pt was transferred from ARU)     Electronically signed by WESLEY Olivarez on 10/13/24 at 10:51 AM EDT

## 2024-10-13 NOTE — PROGRESS NOTES
Hospitalist Progress Note      PCP: Unknown, Provider    Date of Admission: 10/12/2024    LOS: 1    Chief Complaint: No chief complaint on file.      Case Summary:    68 y.o. female with history of hypertension, seizure disorder, recent craniotomy for glioblastoma, recent right basal ganglia and coronary radiata CVA, seizure disorder who was rehabilitating in ARU who has been somnolent with lethargy and noted to have urinary retention concerning for altered mentation.  CT head done noted for high density hemorrhage of the right temporal lobe with a trace amount of intraventricular hemorrhage and an known hypodensity in the anterior right frontal, right basal ganglia and centrum semiovale's of suggestive of recent infarct        Active Hospital Problems    Diagnosis Date Noted    SDH (subdural hematoma) [S06.5XAA] 10/13/2024    AMS (altered mental status) [R41.82] 10/12/2024    Urinary retention [R33.9] 10/12/2024    Status post craniotomy [Z98.890] 10/12/2024    History of seizure [Z87.898] 10/12/2024    Glioblastoma (HCC) [C71.9] 10/12/2024    Acute cerebrovascular accident (CVA) due to ischemia (HCC) [I63.9] 10/02/2024         Principal Problem:    SDH (subdural hematoma): CT head done yesterday noted for subdural hematoma.  The last MRI done at  9/29/2024 noted for the known right basal ganglia and corona radiata CVA, hemorrhage within the right temporal resection cavity and trace intraventricular hemorrhage.  Overnight, hospitalist discussed case with  neurosurgery who wanted comparison of current imaging with last imaging to assess if this is new compared to previously.  If so, then would warrant transfer to .  Discussed with [place] radiology this morning in view of retrieving  images for comparison and update.  -Will get MRI brain today  - Neurosurgery consult  -Hold anticoagulants and antiplatelet therapy  - Await neurology consult    Addendum: Received message from neurosurgery for [facility]

## 2024-10-13 NOTE — PROGRESS NOTES
2045: Paged Elvia Shankar:    pt admitted from ARU today for increased lethargy, had recent R craniotomy for glioblastoma and recent R CVA, Radiology called with STAT results: pt has a neurology consult    1. 1.8 x 1.6 x 4.0 cm high density hemorrhage in the medial aspect of the  right temporal lobe, likely a subdural hemorrhage or intraparenchymal  hemorrhage.  There is trace amount of intraventricular hemorrhage.  2. Hypodensity in the anterior right frontal lobe, right basal ganglia,  centrum semiovale suggesting recent infarct.  3. Right-sided temporal cranioplasty changes.    2054:Received call back from Elvia Kaminski: told to call neuro surgeon on call     2058: PAGED DR MELVIN Perez    pt admitted from ARU today for increased lethargy, had recent R craniotomy for glioblastoma and recent R CVA, Radiology called with STAT results: pt has a neurology consult    1. 1.8 x 1.6 x 4.0 cm high density hemorrhage in the medial aspect of the  right temporal lobe, likely a subdural hemorrhage or intraparenchymal  hemorrhage.  There is trace amount of intraventricular hemorrhage.  2. Hypodensity in the anterior right frontal lobe, right basal ganglia,  centrum semiovale suggesting recent infarct.  3. Right-sided temporal cranioplasty changes.    2102: recived call back from Neuro surgery Dr. Melvin Perez:     2108: Paged [facility] neurocritical care, they stated they already called about this patient.     2108: Revieved page from Elvia Kaminski that Dr. Butler spoke with neuro NP at [facility] and is reaching out to     2120:Dr. Butler and SRINIVAS Kaminski at bedside assessing patient

## 2024-10-13 NOTE — SIGNIFICANT EVENT
CT head without contrast that was ordered by day team came back with critical result of 1.8 x 1.6 x 4 cm high density hemorrhage in the medial aspect of the right temporal lobe likely a subdural hemorrhage or intraparenchymal hemorrhage, there is trace amount of intraventricular hemorrhage.  Transfer center was called to initiate consult to neurosurgery.  Initially consult was sent to [facility] neurosurgery before I knew that patient had craniotomy at .   was subsequently consulted to speak with their on-call neurosurgeon.  Dr. Ragsdale, on-call neurosurgeon, reviewed case with me.  Stated that he wanted to have radiologist review patient's last CT from  and this CT to make sure that the findings were new and not 1 that were present upon discharge.  Transfer centers worked to get images sent to radiologist so that [id number] CT images could be compared.  However, the images were not able to be shared between systems due to IT error.  Upon serial examinations of patient's neurologic exam was grossly intact.  Will wait for a.m. comparison of 2 CT images.  Dr. Ragsdale stated that he would accept the patient if there was a new bleed.  However if there was not in these were present on prior CT he thinks it is more appropriate for patient to stay at [facility].  Will continue serial neurologic exam on patient to evaluate for evolving bleed.

## 2024-10-13 NOTE — PROGRESS NOTES
, Blended word boundaries , and Imprecise articulation      VOICE  Voice: Mild   Weak     COGNITION    Overall Orientation : Moderate  and Severe    Oriented to self    Attention: Moderate    Impaired Sustained Attention  Impaired Alternating Attention  Impaired Divided Attention    Memory: Severe    Impaired Short-term Memory  Impaired Recall of New Learning     Problem Solving: To be further assessed      Safety/Judgement: To be further assessed      Goals     Goals:  Dysphagia Goals: Pt will functionally tolerate recommended diet with no overt clinical s/s of aspiration   Pt will advance to least restrictive diet as indicated   If s/s of pharyngeal phase dysphagia continue to be noted pt will participate in instrumental assessment of swallow function  to further assess pharyngeal phase of swallow, assist with diet recommendations and to further direct plan of care     Speech Language/Cognitive Goals: Pt will demonstrate improved motor initiation for verbal expression of wants/needs to 80% min cues   Pt will improve auditory processing/comprehension of commands and questions to 80%, via graded tasks   Pt will improve orientation and short term recall  via graded tasks to 80%  Pt will participate in ongoing cognitive assessment with goals to be established as indicated     POC/Education     Dysphagia Therapeutic Intervention:  Patient/Family Education , Therapeutic Trials with SLP , Instrumental assessment of swallow function (Modified Barium Swallow Study) , Instrumental assessment of swallow function (SLP FEES EVALUATION)     Plan of care: 3-5 times per week during acute care stay.      Education:  Provided education regarding role of SLP, results of assessment, recommendations and general speech pathology plan of care:  Pt requires ongoing learning   No evidence of comprehension    If patient discharges prior to next visit, this note will serve as discharge.     Treatment time:  Timed Code Treatment Minutes: 0

## 2024-10-13 NOTE — PROGRESS NOTES
Pt has HTN at midday vitals.  190/100.  Dr notified that we don't have prn BP meds ordered.  Pt rechecked after 5 minutes and BP down to 168/82.  Pt has no outward symptoms.  Will continue to monitor.

## 2024-10-13 NOTE — PLAN OF CARE
Problem: Discharge Planning  Goal: Discharge to home or other facility with appropriate resources  Outcome: Progressing  Flowsheets (Taken 10/12/2024 2000)  Discharge to home or other facility with appropriate resources: Identify barriers to discharge with patient and caregiver     Problem: Chronic Conditions and Co-morbidities  Goal: Patient's chronic conditions and co-morbidity symptoms are monitored and maintained or improved  Outcome: Progressing  Flowsheets (Taken 10/12/2024 2000)  Care Plan - Patient's Chronic Conditions and Co-Morbidity Symptoms are Monitored and Maintained or Improved: Monitor and assess patient's chronic conditions and comorbid symptoms for stability, deterioration, or improvement     Problem: Skin/Tissue Integrity  Goal: Absence of new skin breakdown  Description: 1.  Monitor for areas of redness and/or skin breakdown  2.  Assess vascular access sites hourly  3.  Every 4-6 hours minimum:  Change oxygen saturation probe site  4.  Every 4-6 hours:  If on nasal continuous positive airway pressure, respiratory therapy assess nares and determine need for appliance change or resting period.  Outcome: Progressing     Problem: ABCDS Injury Assessment  Goal: Absence of physical injury  Outcome: Progressing     Problem: Safety - Adult  Goal: Free from fall injury  Outcome: Progressing

## 2024-10-13 NOTE — CONSULTS
In patient Neurology consult    [facility]  MD Rita Pierre  1955    Date of Service: 10/13/2024    Referring Physician: Jony Escoto MD      Reason for the consult and CC: Acute encephalopathy    HPI:   The patient is a 68 y.o.  years old female with past medical history of hypertension, GBM status post resection, focal epilepsy and other medical problems who was admitted to [facility] ARU on 10/03/2024.  In brief patient is followed by neurosurgery at [facility] and underwent right temporal lobe GBM.  Subsequently she developed tonic-clonic seizure and was noted to have left-sided weakness.  Patient was started on Keppra and Vimpat and was discharged home 09/27.  However patient had worsening left-sided weakness and subsequently returned the next day.  Patient underwent CT head which showed right basal ganglia ischemic stroke.  CTA showed right MCA M2 territory severe stenosis.  She underwent MRI brain which confirmed acute right basal ganglia and corona radiata stroke.  Stroke was felt to be due to vasospasm status post surgery.  Patient was stabilized, remain on her antibiotics and dexamethasone taper per neurosurgery recommendation and eventually was discharged to ARU at [facility].    On 10/12/2024, patient noted to have increased somnolence and lethargy.  No witnessed seizure activity during hospitalization.  Patient was noted to the hospital for further evaluation neurology was consulted.  Patient underwent CT head yesterday which showed right temporal lobe subdural hemorrhage and trace amount of intraventricular hemorrhage.   neurosurgery was consulted who recommended comparison of images from  and they would consider transferring back to [facility].  This morning patient seen and seen AAOx3 and able to follow directions.  She has same left-sided weakness.  She denies any headaches, vision changes, dizziness, dysarthria or dysphagia.  Other review of

## 2024-10-14 PROCEDURE — 2060000000 HC ICU INTERMEDIATE R&B

## 2024-10-14 PROCEDURE — 6360000002 HC RX W HCPCS: Performed by: INTERNAL MEDICINE

## 2024-10-14 PROCEDURE — APPSS30 APP SPLIT SHARED TIME 16-30 MINUTES

## 2024-10-14 PROCEDURE — 6370000000 HC RX 637 (ALT 250 FOR IP)

## 2024-10-14 PROCEDURE — 97535 SELF CARE MNGMENT TRAINING: CPT

## 2024-10-14 PROCEDURE — 6370000000 HC RX 637 (ALT 250 FOR IP): Performed by: INTERNAL MEDICINE

## 2024-10-14 PROCEDURE — 97530 THERAPEUTIC ACTIVITIES: CPT

## 2024-10-14 PROCEDURE — 2580000003 HC RX 258: Performed by: INTERNAL MEDICINE

## 2024-10-14 PROCEDURE — 99232 SBSQ HOSP IP/OBS MODERATE 35: CPT | Performed by: NEUROMUSCULOSKELETAL MEDICINE & OMM

## 2024-10-14 PROCEDURE — 97162 PT EVAL MOD COMPLEX 30 MIN: CPT

## 2024-10-14 PROCEDURE — 97167 OT EVAL HIGH COMPLEX 60 MIN: CPT

## 2024-10-14 PROCEDURE — APPNB30 APP NON BILLABLE TIME 0-30 MINS

## 2024-10-14 RX ORDER — LEVETIRACETAM 750 MG/1
750 TABLET, FILM COATED, EXTENDED RELEASE ORAL DAILY
Qty: 30 TABLET | Refills: 0 | Status: SHIPPED | OUTPATIENT
Start: 2024-10-14 | End: 2024-11-13

## 2024-10-14 RX ORDER — METOPROLOL SUCCINATE 50 MG/1
50 TABLET, EXTENDED RELEASE ORAL NIGHTLY
Qty: 30 TABLET | Refills: 3 | Status: SHIPPED | OUTPATIENT
Start: 2024-10-14

## 2024-10-14 RX ADMIN — LEVETIRACETAM 750 MG: 100 SOLUTION ORAL at 20:24

## 2024-10-14 RX ADMIN — Medication 10 ML: at 10:03

## 2024-10-14 RX ADMIN — Medication 10 ML: at 20:24

## 2024-10-14 RX ADMIN — LABETALOL HYDROCHLORIDE 10 MG: 5 INJECTION, SOLUTION INTRAVENOUS at 02:06

## 2024-10-14 RX ADMIN — ONDANSETRON 4 MG: 2 INJECTION, SOLUTION INTRAMUSCULAR; INTRAVENOUS at 02:17

## 2024-10-14 RX ADMIN — LACOSAMIDE 200 MG: 50 TABLET, FILM COATED ORAL at 09:54

## 2024-10-14 RX ADMIN — METOPROLOL SUCCINATE 50 MG: 50 TABLET, EXTENDED RELEASE ORAL at 20:23

## 2024-10-14 RX ADMIN — ATORVASTATIN CALCIUM 40 MG: 40 TABLET, FILM COATED ORAL at 20:24

## 2024-10-14 RX ADMIN — LEVETIRACETAM 750 MG: 100 SOLUTION ORAL at 09:55

## 2024-10-14 RX ADMIN — LACOSAMIDE 200 MG: 50 TABLET, FILM COATED ORAL at 20:23

## 2024-10-14 NOTE — PROGRESS NOTES
Rita Dixon  10/14/2024  [id number]    Chief Complaint: AMS (altered mental status)    Subjective   Since last seen, medical updates:  - Transferred off of inpatient rehab on 10/12.  CT Head showed right temporal lobe hemorrhage, which was discussed with radiology and the patient's surgery team at , and felt to be stable from prior therefore no need for acute transfer.  Neurology consulted increased Keppra to 750 mg twice daily, and continued lacosamide at 200 mg twice daily.    Patient's  provides collateral information this morning, as patient had recently fallen back to sleep.  He says that she has been more tired and generally weak, requiring more physical assistance since last week.  He assisted her in eating her breakfast this morning.  They remain optimistic about returning to inpatient rehab when able.         Objective   Objective:  Patient Vitals for the past 24 hrs:   BP Temp Temp src Pulse Resp SpO2 Weight   10/14/24 1600 119/66 98 °F (36.7 °C) Axillary 79 18 96 % --   10/14/24 1130 131/77 98.7 °F (37.1 °C) Oral 81 16 95 % --   10/14/24 0813 139/77 99 °F (37.2 °C) Oral 82 -- 94 % --   10/14/24 0431 130/76 98 °F (36.7 °C) Oral 87 16 96 % --   10/14/24 0221 (!) 157/75 -- -- 77 -- -- --   10/14/24 0220 -- -- -- -- -- -- 49.4 kg (109 lb)   10/14/24 0200 (!) 164/72 98.3 °F (36.8 °C) Oral 78 16 98 % --     Gen: No distress, sleeping in hospital bed.  HEENT: Right temporal craniotomy incision intact.  CV: Extremities warm, perfused.  Resp: No respiratory distress. No increased WOB on room air.  Ext: No edema.   Neuro: Deferred, patient asleep.     Laboratory data: Available via EMR.     Therapy progress:    PT    Rolling: Level of difficulty - Total   Sit to Stand from a Chair: Level of difficulty - Total  Supine to Sit: Level of difficulty - Total     Bed to Chair: Physical Assistance Required - Total  Ambulate Across Room: Physical Assistance Required - Total  Ascend 3-5 Steps With HR: Physical

## 2024-10-14 NOTE — CARE COORDINATION
10/14/24 1026   IMM Letter   IMM Letter given to Patient/Family/Significant other/Guardian/POA/by: IMM given by CM   IMM Letter date given: 10/14/24   IMM Letter time given: 0940

## 2024-10-14 NOTE — DISCHARGE SUMMARY
Physical Medicine & Rehabilitation  Discharge Summary     Patient Identification:  Rita Dixon  : 1955  Admit date: 10/2/2024  Discharge date: 10/12/2024  Attending provider: No att. providers found        Primary care provider: Unknown, Provider     Discharge Diagnoses:   Patient Active Problem List   Diagnosis    Generalized tonic-clonic seizure (HCC)    Abnormal CT of the head    Acute cerebrovascular accident (CVA) due to ischemia (HCC)    AMS (altered mental status)    History of seizure    GBM (glioblastoma multiforme) (HCC)    Status post craniotomy    Urinary retention    Subdural hematoma due to concussion    Localization-related focal epilepsy with complex partial seizures (HCC)    Hemiparesis due to recent cerebrovascular accident (CVA) (HCC)       Discharge Functional Status:      Physical therapy:  Supine to Sit: Substantial/maximal assistance  Sit to Supine: Dependent      Sit to Stand: Substantial/maximal assistance  Chair/Bed to Chair Transfer: Substantial/maximal assistance  Car Transfer:       Ambulation 10 ft:    Ambulation 50 ft:    Ambulation 150 ft:    Stairs - 1 Step:    Stairs - 4 Step:    Stairs - 12 Step:      Occupational therapy:  Eating: Setup or clean-up assistance  Oral Hygiene: Supervision or touching assistance  Bathing: Dependent  Upper Body Dressing: Substantial/maximal assistance  Lower Body Dressing: Dependent     Toilet Transfer: Dependent  Toilet Hygiene: Dependent    Speech therapy:    Pt presents with moderate cognitive-linguistic deficits within the domains of immediate and short-term memory recall, attention, problem solving and executive function. Pt is tangential in conversation with impaired thought organization however demonstrates functional expression of wants/needs and ability to follow commands/answer simple/complex questions. She demonstrates inattention to L side and requires min verbal/tactile cues to direct attention. Pt continues to demonstrate

## 2024-10-14 NOTE — PROGRESS NOTES
[facility] - Inpatient Rehabilitation Department   Phone: [phone]    Physical Therapy    [x] Initial Evaluation            [] Daily Treatment Note         [] Discharge Summary      Patient: Rita Dixon   : 1955   MRN: [id number]   Date of Service:  10/14/2024  Admitting Diagnosis: AMS (altered mental status)  Current Admission Summary:  Per H&P, \"Patient is a 68 y.o. female with history of hypertension, seizure disorder, recent craniotomy for glioblastoma, recent right basal ganglia and coronary radiata CVA, seizure disorder who was rehabilitating in ARU.  Over the last couple of days, patient has had increased somnolence with lethargy of unclear etiology.  No overt seizures.  She developed urinary retention and and had multiple straight cath subsequently with Latham catheter placed.  She was seen by urology this morning. Due to change in mentation, decision was made to admit her to acute inpatient services.\"     CT head 10/13/24 1602: Stable 5 mm hemorrhage right posterior parietooccipital region ; Stable hemorrhage medial aspect right temporal lobe         Past Medical History:  has a past medical history of Arthritis, GBM (glioblastoma multiforme) (HCC), HTN (hypertension), Kidney stones, Osteoporosis, and Seizures (HCC).  Past Surgical History:  has a past surgical history that includes brain surgery (Right, 2023); Eye surgery (Left); and craniotomy (Right, 2024).    Discharge Recommendations: Rita Dixon scored a 6/ on the AM-PAC short mobility form. Current research shows that an AM-PAC score of 17 or less is typically not associated with a discharge to the patient's home setting. Based on the patient's AM-PAC score and their current functional mobility deficits, it is recommended that the patient have 5-7 sessions per week of Physical Therapy at d/c to increase the patient's independence.  At this time, this patient demonstrates complex nursing, medical, and  telesitter in use, nurse notified, and family/caregiver present    Plan  Frequency: 5-7 x/week  Current Treatment Recommendations: strengthening, ROM, balance training, functional mobility training, transfer training, gait training, endurance training, patient/caregiver education, ADL/self-care training, IADL training, cognitive/perceptual training, cognitive reorientation, home exercise program, safety education, equipment evaluation/education, and positioning    Goals  Patient Goals: none stated    Short Term Goals:  Time Frame: by d/c   Patient will complete rolling with maximum assistance   Patient will complete supine <> sit with maximum assistance   Patient will sit EOB for 5 mins with moderate assistance   Patient will complete transfers with maximum assistance     Above goals reviewed on 10/14/2024.  All goals are ongoing at this time unless indicated above.      Therapy Session Time      Individual Group Co-treatment   Time In     1249   Time Out     1433   Minutes     104     Timed Code Treatment Minutes:  89 Minutes  Total Treatment Minutes:  104       Electronically Signed By: Alexus Tellez, BRITTA    PT providing direct supervision during session and assisting in making skilled judgements throughout session.  Tete Haddad PT, DPT [id number]

## 2024-10-14 NOTE — PLAN OF CARE
Problem: Discharge Planning  Goal: Discharge to home or other facility with appropriate resources  Outcome: Progressing  Flowsheets (Taken 10/13/2024 2000)  Discharge to home or other facility with appropriate resources: Identify barriers to discharge with patient and caregiver     Problem: Chronic Conditions and Co-morbidities  Goal: Patient's chronic conditions and co-morbidity symptoms are monitored and maintained or improved  Outcome: Progressing  Flowsheets (Taken 10/13/2024 2000)  Care Plan - Patient's Chronic Conditions and Co-Morbidity Symptoms are Monitored and Maintained or Improved: Monitor and assess patient's chronic conditions and comorbid symptoms for stability, deterioration, or improvement     Problem: Skin/Tissue Integrity  Goal: Absence of new skin breakdown  Description: 1.  Monitor for areas of redness and/or skin breakdown  2.  Assess vascular access sites hourly  3.  Every 4-6 hours minimum:  Change oxygen saturation probe site  4.  Every 4-6 hours:  If on nasal continuous positive airway pressure, respiratory therapy assess nares and determine need for appliance change or resting period.  Outcome: Progressing     Problem: ABCDS Injury Assessment  Goal: Absence of physical injury  Outcome: Progressing     Problem: Safety - Adult  Goal: Free from fall injury  Outcome: Progressing     Problem: Pain  Goal: Verbalizes/displays adequate comfort level or baseline comfort level  Outcome: Progressing

## 2024-10-14 NOTE — PROGRESS NOTES
SLP Attempt  Rita Dixon  1955    Attempted to see Pt for SLP/Dysphagia follow up treatment. Pt is currently working with OT/PT and is unable to participate in SLP treatment at this time. Will re-attempt at a later time as schedule allows.    Edith Muhammad [facility]-SLP#[id number]

## 2024-10-14 NOTE — PROGRESS NOTES
[facility] - Inpatient Rehabilitation Department   Phone: [phone]    Occupational Therapy    [x] Initial Evaluation            [] Daily Treatment Note         [] Discharge Summary      Patient: Rita Dixon   : 1955   MRN: [id number]   Date of Service:  10/14/2024    Admitting Diagnosis:  AMS (altered mental status)  Current Admission Summary: Per H&P, \"patient is a 68 y.o. female with history of hypertension, seizure disorder, recent craniotomy for glioblastoma, recent right basal ganglia and coronary radiata CVA, seizure disorder who was rehabilitating in ARU.  Over the last couple of days, patient has had increased somnolence with lethargy of unclear etiology.  No overt seizures.  She developed urinary retention and and had multiple straight cath subsequently with Latham catheter placed.  She was seen by urology this morning. Due to change in mentation, decision was made to admit her to acute inpatient services.\"    Urinalysis unremarkable per Internal Medicine progress note updated 10/14/24 1107    CT head 10/13/24 1602: Stable 5 mm hemorrhage right posterior parietooccipital region ; Stable hemorrhage medial aspect right temporal lobe     Past Medical History:  has a past medical history of Arthritis, GBM (glioblastoma multiforme) (HCC), HTN (hypertension), Kidney stones, Osteoporosis, and Seizures (HCC).  Past Surgical History:  has a past surgical history that includes brain surgery (Right, 2023); Eye surgery (Left); and craniotomy (Right, 2024).    Discharge Recommendations: Rita Dixon scored a 8/24 on the AM-PAC ADL Inpatient form. Current research shows that an AM-PAC score of 17 or less is typically not associated with a discharge to the patient's home setting. Based on the patient's AM-PAC score and their current ADL deficits, it is recommended that the patient have 5-7 sessions per week of Occupational Therapy at d/c to increase the patient's independence.  At

## 2024-10-14 NOTE — PROGRESS NOTES
Rita Destiny  Neurology Follow-up  [facility] Neurology    Date of Service: 10/14/2024    Subjective:   CC: Follow up today regarding: Acute encephalopathy    Events noted. Chart and lab reviewed.  Patient seen this morning she is resting in bed.   is at bedside.  We discussed repeat CT head which showed stable findings from prior scan.  Today patient with same left-sided weakness but she denies headache, dizziness, neck pain, speech or vision disturbance.  Other review of systems unremarkable      ROS : A 10-12 system review obtained and updated today and is unremarkable except as mentioned  in my interval history.     Past medical history, social history, medication and family history reviewed.       Objective:  Exam:   Constitutional:   Vitals:    10/14/24 0221 10/14/24 0431 10/14/24 0813 10/14/24 1130   BP: (!) 157/75 130/76 139/77 131/77   Pulse: 77 87 82 81   Resp:  16  16   Temp:  98 °F (36.7 °C) 99 °F (37.2 °C) 98.7 °F (37.1 °C)   TempSrc:  Oral Oral Oral   SpO2:  96% 94% 95%   Weight:       Height:         General appearance:  Normal development and appear in no acute distress.   Mental Status:   Oriented to person, place, problem, and time.    Memory: Good immediate recall.  Intact remote memory  Normal attention span and concentration.  Language: intact naming, repeating and fluency   Good fund of Knowledge.   Cranial Nerves:   II:   Pupils: equal, round, reactive to light  III,IV,VI: Extra Ocular Movements are intact. No nystagmus  V: Facial sensation is intact  VII: Facial strength and movements: intact and symmetric  XII: Tongue movements are normal  Musculoskeletal: Same left-sided weakness, flaccid.  5/5 right side extremities.  Tone: Normal tone.   Reflexes: Symmetric 2+ in both arms and legs.  Coordination: No abnormal movements, no tremors  Sensation: normal.  Gait/Posture: Nonambulatory    MDM:      A. Problems (any 1)    High:    [x] Acute/Chronic Illness/injury posing threat to  life or bodily function:    [] Severe exacerbation of chronic illness:      Moderate:    []     1 or more chronic illness with exacerbation, progression or side effect of treatment or  []     2 or more stable chronic illnesses or  []     1 acute illness with systemic symptoms     ---------------------------------------------------------------------  B. Risk of Treatment (any 1)   [] Drugs/treatments that require intensive monitoring for toxicity include:    [] Change in code status:    [] Decision to escalate care:    [] Major surgery/procedure with associated risk factors:    [x] Prescription drug management  ----------------------------------------------------------------------  [x] High (any 2)  [] Moderate (any 1)    C. Data (any 2 for high and any 1 for moderate)  [x] Discussed management of the case with: Family, primary team  [x] Imaging personally reviewed and interpreted, includes:    [x] Data Review (any 3)  [x] Collateral history obtained from:    [x] All available Consultant notes from yesterday/today were reviewed  [x] All current labs were reviewed and interpreted for clinical significance   [x] Appropriate follow-up labs were ordered      Data  LABS:   Lab Results   Component Value Date/Time     10/13/2024 04:57 AM    K 4.2 10/13/2024 04:57 AM    CL 97 10/13/2024 04:57 AM    CO2 24 10/13/2024 04:57 AM    BUN 20 10/13/2024 04:57 AM    CREATININE 0.7 10/13/2024 04:57 AM    LABGLOM >90 10/13/2024 04:57 AM    LABGLOM >60 09/26/2023 11:13 AM    GLUCOSE 116 10/13/2024 04:57 AM    MG 1.80 09/26/2023 11:13 AM    CALCIUM 9.4 10/13/2024 04:57 AM     Lab Results   Component Value Date/Time    WBC 6.5 10/13/2024 04:57 AM    RBC 3.30 10/13/2024 04:57 AM    HGB 11.3 10/13/2024 04:57 AM    HCT 33.4 10/13/2024 04:57 AM    .1 10/13/2024 04:57 AM    RDW 14.2 10/13/2024 04:57 AM     10/13/2024 04:57 AM     Lab Results   Component Value Date    INR 1.06 09/23/2023    PROTIME 13.8 09/23/2023

## 2024-10-14 NOTE — PROGRESS NOTES
Hospitalist Progress Note      PCP: Unknown, Provider    Date of Admission: 10/12/2024    LOS: 2    Chief Complaint: No chief complaint on file.      Case Summary:    68 y.o. female with history of hypertension, seizure disorder, recent craniotomy for glioblastoma, recent right basal ganglia and coronary radiata CVA, seizure disorder who was rehabilitating in ARU who has been somnolent with lethargy and noted to have urinary retention concerning for altered mentation.    CT head done noted for high density hemorrhage of the right temporal lobe with a trace amount of intraventricular hemorrhage and an known hypodensity in the anterior right frontal, right basal ganglia and centrum semiovale's of suggestive of recent infarct.  The last MRI done at  9/29/2024 noted for the known right basal ganglia and corona radiata CVA, hemorrhage within the right temporal resection cavity and trace intraventricular hemorrhage.  These findings were discussed with neurosurgery at  as well as our neurology.  Neurosurgery felt the neurosurgical imaging had not worsened.  A repeat CT brain confirmed stability.  Patient was seen by neurology suspicious for possible seizures.  Keppra dose was increased.      Active Hospital Problems    Diagnosis Date Noted    Subdural hematoma due to concussion [S06.5XAA] 10/13/2024    Localization-related focal epilepsy with complex partial seizures (HCC) [G40.209] 10/13/2024    Hemiparesis due to recent cerebrovascular accident (CVA) (HCC) [I69.359] 10/13/2024    AMS (altered mental status) [R41.82] 10/12/2024    Urinary retention [R33.9] 10/12/2024    Status post craniotomy [Z98.890] 10/12/2024    History of seizure [Z87.898] 10/12/2024    GBM (glioblastoma multiforme) (HCC) [C71.9] 10/12/2024    Acute cerebrovascular accident (CVA) due to ischemia (HCC) [I63.9] 10/02/2024         Principal Problem:    History of seizures: Patient had altered mentation at rehab.  CT head showed stable subdural  10/12/2024 04:46 PM    BLOODU TRACE 10/12/2024 04:46 PM    GLUCOSEU Negative 10/12/2024 04:46 PM       Radiology:  CT HEAD WO CONTRAST   Final Result   Stable 5 mm hemorrhage right posterior parietooccipital region      Stable hemorrhage medial aspect right temporal lobe                 Jony Escoto MD      Please excuse brevity and/or typos. This report was transcribed using voice recognition software. Every effort was made to ensure accuracy, however, inadvertent computerized transcription errors may be present.

## 2024-10-15 PROBLEM — G40.909 ACUTE REPETITIVE SEIZURE (HCC): Status: ACTIVE | Noted: 2024-10-15

## 2024-10-15 LAB
ANION GAP SERPL CALCULATED.3IONS-SCNC: 14 MMOL/L (ref 3–16)
BASOPHILS # BLD: 0 K/UL (ref 0–0.2)
BASOPHILS NFR BLD: 0.2 %
BUN SERPL-MCNC: 29 MG/DL (ref 7–20)
CALCIUM SERPL-MCNC: 9.6 MG/DL (ref 8.3–10.6)
CHLORIDE SERPL-SCNC: 101 MMOL/L (ref 99–110)
CO2 SERPL-SCNC: 24 MMOL/L (ref 21–32)
CREAT SERPL-MCNC: 0.7 MG/DL (ref 0.6–1.2)
DEPRECATED RDW RBC AUTO: 14.4 % (ref 12.4–15.4)
EOSINOPHIL # BLD: 0.1 K/UL (ref 0–0.6)
EOSINOPHIL NFR BLD: 1.5 %
GFR SERPLBLD CREATININE-BSD FMLA CKD-EPI: >90 ML/MIN/{1.73_M2}
GLUCOSE SERPL-MCNC: 127 MG/DL (ref 70–99)
HCT VFR BLD AUTO: 34.4 % (ref 36–48)
HGB BLD-MCNC: 11.4 G/DL (ref 12–16)
LYMPHOCYTES # BLD: 0.3 K/UL (ref 1–5.1)
LYMPHOCYTES NFR BLD: 7.6 %
MCH RBC QN AUTO: 33.9 PG (ref 26–34)
MCHC RBC AUTO-ENTMCNC: 33.1 G/DL (ref 31–36)
MCV RBC AUTO: 102.2 FL (ref 80–100)
MONOCYTES # BLD: 0.6 K/UL (ref 0–1.3)
MONOCYTES NFR BLD: 13.7 %
NEUTROPHILS # BLD: 3.3 K/UL (ref 1.7–7.7)
NEUTROPHILS NFR BLD: 77 %
PLATELET # BLD AUTO: 157 K/UL (ref 135–450)
PMV BLD AUTO: 6.6 FL (ref 5–10.5)
POTASSIUM SERPL-SCNC: 3.8 MMOL/L (ref 3.5–5.1)
RBC # BLD AUTO: 3.37 M/UL (ref 4–5.2)
SODIUM SERPL-SCNC: 139 MMOL/L (ref 136–145)
WBC # BLD AUTO: 4.3 K/UL (ref 4–11)

## 2024-10-15 PROCEDURE — 6360000002 HC RX W HCPCS: Performed by: NEUROMUSCULOSKELETAL MEDICINE & OMM

## 2024-10-15 PROCEDURE — 2580000003 HC RX 258: Performed by: NEUROMUSCULOSKELETAL MEDICINE & OMM

## 2024-10-15 PROCEDURE — 99233 SBSQ HOSP IP/OBS HIGH 50: CPT | Performed by: NEUROMUSCULOSKELETAL MEDICINE & OMM

## 2024-10-15 PROCEDURE — C9254 INJECTION, LACOSAMIDE: HCPCS | Performed by: INTERNAL MEDICINE

## 2024-10-15 PROCEDURE — 2500000003 HC RX 250 WO HCPCS: Performed by: NEUROMUSCULOSKELETAL MEDICINE & OMM

## 2024-10-15 PROCEDURE — 80048 BASIC METABOLIC PNL TOTAL CA: CPT

## 2024-10-15 PROCEDURE — 6370000000 HC RX 637 (ALT 250 FOR IP)

## 2024-10-15 PROCEDURE — 85025 COMPLETE CBC W/AUTO DIFF WBC: CPT

## 2024-10-15 PROCEDURE — 6360000002 HC RX W HCPCS: Performed by: INTERNAL MEDICINE

## 2024-10-15 PROCEDURE — APPSS30 APP SPLIT SHARED TIME 16-30 MINUTES

## 2024-10-15 PROCEDURE — 36415 COLL VENOUS BLD VENIPUNCTURE: CPT

## 2024-10-15 PROCEDURE — 2580000003 HC RX 258: Performed by: INTERNAL MEDICINE

## 2024-10-15 PROCEDURE — APPNB30 APP NON BILLABLE TIME 0-30 MINS

## 2024-10-15 PROCEDURE — 6370000000 HC RX 637 (ALT 250 FOR IP): Performed by: INTERNAL MEDICINE

## 2024-10-15 PROCEDURE — 2060000000 HC ICU INTERMEDIATE R&B

## 2024-10-15 RX ORDER — LEVETIRACETAM 500 MG/5ML
750 INJECTION, SOLUTION, CONCENTRATE INTRAVENOUS EVERY 12 HOURS
Status: DISCONTINUED | OUTPATIENT
Start: 2024-10-15 | End: 2024-10-17

## 2024-10-15 RX ADMIN — Medication 10 ML: at 08:43

## 2024-10-15 RX ADMIN — VALPROATE SODIUM 1000 MG: 100 INJECTION, SOLUTION INTRAVENOUS at 11:35

## 2024-10-15 RX ADMIN — SODIUM CHLORIDE 1000 ML: 9 INJECTION, SOLUTION INTRAVENOUS at 22:46

## 2024-10-15 RX ADMIN — ONDANSETRON 4 MG: 2 INJECTION, SOLUTION INTRAMUSCULAR; INTRAVENOUS at 09:36

## 2024-10-15 RX ADMIN — VALPROATE SODIUM 250 MG: 100 INJECTION, SOLUTION INTRAVENOUS at 16:53

## 2024-10-15 RX ADMIN — LACOSAMIDE 200 MG: 50 TABLET, FILM COATED ORAL at 08:43

## 2024-10-15 RX ADMIN — LEVETIRACETAM 750 MG: 100 SOLUTION ORAL at 08:43

## 2024-10-15 RX ADMIN — SODIUM CHLORIDE 1 MG: 9 INJECTION INTRAMUSCULAR; INTRAVENOUS; SUBCUTANEOUS at 10:03

## 2024-10-15 RX ADMIN — SODIUM CHLORIDE 1 MG: 9 INJECTION INTRAMUSCULAR; INTRAVENOUS; SUBCUTANEOUS at 10:15

## 2024-10-15 RX ADMIN — LACOSAMIDE 200 MG: 10 INJECTION INTRAVENOUS at 22:48

## 2024-10-15 RX ADMIN — LEVETIRACETAM 750 MG: 100 INJECTION INTRAVENOUS at 22:40

## 2024-10-15 ASSESSMENT — PAIN SCALES - GENERAL
PAINLEVEL_OUTOF10: 0
PAINLEVEL_OUTOF10: 0

## 2024-10-15 ASSESSMENT — PAIN SCALES - WONG BAKER
WONGBAKER_NUMERICALRESPONSE: NO HURT
WONGBAKER_NUMERICALRESPONSE: NO HURT

## 2024-10-15 NOTE — PLAN OF CARE

## 2024-10-15 NOTE — ACP (ADVANCE CARE PLANNING)
Purpose of Encounter: Advanced care planning in light of hospitalization for seizure episodes  Parties in attendance: :  Juan C Roman MD,  Francis  Decisional Capacity:No  Objective: to determine goals of care   Goals of Care Determinations: Patient was very somnolent after seizure and getting Ativan .   at bedside mentioned that he had advanced directive completed showing a resuscitation effort.  He was to continue medical treatment but understand prognosis is poor and does not want any aggressive measures or even transfer to a different hospital  Code Status: DNR CCA  Time Spent on Advanced Planning Documents: 19  mins.  Advanced Care Planning Documents:   Completed advances directives, advanced directives in chart. POA  Francis

## 2024-10-15 NOTE — PROGRESS NOTES
Pt's spouse called this RN in room, pt having nausea and staring off at ground. Pt followed commands, squeezing this nurses hand when asked. Zofran given at 0936, notified hospitalist. Neuro notified, neuro on unit came to assess pt. 1 mg ativan given at 1003 and another 1 mg given at 1015. Pt right hand twitching.      BP currently 133/76, 72 BPM, 97% on 1L NC, 17 respirations. Pt lethargic at this time but following commands. Standard safety measures in place, seizure precautions in place. Spouse at bedside. Door open and call light within reach.     1105- Pt lethargic, VSS. Spouse at bedside. No twitching of R hand noted on assessment.

## 2024-10-15 NOTE — CARE COORDINATION
Prior Authorization submitted for ARU approval with a reference number: [id number].     ARU will continue to follow progress and update discharge plan as needed.    DIEGO ResendizN, .[phone]

## 2024-10-15 NOTE — PROGRESS NOTES
10/15/24 1257   Encounter Summary   Encounter Overview/Reason Rituals, Rites and Sacraments   Service Provided For Patient and family together  (spouse at bedside)   Referral/Consult From Nurse  (ref: request for a  per family placed by Marge López, RN)   Support System Spouse   Last Encounter  10/15/24  (call placed for a  at [facility])   Complexity of Encounter High   Begin Time 1200   End Time  1215   Total Time Calculated 15 min   Spiritual/Emotional needs   Type Spiritual Support  (Fr Issa Apodaca on the way.)   Assessment/Intervention/Outcome   Assessment Concerns with suffering;Sad   Intervention Active listening;Explored/Affirmed feelings, thoughts, concerns;Sustaining Presence/Ministry of presence   Outcome Acceptance;Encouraged;Expressed Gratitude

## 2024-10-15 NOTE — PROGRESS NOTES
Physical/Occupational Therapy  Rita Dixon    Attempted PT/OT treatment this date. Pt supine in bed with spouse present at bedside. Spouse refusing therapy at this time due to pt's medical status. Will re-attempt treatment session tomorrow as schedule allows and as pt becomes medically appropriate.    Tete Haddad PT, DPT [id number]   Ashleigh Jewell OTR/L [id number]

## 2024-10-15 NOTE — PROGRESS NOTES
Referral for patient/family requesting  visit for Sacrament of Sick.  Patient received Sacrament of Sick from Fr. Issa Apodaca, as requested.

## 2024-10-15 NOTE — PROGRESS NOTES
.          Hospitalist Progress Note      PCP: Unknown, Provider    Date of Admission: 10/12/2024    LOS: 3    Chief Complaint: No chief complaint on file.      Case Summary:    68 y.o. female with history of hypertension, seizure disorder, recent craniotomy for glioblastoma, recent right basal ganglia and coronary radiata CVA, seizure disorder who was rehabilitating in ARU who has been somnolent with lethargy and noted to have urinary retention concerning for altered mentation.    CT head done noted for high density hemorrhage of the right temporal lobe with a trace amount of intraventricular hemorrhage and an known hypodensity in the anterior right frontal, right basal ganglia and centrum semiovale's of suggestive of recent infarct.  The last MRI done at  9/29/2024 noted for the known right basal ganglia and corona radiata CVA, hemorrhage within the right temporal resection cavity and trace intraventricular hemorrhage.  These findings were discussed with neurosurgery at  as well as our neurology.  Neurosurgery felt the neurosurgical imaging had not worsened.  A repeat CT brain confirmed stability.  Patient was seen by neurology suspicious for possible seizures.       Active Hospital Problems    Diagnosis Date Noted    Acute repetitive seizure (HCC) [G40.909] 10/15/2024    SDH (subdural hematoma) [S06.5XAA] 10/13/2024    Localization-related focal epilepsy with complex partial seizures (HCC) [G40.209] 10/13/2024    Hemiparesis due to recent cerebrovascular accident (CVA) (HCC) [I69.359] 10/13/2024    AMS (altered mental status) [R41.82] 10/12/2024    Urinary retention [R33.9] 10/12/2024    Status post craniotomy [Z98.890] 10/12/2024    History of seizure [Z87.898] 10/12/2024    Glioblastoma (HCC) [C71.9] 10/12/2024    Acute cerebrovascular accident (CVA) due to ischemia (HCC) [I63.9] 10/02/2024         Principal Problem:    History of seizures: Patient had altered mentation at rehab.  CT head showed stable

## 2024-10-15 NOTE — PROGRESS NOTES
Speech Language Pathology  Attempt     Rita Dixon   1955     Attempted to see pt for follow up dysphagia therapy. Therapy held due to medical status and alertness. Per pt's spouse, pt with seizure episodes this morning and is currently sedated. Will re-attempt as schedule permits pending medical status and pt's ability to participate.     Signature  Aditi Barriga M.A. [facility]-SLP SEmmaP. [id number]  Speech-Language Pathologist   10/15/2024 1:39 PM

## 2024-10-15 NOTE — PROGRESS NOTES
Rita Destniy  Neurology Follow-up  [facility] Neurology    Date of Service: 10/15/2024    Subjective:   CC: Follow up today regarding: Acute encephalopathy    Events noted. Chart and lab reviewed.  Asked to see patient this morning as she noted to have an episode of emesis.  Patient also noted to be staring off and minimally responding.  Upon up arrival to the room patient was awake alert and following directions.  Noted to have more dysarthric speech.  Shortly after patient noted to have rhythmic fasciculation in the right arm and leg.  No GTC activity during this time.  Patient was awake and following directions at this time.  Blood pressure noted to be elevated systolic 200s.  Did receive 2 mg of Ativan, which helped stop the fasciculation.        ROS : A 10-12 system review obtained and updated today and is unremarkable except as mentioned  in my interval history.     Past medical history, social history, medication and family history reviewed.       Objective:  Exam:   Constitutional:   Vitals:    10/15/24 0423 10/15/24 0743 10/15/24 0800 10/15/24 1003   BP: 124/74 (!) 152/74  (!) 200/73   Pulse: 70 68     Resp: 18 17     Temp: 97.7 °F (36.5 °C) 97.5 °F (36.4 °C)     TempSrc: Axillary Axillary     SpO2: 95% 95%     Weight:   54 kg (119 lb)    Height:           General appearance: Lethargic  Mental Status:   AAO times one only  Attention and concentration: poor, waxing and waning.   Language: Dysarthric speech  Recent memory: Impaired  Remote memory: Impaired   Poor fund of knowledge  Cranial Nerves:   II: Pupils: equal, round, reactive to light  III,IV,VI: No gaze preference. No nystagmus  V: Facial sensation: Left facial asymmetry  VII: Facial strength and movements: Cannot be assessed  XII: Tongue movements : Cannot be assessed  Musculoskeletal: Same left side weakness, able to move right side extremity.  Tone: Normal tone.  No rigidity.  Reflexes: symmetric 2+ in both arms and legs  Coordination:

## 2024-10-16 ENCOUNTER — APPOINTMENT (OUTPATIENT)
Dept: CT IMAGING | Age: 69
End: 2024-10-16
Attending: INTERNAL MEDICINE
Payer: COMMERCIAL

## 2024-10-16 PROCEDURE — 2580000003 HC RX 258: Performed by: INTERNAL MEDICINE

## 2024-10-16 PROCEDURE — 6360000002 HC RX W HCPCS: Performed by: INTERNAL MEDICINE

## 2024-10-16 PROCEDURE — C9254 INJECTION, LACOSAMIDE: HCPCS | Performed by: INTERNAL MEDICINE

## 2024-10-16 PROCEDURE — 2580000003 HC RX 258: Performed by: STUDENT IN AN ORGANIZED HEALTH CARE EDUCATION/TRAINING PROGRAM

## 2024-10-16 PROCEDURE — 2500000003 HC RX 250 WO HCPCS: Performed by: NEUROMUSCULOSKELETAL MEDICINE & OMM

## 2024-10-16 PROCEDURE — 2580000003 HC RX 258: Performed by: NEUROMUSCULOSKELETAL MEDICINE & OMM

## 2024-10-16 PROCEDURE — 2060000000 HC ICU INTERMEDIATE R&B

## 2024-10-16 PROCEDURE — 6360000002 HC RX W HCPCS: Performed by: NEUROMUSCULOSKELETAL MEDICINE & OMM

## 2024-10-16 PROCEDURE — 99232 SBSQ HOSP IP/OBS MODERATE 35: CPT | Performed by: NEUROMUSCULOSKELETAL MEDICINE & OMM

## 2024-10-16 PROCEDURE — 95819 EEG AWAKE AND ASLEEP: CPT

## 2024-10-16 PROCEDURE — 70450 CT HEAD/BRAIN W/O DYE: CPT

## 2024-10-16 RX ORDER — SODIUM CHLORIDE 450 MG/100ML
INJECTION, SOLUTION INTRAVENOUS CONTINUOUS
Status: DISCONTINUED | OUTPATIENT
Start: 2024-10-16 | End: 2024-10-17

## 2024-10-16 RX ADMIN — LEVETIRACETAM 750 MG: 100 INJECTION INTRAVENOUS at 21:24

## 2024-10-16 RX ADMIN — Medication 10 ML: at 09:22

## 2024-10-16 RX ADMIN — Medication 10 ML: at 21:24

## 2024-10-16 RX ADMIN — VALPROATE SODIUM 250 MG: 100 INJECTION, SOLUTION INTRAVENOUS at 18:03

## 2024-10-16 RX ADMIN — SODIUM CHLORIDE: 4.5 INJECTION, SOLUTION INTRAVENOUS at 11:46

## 2024-10-16 RX ADMIN — VALPROATE SODIUM 250 MG: 100 INJECTION, SOLUTION INTRAVENOUS at 00:54

## 2024-10-16 RX ADMIN — VALPROATE SODIUM 250 MG: 100 INJECTION, SOLUTION INTRAVENOUS at 10:01

## 2024-10-16 RX ADMIN — LACOSAMIDE 200 MG: 10 INJECTION INTRAVENOUS at 09:29

## 2024-10-16 RX ADMIN — LEVETIRACETAM 750 MG: 100 INJECTION INTRAVENOUS at 09:22

## 2024-10-16 RX ADMIN — LACOSAMIDE 200 MG: 10 INJECTION INTRAVENOUS at 21:30

## 2024-10-16 NOTE — PROGRESS NOTES
Pt has been asleep all night. Pt would respond to her name but would drift back into sleep immediately after. Pt was not able to follow commands during head to toe assessment nor take oral medication. Pharmacy was contacted to switch PO meds to IV. NP aware of metoprolol not being due to being PO given. PRN labetalol in orders for SBP >160.

## 2024-10-16 NOTE — PROGRESS NOTES
Rita Destiny  Neurology Follow-up  [facility]    Date of Service: 10/16/2024    Subjective:   CC: Follow up today regarding: Acute encephalopathy    Events noted. Chart and lab reviewed.  Patient seen this morning,  is at bedside.  Discussed with nurse, the patient has been somnolent most the day yesterday and this morning after receiving Ativan yesterday.  Patient does arouse and but closes eyes immediately.   Other review of systems limited      ROS : A 10-12 system review obtained and updated today and is unremarkable except as mentioned  in my interval history.     Past medical history, social history, medication and family history reviewed.       Objective:  Exam:   Constitutional:   Vitals:    10/15/24 2345 10/16/24 0345 10/16/24 0723 10/16/24 0815   BP: (!) 157/68 133/86 127/81    Pulse: 90 89 87    Resp: 16 16 16    Temp: 99.2 °F (37.3 °C) 99.5 °F (37.5 °C) 98.2 °F (36.8 °C)    TempSrc: Axillary Axillary Axillary    SpO2: 100% 90% 98%    Weight:    56.7 kg (125 lb)   Height:           General appearance: Lethargic  Mental Status:   AAO times one only  Attention and concentration: poor, waxing and waning.  Open eyes but closes immediately.  Language: Cannot be assessed  Recent memory: Cannot be assessed  Remote memory: Cannot be assessed  Cannot assess fund of knowledge  Cranial Nerves:   II: Pupils: equal, round, reactive to light, sluggish  III,IV,VI: No gaze preference. No nystagmus  V: Facial sensation: Left facial asymmetry  VII: Facial strength and movements: Cannot be assessed  XII: Tongue movements : Cannot be assessed  Musculoskeletal: Same left side weakness, able to move right side extremity.  Tone: Normal tone.  No rigidity.  Reflexes: symmetric 2+ in both arms and legs  Coordination: No abnormal movements, no fasciculations noted today  Sensation: No abnormal sensory deficit  Gait: Cannot be assessed due to patient condition    MDM:      A. Problems (any

## 2024-10-16 NOTE — PROGRESS NOTES
.          Hospitalist Progress Note      PCP: Unknown, Provider    Date of Admission: 10/12/2024    LOS: 4    Chief Complaint: No chief complaint on file.      Case Summary:    68 y.o. female with history of hypertension, seizure disorder, recent craniotomy for glioblastoma, recent right basal ganglia and coronary radiata CVA, seizure disorder who was rehabilitating in ARU who has been somnolent with lethargy and noted to have urinary retention concerning for altered mentation.    CT head done noted for high density hemorrhage of the right temporal lobe with a trace amount of intraventricular hemorrhage and an known hypodensity in the anterior right frontal, right basal ganglia and centrum semiovale's of suggestive of recent infarct.  The last MRI done at  9/29/2024 noted for the known right basal ganglia and corona radiata CVA, hemorrhage within the right temporal resection cavity and trace intraventricular hemorrhage.  These findings were discussed with neurosurgery at  as well as our neurology.  Neurosurgery felt the neurosurgical imaging had not worsened.  A repeat CT brain confirmed stability.  Patient was seen by neurology suspicious for possible seizures.       Active Hospital Problems    Diagnosis Date Noted    Acute repetitive seizure (HCC) [G40.909] 10/15/2024    Subdural hematoma due to concussion [S06.5XAA] 10/13/2024    Localization-related focal epilepsy with complex partial seizures (HCC) [G40.209] 10/13/2024    Hemiparesis due to recent cerebrovascular accident (CVA) (HCC) [I69.359] 10/13/2024    AMS (altered mental status) [R41.82] 10/12/2024    Urinary retention [R33.9] 10/12/2024    Status post craniotomy [Z98.890] 10/12/2024    History of seizure [Z87.898] 10/12/2024    GBM (glioblastoma multiforme) (HCC) [C71.9] 10/12/2024    Acute cerebrovascular accident (CVA) due to ischemia (HCC) [I63.9] 10/02/2024         Principal Problem:    History of seizures: Patient had altered mentation at  acetaminophen **OR** acetaminophen      DVT Prophylaxis: SCD  Diet: ADULT DIET; Dysphagia - Minced and Moist  Code Status: DNR-CCA    Dispo: Medically stable for discharge to rehab pending pre-CERT    ____________________________________________________________________________    Subjective:   Overnight Events:   Pt has been sleepy  , opens eyes  briefly. No more seizure episodes noted.hasn't eaten much    Physical Exam:  /84   Pulse 78   Temp 98.2 °F (36.8 °C) (Axillary)   Resp 15   Ht 1.524 m (5')   Wt 56.7 kg (125 lb)   SpO2 100%   BMI 24.41 kg/m²   General appearance: Somnolent   Neck: Supple, no thyromegally. No jugular venous distention.   Respiratory:  Normal respiratory effort. Clear to auscultation, no Rales/Wheezes/Rhonchi.  Cardiovascular: S1/S2 without murmurs, rubs or gallops. RRR  Abdomen: Soft, non-tender, non-distended, bowel sounds present.  Musculoskeletal: No clubbing, cyanosis or edema bilaterally.    Skin: Skin color, texture, turgor normal.  No rashes or lesions.  Neurologic:  Cranial nerves: II-XII intact, DENIA.  Left hemiplegia        Intake/Output Summary (Last 24 hours) at 10/16/2024 1614  Last data filed at 10/16/2024 1147  Gross per 24 hour   Intake --   Output 775 ml   Net -775 ml       Labs:   Recent Labs     10/15/24  1024   WBC 4.3   HGB 11.4*   HCT 34.4*         Recent Labs     10/15/24  1024      K 3.8      CO2 24   BUN 29*   CREATININE 0.7   CALCIUM 9.6     No results for input(s): \"CKTOTAL\", \"TROPONINI\" in the last 72 hours.    Urinalysis:    Lab Results   Component Value Date/Time    NITRU POSITIVE 10/12/2024 04:46 PM    WBCUA 4 10/12/2024 04:46 PM    BACTERIA 4+ 10/12/2024 04:46 PM    RBCUA 3 10/12/2024 04:46 PM    BLOODU TRACE 10/12/2024 04:46 PM    GLUCOSEU Negative 10/12/2024 04:46 PM       Radiology:  CT HEAD WO CONTRAST   Final Result   Stable intraventricular hemorrhage.  The previously noted area of hemorrhage   in the right

## 2024-10-16 NOTE — PROGRESS NOTES
Rita Dixon  10/15/2024  [id number]    Chief Complaint: AMS (altered mental status)    Subjective   Since last seen, medical updates:  - Breakthrough seizures observed today, loaded w/ IV valproic acid per Neurology.     Patient's  provides collateral information again today morning, as patient had recently received Ativan following her seizure episode. He would like to avoid transfer to another hospital at this time, given his understanding of patient's overall prognosis, he would like to spare her another ambulance ride which has traumatized her in the past. He remains hopeful but realistic about her chances to recover to the point of tolerating inpatient rehab again.          Objective   Objective:  Patient Vitals for the past 24 hrs:   BP Temp Temp src Pulse Resp SpO2 Weight   10/15/24 1915 (!) 159/92 97.9 °F (36.6 °C) Axillary 76 16 100 % --   10/15/24 1645 132/84 97.5 °F (36.4 °C) Axillary 72 16 99 % --   10/15/24 1229 (!) 146/82 -- -- 74 15 100 % --   10/15/24 1020 133/76 -- -- 72 -- 96 % --   10/15/24 1003 (!) 200/73 -- -- -- -- -- --   10/15/24 0800 -- -- -- -- -- -- 54 kg (119 lb)   10/15/24 0743 (!) 152/74 97.5 °F (36.4 °C) Axillary 68 17 95 % --   10/15/24 0423 124/74 97.7 °F (36.5 °C) Axillary 70 18 95 % --   10/14/24 2300 (!) 144/74 97.5 °F (36.4 °C) Oral 74 18 94 % --     Gen: No distress, sleeping in hospital bed.  HEENT: Right temporal craniotomy incision intact.  CV: Extremities warm, perfused.  Resp: No respiratory distress. No increased WOB on room air.  Ext: No edema.   Neuro: Asleep. Does not awaken to voice, or chest rub. Spontaneously moving right arm and leg occasionally, but no movement of left side. No significant tone, or clonus on the left.     Laboratory data: Available via EMR.     Therapy progress:    PT    Rolling: Level of difficulty - Total   Sit to Stand from a Chair: Level of difficulty - Total  Supine to Sit: Level of difficulty - Total     Bed to Chair: Physical

## 2024-10-16 NOTE — PLAN OF CARE
Problem: Discharge Planning  Goal: Discharge to home or other facility with appropriate resources  Outcome: Progressing  Flowsheets (Taken 10/13/2024 2000 by Christina Borjas, RN)  Discharge to home or other facility with appropriate resources: Identify barriers to discharge with patient and caregiver     Problem: Chronic Conditions and Co-morbidities  Goal: Patient's chronic conditions and co-morbidity symptoms are monitored and maintained or improved  Outcome: Progressing  Flowsheets (Taken 10/13/2024 2000 by Christina Borjas RN)  Care Plan - Patient's Chronic Conditions and Co-Morbidity Symptoms are Monitored and Maintained or Improved: Monitor and assess patient's chronic conditions and comorbid symptoms for stability, deterioration, or improvement     Problem: Skin/Tissue Integrity  Goal: Absence of new skin breakdown  Description: 1.  Monitor for areas of redness and/or skin breakdown  2.  Assess vascular access sites hourly  3.  Every 4-6 hours minimum:  Change oxygen saturation probe site  4.  Every 4-6 hours:  If on nasal continuous positive airway pressure, respiratory therapy assess nares and determine need for appliance change or resting period.  Outcome: Progressing     Problem: ABCDS Injury Assessment  Goal: Absence of physical injury  Outcome: Progressing  Flowsheets (Taken 10/16/2024 0045)  Absence of Physical Injury: Implement safety measures based on patient assessment     Problem: Safety - Adult  Goal: Free from fall injury  Outcome: Progressing     Problem: Pain  Goal: Verbalizes/displays adequate comfort level or baseline comfort level  Outcome: Progressing  Flowsheets (Taken 10/16/2024 0045)  Verbalizes/displays adequate comfort level or baseline comfort level: Encourage patient to monitor pain and request assistance     Problem: Decision Making  Goal: Pt/Family able to effectively weigh alternatives and participate in decision making related to treatment and care  Description: INTERVENTIONS:  1.

## 2024-10-16 NOTE — ACP (ADVANCE CARE PLANNING)
Advance Care Planning     Palliative Team Advance Care Planning (ACP) Conversation    Date of Conversation: 10/16/24    Individuals present for the conversation: Patient and Spouse Francis     ACP documents on file prior to discussion:  -None    Previously completed document/s not on file: Patient / participant reports that there are no previously executed ACP documents.    Healthcare Decision Maker:    Primary Decision Maker: Francis Dixon - Spouse - [phone]     Conversation Summary:  DNR-CCA. Confirmed with  at bedside. Patient unresponsive. No ACP paperwork on file.      [place] Hierarchy of Surrogate Decision Makers (In ABSENCE of an appointed healthcare agent in Advance Directive / Healthcare Power of )   1. Legal, Court-Appointed Guardian for patient   2. Spouse of patient (except where there is pending divorce, dissolution, legal separation, or annulment proceeding has been filed)   3. Adult child of the patient (if more than one, majority of the reasonably available children)   4. Parent of the patient   5. Adult sibling of the patient (if more than one, majority of the reasonably available siblings)   6. The nearest relative of the patient by blood relationship or adoption who is reasonably available   [place] Code 2133.08      Resuscitation Status:   Code Status: DNR-CCA     Documentation Completed:  -No new documents completed.    I spent 30 minutes with the patient and/or surrogate decision maker discussing the patient's wishes and goals.      Ayla Allison RN

## 2024-10-16 NOTE — PLAN OF CARE
Problem: Discharge Planning  Goal: Discharge to home or other facility with appropriate resources  10/16/2024 1206 by Marge López  Outcome: Progressing  10/16/2024 0045 by Staci Shields RN  Outcome: Progressing  Flowsheets (Taken 10/13/2024 2000 by Christina Borjas, RN)  Discharge to home or other facility with appropriate resources: Identify barriers to discharge with patient and caregiver     Problem: Chronic Conditions and Co-morbidities  Goal: Patient's chronic conditions and co-morbidity symptoms are monitored and maintained or improved  10/16/2024 1206 by Marge López  Outcome: Progressing  10/16/2024 0045 by Staci Shields RN  Outcome: Progressing  Flowsheets (Taken 10/13/2024 2000 by Christina Borjas, RN)  Care Plan - Patient's Chronic Conditions and Co-Morbidity Symptoms are Monitored and Maintained or Improved: Monitor and assess patient's chronic conditions and comorbid symptoms for stability, deterioration, or improvement     Problem: Skin/Tissue Integrity  Goal: Absence of new skin breakdown  Description: 1.  Monitor for areas of redness and/or skin breakdown  2.  Assess vascular access sites hourly  3.  Every 4-6 hours minimum:  Change oxygen saturation probe site  4.  Every 4-6 hours:  If on nasal continuous positive airway pressure, respiratory therapy assess nares and determine need for appliance change or resting period.  10/16/2024 1206 by Marge López  Outcome: Progressing  10/16/2024 0045 by Staci Shields RN  Outcome: Progressing     Problem: ABCDS Injury Assessment  Goal: Absence of physical injury  10/16/2024 1206 by Marge López  Outcome: Progressing  10/16/2024 0045 by Staci Shields RN  Outcome: Progressing  Flowsheets (Taken 10/16/2024 0045)  Absence of Physical Injury: Implement safety measures based on patient assessment     Problem: Safety - Adult  Goal: Free from fall injury  10/16/2024 1206 by Marge Lópze  Outcome: Progressing  10/16/2024 0045 by Cornelius

## 2024-10-16 NOTE — CONSULTS
Palliative Care:    a 68 y.o. female with history of hypertension, seizure disorder, recent craniotomy for glioblastoma, recent right basal ganglia and coronary radiata CVA, seizure disorder who was rehabilitating in ARU.  Over the last couple of days, patient has had increased somnolence with lethargy of unclear etiology.  No overt seizures.  She developed urinary retention and and had multiple straight cath subsequently with [place] catheter placed. She was seen by urology this morning. Due to change in mentation, decision was made to admit her to acute inpatient services.  Patient admitted 10/12/24. Palliative consult placed 10/16/24.     Past Medical History:   has a past medical history of Arthritis, GBM (glioblastoma multiforme) (HCC), HTN (hypertension), Kidney stones, Osteoporosis, and Seizures (HCC).    Past Surgical History:   has a past surgical history that includes brain surgery (Right, 12/29/2023); Eye surgery (Left); and craniotomy (Right, 09/24/2024).    Advance Directives:      DNR-CCA. Confirmed with  at bedside. Patient unresponsive. No ACP paperwork on file.     [place] Hierarchy of Surrogate Decision Makers (In ABSENCE of an appointed healthcare agent in Advance Directive / Healthcare Power of )   1. Legal, Court-Appointed Guardian for patient   2. Spouse of patient (except where there is pending divorce, dissolution, legal separation, or annulment proceeding has been filed)   3. Adult child of the patient (if more than one, majority of the reasonably available children)   4. Parent of the patient   5. Adult sibling of the patient (if more than one, majority of the reasonably available siblings)   6. The nearest relative of the patient by blood relationship or adoption who is reasonably available   [place] Code 2133.08      Problem Severity: Pain/Other Symptoms:   seizure precautions. Patient somnolent and not responding at this time. EEG and CT ordered/pending.        Bed

## 2024-10-17 LAB — VALPROATE SERPL-MCNC: 66 UG/ML (ref 50–100)

## 2024-10-17 PROCEDURE — 99231 SBSQ HOSP IP/OBS SF/LOW 25: CPT | Performed by: NEUROMUSCULOSKELETAL MEDICINE & OMM

## 2024-10-17 PROCEDURE — 80164 ASSAY DIPROPYLACETIC ACD TOT: CPT

## 2024-10-17 PROCEDURE — 95819 EEG AWAKE AND ASLEEP: CPT | Performed by: NEUROMUSCULOSKELETAL MEDICINE & OMM

## 2024-10-17 PROCEDURE — 51702 INSERT TEMP BLADDER CATH: CPT

## 2024-10-17 PROCEDURE — C9254 INJECTION, LACOSAMIDE: HCPCS | Performed by: INTERNAL MEDICINE

## 2024-10-17 PROCEDURE — 2500000003 HC RX 250 WO HCPCS: Performed by: NEUROMUSCULOSKELETAL MEDICINE & OMM

## 2024-10-17 PROCEDURE — 2580000003 HC RX 258: Performed by: STUDENT IN AN ORGANIZED HEALTH CARE EDUCATION/TRAINING PROGRAM

## 2024-10-17 PROCEDURE — APPNB30 APP NON BILLABLE TIME 0-30 MINS

## 2024-10-17 PROCEDURE — 2060000000 HC ICU INTERMEDIATE R&B

## 2024-10-17 PROCEDURE — 2580000003 HC RX 258: Performed by: INTERNAL MEDICINE

## 2024-10-17 PROCEDURE — 6360000002 HC RX W HCPCS: Performed by: NEUROMUSCULOSKELETAL MEDICINE & OMM

## 2024-10-17 PROCEDURE — 2580000003 HC RX 258: Performed by: NEUROMUSCULOSKELETAL MEDICINE & OMM

## 2024-10-17 PROCEDURE — 2500000003 HC RX 250 WO HCPCS: Performed by: STUDENT IN AN ORGANIZED HEALTH CARE EDUCATION/TRAINING PROGRAM

## 2024-10-17 PROCEDURE — 6360000002 HC RX W HCPCS: Performed by: NURSE PRACTITIONER

## 2024-10-17 PROCEDURE — 36415 COLL VENOUS BLD VENIPUNCTURE: CPT

## 2024-10-17 PROCEDURE — 6360000002 HC RX W HCPCS: Performed by: INTERNAL MEDICINE

## 2024-10-17 PROCEDURE — APPSS30 APP SPLIT SHARED TIME 16-30 MINUTES

## 2024-10-17 RX ORDER — HYDRALAZINE HYDROCHLORIDE 20 MG/ML
5 INJECTION INTRAMUSCULAR; INTRAVENOUS
Status: COMPLETED | OUTPATIENT
Start: 2024-10-17 | End: 2024-10-17

## 2024-10-17 RX ORDER — LEVETIRACETAM 500 MG/5ML
500 INJECTION, SOLUTION, CONCENTRATE INTRAVENOUS EVERY 12 HOURS
Status: DISCONTINUED | OUTPATIENT
Start: 2024-10-17 | End: 2024-10-18

## 2024-10-17 RX ORDER — DEXTROSE MONOHYDRATE, SODIUM CHLORIDE, AND POTASSIUM CHLORIDE 50; 1.49; 4.5 G/1000ML; G/1000ML; G/1000ML
INJECTION, SOLUTION INTRAVENOUS CONTINUOUS
Status: DISCONTINUED | OUTPATIENT
Start: 2024-10-17 | End: 2024-10-20

## 2024-10-17 RX ADMIN — LEVETIRACETAM 500 MG: 100 INJECTION INTRAVENOUS at 20:48

## 2024-10-17 RX ADMIN — VALPROATE SODIUM 250 MG: 100 INJECTION, SOLUTION INTRAVENOUS at 00:32

## 2024-10-17 RX ADMIN — SODIUM CHLORIDE: 4.5 INJECTION, SOLUTION INTRAVENOUS at 13:24

## 2024-10-17 RX ADMIN — ENOXAPARIN SODIUM 40 MG: 100 INJECTION SUBCUTANEOUS at 10:12

## 2024-10-17 RX ADMIN — LACOSAMIDE 200 MG: 10 INJECTION INTRAVENOUS at 21:03

## 2024-10-17 RX ADMIN — LEVETIRACETAM 750 MG: 100 INJECTION INTRAVENOUS at 09:29

## 2024-10-17 RX ADMIN — Medication 10 ML: at 09:30

## 2024-10-17 RX ADMIN — LABETALOL HYDROCHLORIDE 10 MG: 5 INJECTION, SOLUTION INTRAVENOUS at 00:27

## 2024-10-17 RX ADMIN — POTASSIUM CHLORIDE, DEXTROSE MONOHYDRATE AND SODIUM CHLORIDE: 150; 5; 450 INJECTION, SOLUTION INTRAVENOUS at 15:25

## 2024-10-17 RX ADMIN — LABETALOL HYDROCHLORIDE 10 MG: 5 INJECTION, SOLUTION INTRAVENOUS at 19:26

## 2024-10-17 RX ADMIN — LABETALOL HYDROCHLORIDE 10 MG: 5 INJECTION, SOLUTION INTRAVENOUS at 15:21

## 2024-10-17 RX ADMIN — VALPROATE SODIUM 250 MG: 100 INJECTION, SOLUTION INTRAVENOUS at 19:24

## 2024-10-17 RX ADMIN — LACOSAMIDE 200 MG: 10 INJECTION INTRAVENOUS at 09:35

## 2024-10-17 RX ADMIN — HYDRALAZINE HYDROCHLORIDE 5 MG: 20 INJECTION INTRAMUSCULAR; INTRAVENOUS at 21:27

## 2024-10-17 RX ADMIN — VALPROATE SODIUM 250 MG: 100 INJECTION, SOLUTION INTRAVENOUS at 10:12

## 2024-10-17 ASSESSMENT — PAIN SCALES - GENERAL
PAINLEVEL_OUTOF10: 0

## 2024-10-17 ASSESSMENT — PAIN SCALES - WONG BAKER: WONGBAKER_NUMERICALRESPONSE: NO HURT

## 2024-10-17 NOTE — PROGRESS NOTES
Palliative Care:        Follow up meeting with  at bedside. Patient remains drowsy and minimally responsive. Education provided on hospice philosophy.  expressing anticipatory grief of patient overall poor prognosis. Remains hopeful that patient may become more alert over next several days. Neurology adjusting seizure medications today.     Per  he is in agreement to initiate hospice conversations and has preference of [facility] as they come to his home area in [place]. Consulted with Dr. Roman and obtained hospice consult order. Intake information faxed and called to Admissions ([place]) as of 1500.  Phone: [phone]  Fax: [phone]    Additional resource information for community support regarding potential private CG needs submitted to:   Enio Rivera CSA, CDP  Certified   Certified Dementia Practitioner  Phone: [phone]  Email: [email or web address]  [email or web address]    Palliative team will continue to follow as needed. Contact information provided on patient communication board. CM and Nurse updated of these discussions.     Electronically signed by Ayla Allison RN, BSN, CHPN (Palliative Care) [phone]

## 2024-10-17 NOTE — PLAN OF CARE
Problem: Discharge Planning  Goal: Discharge to home or other facility with appropriate resources  Outcome: Progressing     Problem: Chronic Conditions and Co-morbidities  Goal: Patient's chronic conditions and co-morbidity symptoms are monitored and maintained or improved  Outcome: Progressing     Problem: Skin/Tissue Integrity  Goal: Absence of new skin breakdown  Description: 1.  Monitor for areas of redness and/or skin breakdown  2.  Assess vascular access sites hourly  3.  Every 4-6 hours minimum:  Change oxygen saturation probe site  4.  Every 4-6 hours:  If on nasal continuous positive airway pressure, respiratory therapy assess nares and determine need for appliance change or resting period.  Outcome: Progressing     Problem: ABCDS Injury Assessment  Goal: Absence of physical injury  Outcome: Progressing     Problem: Safety - Adult  Goal: Free from fall injury  Outcome: Progressing     Problem: Pain  Goal: Verbalizes/displays adequate comfort level or baseline comfort level  Outcome: Progressing     Problem: Decision Making  Goal: Pt/Family able to effectively weigh alternatives and participate in decision making related to treatment and care  Description: INTERVENTIONS:  1. Determine when there are differences between patient's view, family's view, and healthcare provider's view of condition  2. Facilitate patient and family articulation of goals for care  3. Help patient and family identify pros/cons of alternative solutions  4. Provide information as requested by patient/family  5. Respect patient/family right to receive or not to receive information  6. Serve as a liaison between patient and family and health care team  7. Initiate Consults from Ethics, Palliative Care or initiate Family Care Conference as is appropriate  Outcome: Progressing  Flowsheets (Taken 10/16/2024 2015)  Patient/family able to effectively weigh alternatives and participate in decision making related to treatment and care:

## 2024-10-17 NOTE — PROCEDURES
EEG PROCEDURE NOTE    Date: 10/17/2024   Name: Rita Dixon  YOB: 1955    Account #: [id number]  EEG awake and asleep    Date/Time: 10/17/2024 12:41 PM    Performed by: Alice Fishman MD  Authorized by: CECILIA Gomez APRN - CNP  Patient tolerance: patient tolerated the procedure well with no immediate complications  Comments: EEG PROCEDURE NOTE    Date: 10/17/2024   Name: Rita Dixon  YOB: 1955    Account #: [id number]  EEG awake and asleep    Date/Time: 10/17/2024 12:41 PM    Performed by: Alice Fishman MD  Authorized by: CECILIA Gomez APRN - CNP      Test date: 10/16/2024  Technician: ERIK  Duration: 26 Minutes     History:     68 years old lady with history of recurrent seizures since September, 2023 and a diagnosis of the GBM right temporal lobe status post surgical resection December followed by another surgery this past September for recurrence of tumor.  With postop complications of subdural hematoma and ICH and frequent breakthrough seizures and significant encephalopathy.  Patient currently on IV lacosamide, Keppra and valproic acid.     Method:     This 20 channel  digital EEG was performed at bedside utilizing the international 10/20 of electrode placements of both referential and bipolar montages.       The background of the EEG is showing severe generalized bilateral slowing in the range of delta activity 3-4 HZ and amplitude of 20 UV.  This was symmetric in both hemispheres without significant focal slowing.   No definite spike or sharp waves were seen and no evidence of electrical status     Photic stimulation was performed at various flash frequencies and did not evoke any significant posterior driving response.  Hyperventilation was not done .     ECG lead showing a regular rhythm.     Impression:     Abnormal EEG .    Significant bilateral slowing consistent with severe encephalopathy or a postictal state.     No epileptiform discharges was seen throughout

## 2024-10-17 NOTE — PLAN OF CARE
Problem: Discharge Planning  Goal: Discharge to home or other facility with appropriate resources  10/17/2024 1041 by Marge López  Outcome: Progressing  10/17/2024 0504 by Staci Shields RN  Outcome: Progressing     Problem: Chronic Conditions and Co-morbidities  Goal: Patient's chronic conditions and co-morbidity symptoms are monitored and maintained or improved  10/17/2024 1041 by Marge López  Outcome: Progressing  10/17/2024 0504 by Staci Shields RN  Outcome: Progressing     Problem: Skin/Tissue Integrity  Goal: Absence of new skin breakdown  Description: 1.  Monitor for areas of redness and/or skin breakdown  2.  Assess vascular access sites hourly  3.  Every 4-6 hours minimum:  Change oxygen saturation probe site  4.  Every 4-6 hours:  If on nasal continuous positive airway pressure, respiratory therapy assess nares and determine need for appliance change or resting period.  10/17/2024 1041 by Marge López  Outcome: Progressing  10/17/2024 0504 by Staci Shields RN  Outcome: Progressing     Problem: ABCDS Injury Assessment  Goal: Absence of physical injury  10/17/2024 1041 by Marge López  Outcome: Progressing  10/17/2024 0504 by Staci Shields RN  Outcome: Progressing     Problem: Safety - Adult  Goal: Free from fall injury  10/17/2024 1041 by Marge López  Outcome: Progressing  10/17/2024 0504 by Staci Shields RN  Outcome: Progressing     Problem: Pain  Goal: Verbalizes/displays adequate comfort level or baseline comfort level  10/17/2024 1041 by Marge López  Outcome: Progressing  10/17/2024 0504 by Staci Shields RN  Outcome: Progressing     Problem: Decision Making  Goal: Pt/Family able to effectively weigh alternatives and participate in decision making related to treatment and care  Description: INTERVENTIONS:  1. Determine when there are differences between patient's view, family's view, and healthcare provider's view of condition  2. Facilitate patient and family  articulation of goals for care  3. Help patient and family identify pros/cons of alternative solutions  4. Provide information as requested by patient/family  5. Respect patient/family right to receive or not to receive information  6. Serve as a liaison between patient and family and health care team  7. Initiate Consults from Ethics, Palliative Care or initiate Family Care Conference as is appropriate  10/17/2024 1041 by Marge López  Outcome: Progressing  10/17/2024 0504 by Staci Shields RN  Outcome: Progressing  Flowsheets (Taken 10/16/2024 2015)  Patient/family able to effectively weigh alternatives and participate in decision making related to treatment and care: Determine when there are differences between patient's view, family's view, and healthcare provider's view of condition

## 2024-10-17 NOTE — CARE COORDINATION
Chart reviewed and evaluated.  Palliative Care note from 10/16 states Hospice may be consulted.  ARU to sign off.  If patient becomes alert and able to participate in therapy and wishes to continue ARU stay please submit another referral.    Dr. VERONA Mueller updated.       ARU will sign off.     DIEGO ResendizN, .[phone]

## 2024-10-17 NOTE — PROGRESS NOTES
.          Hospitalist Progress Note      PCP: Unknown, Provider    Date of Admission: 10/12/2024    LOS: 5    Chief Complaint: No chief complaint on file.      Case Summary:    68 y.o. female with history of hypertension, seizure disorder, recent craniotomy for glioblastoma, recent right basal ganglia and coronary radiata CVA, seizure disorder who was rehabilitating in ARU , admitted for seizure episode, was recovering and then had another episode of focal seizure .  CT head done noted for high density hemorrhage of the right temporal lobe with a trace amount of intraventricular hemorrhage and an known hypodensity in the anterior right frontal, right basal ganglia and centrum semiovale's of suggestive of recent infarct.  The last MRI done at  9/29/2024 noted for the known right basal ganglia and corona radiata CVA, hemorrhage within the right temporal resection cavity and trace intraventricular hemorrhage.  These findings were discussed with neurosurgery at  as well as our neurology.  Neurosurgery felt the neurosurgical imaging had not worsened.  10/15 had another focal seizure episodes, seizure meds were adjusted and patient has been somnolent since then  Active Hospital Problems    Diagnosis Date Noted    Acute repetitive seizure (HCC) [G40.909] 10/15/2024    Subdural hematoma due to concussion [S06.5XAA] 10/13/2024    Localization-related focal epilepsy with complex partial seizures (HCC) [G40.209] 10/13/2024    Hemiparesis due to recent cerebrovascular accident (CVA) (HCC) [I69.359] 10/13/2024    AMS (altered mental status) [R41.82] 10/12/2024    Urinary retention [R33.9] 10/12/2024    Status post craniotomy [Z98.890] 10/12/2024    History of seizure [Z87.898] 10/12/2024    GBM (glioblastoma multiforme) (HCC) [C71.9] 10/12/2024    Acute cerebrovascular accident (CVA) due to ischemia (HCC) [I63.9] 10/02/2024         Principal Problem:  seizures disorder: Patient had altered mentation at rehab.  CT head  DNR-CCA    Dispo: Medically stable for discharge to rehab pending pre-CERT    ____________________________________________________________________________    Subjective:   Overnight Events:   Remains somnolent but does respond briefly to voice, and as well as touch    Physical Exam:  BP (!) 160/88   Pulse 85   Temp 97.4 °F (36.3 °C) (Axillary)   Resp 18   Ht 1.524 m (5')   Wt 53.7 kg (118 lb 6.4 oz)   SpO2 98%   BMI 23.12 kg/m²   General appearance: Somnolent   Neck: Supple, no thyromegally. No jugular venous distention.   Respiratory:  Normal respiratory effort. Clear to auscultation, no Rales/Wheezes/Rhonchi.  Cardiovascular: S1/S2 without murmurs, rubs or gallops. RRR  Abdomen: Soft, non-tender, non-distended, bowel sounds present.  Musculoskeletal: No clubbing, cyanosis or edema bilaterally.    Skin: Skin color, texture, turgor normal.  No rashes or lesions.  Neurologic:  Cranial nerves: II-XII intact, DENIA.  Left hemiplegia        Intake/Output Summary (Last 24 hours) at 10/17/2024 1443  Last data filed at 10/17/2024 1052  Gross per 24 hour   Intake --   Output 400 ml   Net -400 ml       Labs:   Recent Labs     10/15/24  1024   WBC 4.3   HGB 11.4*   HCT 34.4*         Recent Labs     10/15/24  1024      K 3.8      CO2 24   BUN 29*   CREATININE 0.7   CALCIUM 9.6     No results for input(s): \"CKTOTAL\", \"TROPONINI\" in the last 72 hours.    Urinalysis:    Lab Results   Component Value Date/Time    NITRU POSITIVE 10/12/2024 04:46 PM    WBCUA 4 10/12/2024 04:46 PM    BACTERIA 4+ 10/12/2024 04:46 PM    RBCUA 3 10/12/2024 04:46 PM    BLOODU TRACE 10/12/2024 04:46 PM    GLUCOSEU Negative 10/12/2024 04:46 PM       Radiology:  CT HEAD WO CONTRAST   Final Result   Stable intraventricular hemorrhage.  The previously noted area of hemorrhage   in the right parietooccipital region appears to be in the atrium of the right   lateral ventricle.      Stable postoperative change involving the right

## 2024-10-17 NOTE — PROGRESS NOTES
Rita Destiny  Neurology Follow-up  [facility]    Date of Service: 10/17/2024    Subjective:   CC: Follow up today regarding: Acute encephalopathy    Events noted. Chart and lab reviewed.  Patient seen this morning, family at bedside.  Patient seen slightly more arousable compared to yesterday.  She opens eyes to voice.  Slow to respond and follow some directions but not consistently.  No seizure activity overnight.  Depakote level was 66.  Repeat CT head yesterday showed stable findings. EEG showed diffuse slowing but no epileptiform discharges.  Other review of systems limited      ROS : A 10-12 system review obtained and updated today and is unremarkable except as mentioned  in my interval history.     Past medical history, social history, medication and family history reviewed.       Objective:  Exam:   Constitutional:   Vitals:    10/17/24 0130 10/17/24 0300 10/17/24 0700 10/17/24 0800   BP: (!) 150/90 (!) 147/88 (!) 161/92    Pulse: 88 88 90    Resp:   16    Temp:  97.9 °F (36.6 °C) 98.2 °F (36.8 °C)    TempSrc:  Axillary Axillary    SpO2:  98% 98%    Weight:    53.7 kg (118 lb 6.4 oz)   Height:           General appearance: Lethargic  Mental Status:   AAO times one  Attention and concentration: poor, waxing and waning.  Open eyes to voice.  Can follow some directions but not consistently.  Language: Cannot be assessed  Recent memory: Cannot be assessed  Remote memory: Cannot be assessed  Cannot assess fund of knowledge  Cranial Nerves:   II: Pupils: equal, round, reactive to light, sluggish  III,IV,VI: No gaze preference. No nystagmus  V: Facial sensation: Left facial asymmetry  VII: Facial strength and movements: Cannot be assessed  XII: Tongue movements : Cannot be assessed  Musculoskeletal: Able to move right upper and lower extremity to noxious stimuli.  Same left side weakness, able to move right side extremity.  Tone: Normal tone.  No rigidity.  Reflexes: symmetric 2+ in both arms and

## 2024-10-17 NOTE — PROGRESS NOTES
Physical/Occupational Therapy  Rita Dixon    Attempted PT/OT treatment this date. Family in meeting with palliative care regarding hospice services. Will re-attempt treatment session as schedule allows.     Tete Haddad PT, DPT [id number]   Rolanda Farmer, OTR/L, [id number]

## 2024-10-18 LAB
ANION GAP SERPL CALCULATED.3IONS-SCNC: 10 MMOL/L (ref 3–16)
BUN SERPL-MCNC: 19 MG/DL (ref 7–20)
CALCIUM SERPL-MCNC: 9.3 MG/DL (ref 8.3–10.6)
CHLORIDE SERPL-SCNC: 103 MMOL/L (ref 99–110)
CO2 SERPL-SCNC: 25 MMOL/L (ref 21–32)
CREAT SERPL-MCNC: 0.5 MG/DL (ref 0.6–1.2)
GFR SERPLBLD CREATININE-BSD FMLA CKD-EPI: >90 ML/MIN/{1.73_M2}
GLUCOSE SERPL-MCNC: 127 MG/DL (ref 70–99)
POTASSIUM SERPL-SCNC: 3.7 MMOL/L (ref 3.5–5.1)
SODIUM SERPL-SCNC: 138 MMOL/L (ref 136–145)

## 2024-10-18 PROCEDURE — 80048 BASIC METABOLIC PNL TOTAL CA: CPT

## 2024-10-18 PROCEDURE — 2060000000 HC ICU INTERMEDIATE R&B

## 2024-10-18 PROCEDURE — APPSS30 APP SPLIT SHARED TIME 16-30 MINUTES

## 2024-10-18 PROCEDURE — 36415 COLL VENOUS BLD VENIPUNCTURE: CPT

## 2024-10-18 PROCEDURE — APPNB30 APP NON BILLABLE TIME 0-30 MINS

## 2024-10-18 PROCEDURE — 6360000002 HC RX W HCPCS: Performed by: INTERNAL MEDICINE

## 2024-10-18 PROCEDURE — 2500000003 HC RX 250 WO HCPCS: Performed by: STUDENT IN AN ORGANIZED HEALTH CARE EDUCATION/TRAINING PROGRAM

## 2024-10-18 PROCEDURE — 80164 ASSAY DIPROPYLACETIC ACD TOT: CPT

## 2024-10-18 PROCEDURE — C9254 INJECTION, LACOSAMIDE: HCPCS | Performed by: INTERNAL MEDICINE

## 2024-10-18 PROCEDURE — 99231 SBSQ HOSP IP/OBS SF/LOW 25: CPT | Performed by: NEUROMUSCULOSKELETAL MEDICINE & OMM

## 2024-10-18 PROCEDURE — 6360000002 HC RX W HCPCS: Performed by: NEUROMUSCULOSKELETAL MEDICINE & OMM

## 2024-10-18 PROCEDURE — 2580000003 HC RX 258: Performed by: NEUROMUSCULOSKELETAL MEDICINE & OMM

## 2024-10-18 PROCEDURE — 2500000003 HC RX 250 WO HCPCS: Performed by: NEUROMUSCULOSKELETAL MEDICINE & OMM

## 2024-10-18 PROCEDURE — 2580000003 HC RX 258: Performed by: INTERNAL MEDICINE

## 2024-10-18 RX ADMIN — VALPROATE SODIUM 250 MG: 100 INJECTION, SOLUTION INTRAVENOUS at 05:26

## 2024-10-18 RX ADMIN — LEVETIRACETAM 500 MG: 100 INJECTION INTRAVENOUS at 09:19

## 2024-10-18 RX ADMIN — POTASSIUM CHLORIDE, DEXTROSE MONOHYDRATE AND SODIUM CHLORIDE: 150; 5; 450 INJECTION, SOLUTION INTRAVENOUS at 22:59

## 2024-10-18 RX ADMIN — LABETALOL HYDROCHLORIDE 10 MG: 5 INJECTION, SOLUTION INTRAVENOUS at 11:54

## 2024-10-18 RX ADMIN — LABETALOL HYDROCHLORIDE 10 MG: 5 INJECTION, SOLUTION INTRAVENOUS at 07:16

## 2024-10-18 RX ADMIN — Medication 10 ML: at 09:19

## 2024-10-18 RX ADMIN — VALPROATE SODIUM 250 MG: 100 INJECTION, SOLUTION INTRAVENOUS at 12:13

## 2024-10-18 RX ADMIN — LACOSAMIDE 200 MG: 10 INJECTION INTRAVENOUS at 21:36

## 2024-10-18 RX ADMIN — POTASSIUM CHLORIDE, DEXTROSE MONOHYDRATE AND SODIUM CHLORIDE: 150; 5; 450 INJECTION, SOLUTION INTRAVENOUS at 06:28

## 2024-10-18 RX ADMIN — LACOSAMIDE 200 MG: 10 INJECTION INTRAVENOUS at 09:45

## 2024-10-18 RX ADMIN — VALPROATE SODIUM 250 MG: 100 INJECTION, SOLUTION INTRAVENOUS at 20:19

## 2024-10-18 RX ADMIN — ENOXAPARIN SODIUM 40 MG: 100 INJECTION SUBCUTANEOUS at 09:19

## 2024-10-18 ASSESSMENT — PAIN SCALES - WONG BAKER: WONGBAKER_NUMERICALRESPONSE: NO HURT

## 2024-10-18 NOTE — PLAN OF CARE
Problem: Discharge Planning  Goal: Discharge to home or other facility with appropriate resources  Outcome: Not Progressing  Flowsheets (Taken 10/18/2024 0000)  Discharge to home or other facility with appropriate resources:   Identify barriers to discharge with patient and caregiver   Arrange for needed discharge resources and transportation as appropriate   Identify discharge learning needs (meds, wound care, etc)   Refer to discharge planning if patient needs post-hospital services based on physician order or complex needs related to functional status, cognitive ability or social support system     Problem: Chronic Conditions and Co-morbidities  Goal: Patient's chronic conditions and co-morbidity symptoms are monitored and maintained or improved  Outcome: Not Progressing  Flowsheets (Taken 10/18/2024 0000)  Care Plan - Patient's Chronic Conditions and Co-Morbidity Symptoms are Monitored and Maintained or Improved:   Monitor and assess patient's chronic conditions and comorbid symptoms for stability, deterioration, or improvement   Collaborate with multidisciplinary team to address chronic and comorbid conditions and prevent exacerbation or deterioration   Update acute care plan with appropriate goals if chronic or comorbid symptoms are exacerbated and prevent overall improvement and discharge     Problem: Skin/Tissue Integrity  Goal: Absence of new skin breakdown  Description: 1.  Monitor for areas of redness and/or skin breakdown  2.  Assess vascular access sites hourly  3.  Every 4-6 hours minimum:  Change oxygen saturation probe site  4.  Every 4-6 hours:  If on nasal continuous positive airway pressure, respiratory therapy assess nares and determine need for appliance change or resting period.  Outcome: Not Progressing     Problem: ABCDS Injury Assessment  Goal: Absence of physical injury  Outcome: Not Progressing     Problem: Safety - Adult  Goal: Free from fall injury  Outcome: Not Progressing

## 2024-10-18 NOTE — PALLIATIVE CARE
Talked to Hermila with [facility] [phone] she talked to spouse he is OK with enrollment into Hospice there is a question of getting equipment from home. She will call Suri SMITH to discuss DC plans

## 2024-10-18 NOTE — PLAN OF CARE
Problem: Discharge Planning  Goal: Discharge to home or other facility with appropriate resources  10/18/2024 0842 by Marge López  Outcome: Progressing  10/18/2024 0602 by Elba Mabry, RN  Outcome: Not Progressing  Flowsheets (Taken 10/18/2024 0000)  Discharge to home or other facility with appropriate resources:   Identify barriers to discharge with patient and caregiver   Arrange for needed discharge resources and transportation as appropriate   Identify discharge learning needs (meds, wound care, etc)   Refer to discharge planning if patient needs post-hospital services based on physician order or complex needs related to functional status, cognitive ability or social support system     Problem: Chronic Conditions and Co-morbidities  Goal: Patient's chronic conditions and co-morbidity symptoms are monitored and maintained or improved  10/18/2024 0842 by Marge López  Outcome: Progressing  10/18/2024 0602 by Elba Mabry, RN  Outcome: Not Progressing  Flowsheets (Taken 10/18/2024 0000)  Care Plan - Patient's Chronic Conditions and Co-Morbidity Symptoms are Monitored and Maintained or Improved:   Monitor and assess patient's chronic conditions and comorbid symptoms for stability, deterioration, or improvement   Collaborate with multidisciplinary team to address chronic and comorbid conditions and prevent exacerbation or deterioration   Update acute care plan with appropriate goals if chronic or comorbid symptoms are exacerbated and prevent overall improvement and discharge     Problem: Skin/Tissue Integrity  Goal: Absence of new skin breakdown  Description: 1.  Monitor for areas of redness and/or skin breakdown  2.  Assess vascular access sites hourly  3.  Every 4-6 hours minimum:  Change oxygen saturation probe site  4.  Every 4-6 hours:  If on nasal continuous positive airway pressure, respiratory therapy assess nares and determine need for appliance change or resting period.  10/18/2024  Progressing  Flowsheets (Taken 10/18/2024 0000)  Patient/family able to effectively weigh alternatives and participate in decision making related to treatment and care:   Determine when there are differences between patient's view, family's view, and healthcare provider's view of condition   Facilitate patient and family articulation of goals for care   Help patient and family identify pros/cons of alternative solutions   Provide information as requested by patient/family   Respect patient/family right to receive or not to receive information   Serve as a liaison between patient and family and health care team   Initiate Consults from Ethics, Palliative Care or initiate Family Care Conference as is appropriate

## 2024-10-18 NOTE — PROGRESS NOTES
Physical/Occupational Therapy  Rita Dixon    Attempted PT/OT treatment this date. Discussed pt's progression of symptoms, hospital stay, and plans for d/c home with hospice services with spouse, Francis. Francis agreeable to PT/OT signing off on pt at this time. Francis agreeable to re-ordering of therapy services should pt's status change.    Tete Haddad PT, DPT [id number]   Rolanda Farmer, OTR/L, [id number]

## 2024-10-18 NOTE — PROGRESS NOTES
.          Hospitalist Progress Note      PCP: Unknown, Provider    Date of Admission: 10/12/2024    LOS: 6    Chief Complaint: No chief complaint on file.      Case Summary:    68 y.o. female with history of hypertension, seizure disorder, recent craniotomy for glioblastoma, recent right basal ganglia and coronary radiata CVA, seizure disorder who was rehabilitating in ARU , admitted for seizure episode, was recovering and then had another episode of focal seizure .  CT head done noted for high density hemorrhage of the right temporal lobe with a trace amount of intraventricular hemorrhage and an known hypodensity in the anterior right frontal, right basal ganglia and centrum semiovale's of suggestive of recent infarct.  The last MRI done at  9/29/2024 noted for the known right basal ganglia and corona radiata CVA, hemorrhage within the right temporal resection cavity and trace intraventricular hemorrhage.  These findings were discussed with neurosurgery at  as well as our neurology.  Neurosurgery felt the neurosurgical imaging had not worsened.  10/15 had another focal seizure episodes, seizure meds were adjusted and patient has been somnolent since then  Active Hospital Problems    Diagnosis Date Noted    Acute repetitive seizure (HCC) [G40.909] 10/15/2024    Subdural hematoma due to concussion [S06.5XAA] 10/13/2024    Localization-related focal epilepsy with complex partial seizures (HCC) [G40.209] 10/13/2024    Hemiparesis due to recent cerebrovascular accident (CVA) (HCC) [I69.359] 10/13/2024    AMS (altered mental status) [R41.82] 10/12/2024    Urinary retention [R33.9] 10/12/2024    Status post craniotomy [Z98.890] 10/12/2024    History of seizure [Z87.898] 10/12/2024    GBM (glioblastoma multiforme) (HCC) [C71.9] 10/12/2024    Acute cerebrovascular accident (CVA) due to ischemia (HCC) [I63.9] 10/02/2024         Principal Problem:  seizures disorder: Patient had altered mentation at rehab.  CT head  temporal pole.      Stable hemorrhage in the medial aspect of the right middle cranial fossa.      Interval development of a pseudomeningocele peripheral to the craniotomy site.         CT HEAD WO CONTRAST   Final Result   Stable 5 mm hemorrhage right posterior parietooccipital region      Stable hemorrhage medial aspect right temporal lobe                 Juan C Roman MD      Please excuse brevity and/or typos. This report was transcribed using voice recognition software. Every effort was made to ensure accuracy, however, inadvertent computerized transcription errors may be present.

## 2024-10-18 NOTE — PROGRESS NOTES
Rita Gates  Neurology Follow-up  [facility]    Date of Service: 10/18/2024    Subjective:   CC: Follow up today regarding: Acute encephalopathy    Events noted. Chart and lab reviewed.  Patient seen this morning, family at bedside.  Continues to wax and wane.  More arousable compared to yesterday.  Able to follow some directions more than yesterday.  No seizures overnight.      ROS : A 10-12 system review obtained and updated today and is unremarkable except as mentioned  in my interval history.     Past medical history, social history, medication and family history reviewed.       Objective:  Exam:   Constitutional:   Vitals:    10/17/24 2330 10/18/24 0000 10/18/24 0351 10/18/24 0713   BP: (!) 165/71  (!) 158/75 (!) 163/83   Pulse: 81 82 82 84   Resp: 18 18 17   Temp: 98.4 °F (36.9 °C)  97.9 °F (36.6 °C) 97.8 °F (36.6 °C)   TempSrc: Axillary  Temporal Axillary   SpO2: 97%  97% 100%   Weight:       Height:         General appearance: Lethargic  Mental Status:   AAO times one  Attention and concentration: poor, waxing and waning.  Open eyes to voice.  Can follow some directions, not consistently  Language: Cannot be assessed  Recent memory: Cannot be assessed  Remote memory: Cannot be assessed  Cannot assess fund of knowledge  Cranial Nerves:   II: Pupils: equal, round, reactive to light, bilaterally  III,IV,VI: No gaze preference. No nystagmus  V: Facial sensation: Left facial asymmetry  VII: Facial strength and movements: Cannot be assessed  XII: Tongue movements : Cannot be assessed  Musculoskeletal: Spontaneous movement of the right side extremities.  Strong right hand  and able to move right side toes to command.  Same left side weakness  Tone: Normal tone.  No rigidity.  Reflexes: symmetric 2+ in both arms and legs  Coordination: No abnormal movements  Sensation: No abnormal sensory deficit  Gait: Cannot be assessed due to patient condition    MDM:      A. Problems (any  1)    High:    [x] Acute/Chronic Illness/injury posing threat to life or bodily function:    [] Severe exacerbation of chronic illness:      Moderate:    []     1 or more chronic illness with exacerbation, progression or side effect of treatment or  []     2 or more stable chronic illnesses or  []     1 acute illness with systemic symptoms     ---------------------------------------------------------------------  B. Risk of Treatment (any 1)   [x] Drugs/treatments that require intensive monitoring for toxicity include:    [] Change in code status:    [] Decision to escalate care:    [] Major surgery/procedure with associated risk factors:    [x] Prescription drug management  ----------------------------------------------------------------------  [x] High (any 2)  [] Moderate (any 1)    C. Data (any 2 for high and any 1 for moderate)  [x] Discussed management of the case with: Family  [x] Imaging personally reviewed and interpreted, includes:   [x] Data Review (any 3)  [x] Collateral history obtained from: Family  [x] All available Consultant notes from yesterday/today were reviewed  [x] All current labs were reviewed and interpreted for clinical significance   [x] Appropriate follow-up labs were ordered      Data  LABS:   Lab Results   Component Value Date/Time     10/18/2024 05:14 AM    K 3.7 10/18/2024 05:14 AM    K 3.8 10/15/2024 10:24 AM     10/18/2024 05:14 AM    CO2 25 10/18/2024 05:14 AM    BUN 19 10/18/2024 05:14 AM    CREATININE 0.5 10/18/2024 05:14 AM    LABGLOM >90 10/18/2024 05:14 AM    LABGLOM >60 09/26/2023 11:13 AM    GLUCOSE 127 10/18/2024 05:14 AM    MG 1.80 09/26/2023 11:13 AM    CALCIUM 9.3 10/18/2024 05:14 AM     Lab Results   Component Value Date/Time    WBC 4.3 10/15/2024 10:24 AM    RBC 3.37 10/15/2024 10:24 AM    HGB 11.4 10/15/2024 10:24 AM    HCT 34.4 10/15/2024 10:24 AM    .2 10/15/2024 10:24 AM    RDW 14.4 10/15/2024 10:24 AM     10/15/2024 10:24 AM     Lab

## 2024-10-19 ENCOUNTER — APPOINTMENT (OUTPATIENT)
Dept: GENERAL RADIOLOGY | Age: 69
End: 2024-10-19
Attending: INTERNAL MEDICINE
Payer: COMMERCIAL

## 2024-10-19 LAB
VALPROATE SERPL-MCNC: 50 UG/ML (ref 50–100)
VALPROATE SERPL-MCNC: 64 UG/ML (ref 50–100)

## 2024-10-19 PROCEDURE — 2500000003 HC RX 250 WO HCPCS: Performed by: STUDENT IN AN ORGANIZED HEALTH CARE EDUCATION/TRAINING PROGRAM

## 2024-10-19 PROCEDURE — C9254 INJECTION, LACOSAMIDE: HCPCS | Performed by: INTERNAL MEDICINE

## 2024-10-19 PROCEDURE — 6370000000 HC RX 637 (ALT 250 FOR IP): Performed by: STUDENT IN AN ORGANIZED HEALTH CARE EDUCATION/TRAINING PROGRAM

## 2024-10-19 PROCEDURE — 80164 ASSAY DIPROPYLACETIC ACD TOT: CPT

## 2024-10-19 PROCEDURE — 36415 COLL VENOUS BLD VENIPUNCTURE: CPT

## 2024-10-19 PROCEDURE — 6360000002 HC RX W HCPCS: Performed by: NEUROMUSCULOSKELETAL MEDICINE & OMM

## 2024-10-19 PROCEDURE — 2580000003 HC RX 258: Performed by: INTERNAL MEDICINE

## 2024-10-19 PROCEDURE — 74018 RADEX ABDOMEN 1 VIEW: CPT

## 2024-10-19 PROCEDURE — 2500000003 HC RX 250 WO HCPCS: Performed by: NEUROMUSCULOSKELETAL MEDICINE & OMM

## 2024-10-19 PROCEDURE — 6370000000 HC RX 637 (ALT 250 FOR IP): Performed by: INTERNAL MEDICINE

## 2024-10-19 PROCEDURE — 2580000003 HC RX 258: Performed by: STUDENT IN AN ORGANIZED HEALTH CARE EDUCATION/TRAINING PROGRAM

## 2024-10-19 PROCEDURE — 2060000000 HC ICU INTERMEDIATE R&B

## 2024-10-19 PROCEDURE — 6360000002 HC RX W HCPCS: Performed by: INTERNAL MEDICINE

## 2024-10-19 PROCEDURE — 6360000002 HC RX W HCPCS: Performed by: STUDENT IN AN ORGANIZED HEALTH CARE EDUCATION/TRAINING PROGRAM

## 2024-10-19 PROCEDURE — 2580000003 HC RX 258: Performed by: NEUROMUSCULOSKELETAL MEDICINE & OMM

## 2024-10-19 RX ORDER — LIDOCAINE HYDROCHLORIDE 20 MG/ML
JELLY TOPICAL ONCE
Status: COMPLETED | OUTPATIENT
Start: 2024-10-19 | End: 2024-10-19

## 2024-10-19 RX ORDER — LIDOCAINE HYDROCHLORIDE 20 MG/ML
JELLY TOPICAL ONCE
Status: DISCONTINUED | OUTPATIENT
Start: 2024-10-19 | End: 2024-10-19

## 2024-10-19 RX ORDER — OXYMETAZOLINE HYDROCHLORIDE 0.05 G/100ML
2 SPRAY NASAL ONCE
Status: COMPLETED | OUTPATIENT
Start: 2024-10-19 | End: 2024-10-19

## 2024-10-19 RX ADMIN — ATORVASTATIN CALCIUM 40 MG: 40 TABLET, FILM COATED ORAL at 21:19

## 2024-10-19 RX ADMIN — LABETALOL HYDROCHLORIDE 10 MG: 5 INJECTION, SOLUTION INTRAVENOUS at 04:37

## 2024-10-19 RX ADMIN — WATER 40 MG: 1 INJECTION INTRAMUSCULAR; INTRAVENOUS; SUBCUTANEOUS at 13:41

## 2024-10-19 RX ADMIN — LACOSAMIDE 200 MG: 10 INJECTION INTRAVENOUS at 21:49

## 2024-10-19 RX ADMIN — LIDOCAINE HYDROCHLORIDE: 20 JELLY TOPICAL at 13:04

## 2024-10-19 RX ADMIN — LABETALOL HYDROCHLORIDE 10 MG: 5 INJECTION, SOLUTION INTRAVENOUS at 13:08

## 2024-10-19 RX ADMIN — OXYMETAZOLINE HYDROCHLORIDE 2 SPRAY: 0.5 SPRAY NASAL at 13:04

## 2024-10-19 RX ADMIN — VALPROATE SODIUM 250 MG: 100 INJECTION, SOLUTION INTRAVENOUS at 03:25

## 2024-10-19 RX ADMIN — POTASSIUM CHLORIDE, DEXTROSE MONOHYDRATE AND SODIUM CHLORIDE: 150; 5; 450 INJECTION, SOLUTION INTRAVENOUS at 15:23

## 2024-10-19 RX ADMIN — ENOXAPARIN SODIUM 40 MG: 100 INJECTION SUBCUTANEOUS at 07:39

## 2024-10-19 RX ADMIN — LACOSAMIDE 200 MG: 10 INJECTION INTRAVENOUS at 10:09

## 2024-10-19 RX ADMIN — VALPROATE SODIUM 250 MG: 100 INJECTION, SOLUTION INTRAVENOUS at 11:36

## 2024-10-19 RX ADMIN — METOPROLOL SUCCINATE 50 MG: 50 TABLET, EXTENDED RELEASE ORAL at 21:19

## 2024-10-19 RX ADMIN — LABETALOL HYDROCHLORIDE 10 MG: 5 INJECTION, SOLUTION INTRAVENOUS at 19:26

## 2024-10-19 NOTE — PLAN OF CARE
Problem: Discharge Planning  Goal: Discharge to home or other facility with appropriate resources  Outcome: Not Progressing  Flowsheets (Taken 10/18/2024 1945)  Discharge to home or other facility with appropriate resources:   Identify barriers to discharge with patient and caregiver   Arrange for needed discharge resources and transportation as appropriate   Identify discharge learning needs (meds, wound care, etc)   Refer to discharge planning if patient needs post-hospital services based on physician order or complex needs related to functional status, cognitive ability or social support system     Problem: Chronic Conditions and Co-morbidities  Goal: Patient's chronic conditions and co-morbidity symptoms are monitored and maintained or improved  Outcome: Not Progressing  Flowsheets (Taken 10/18/2024 1945)  Care Plan - Patient's Chronic Conditions and Co-Morbidity Symptoms are Monitored and Maintained or Improved:   Monitor and assess patient's chronic conditions and comorbid symptoms for stability, deterioration, or improvement   Collaborate with multidisciplinary team to address chronic and comorbid conditions and prevent exacerbation or deterioration   Update acute care plan with appropriate goals if chronic or comorbid symptoms are exacerbated and prevent overall improvement and discharge     Problem: Skin/Tissue Integrity  Goal: Absence of new skin breakdown  Description: 1.  Monitor for areas of redness and/or skin breakdown  2.  Assess vascular access sites hourly  3.  Every 4-6 hours minimum:  Change oxygen saturation probe site  4.  Every 4-6 hours:  If on nasal continuous positive airway pressure, respiratory therapy assess nares and determine need for appliance change or resting period.  Outcome: Not Progressing     Problem: ABCDS Injury Assessment  Goal: Absence of physical injury  Outcome: Not Progressing     Problem: Safety - Adult  Goal: Free from fall injury  Outcome: Not Progressing

## 2024-10-19 NOTE — PROGRESS NOTES
.          Hospitalist Progress Note      PCP: Unknown, Provider    Date of Admission: 10/12/2024    LOS: 7    Chief Complaint: No chief complaint on file.      Case Summary:    68 y.o. female with history of hypertension, seizure disorder, recent craniotomy for glioblastoma, recent right basal ganglia and coronary radiata CVA, seizure disorder who was rehabilitating in ARU , admitted for seizure episode, was recovering and then had another episode of focal seizure .  CT head done noted for high density hemorrhage of the right temporal lobe with a trace amount of intraventricular hemorrhage and an known hypodensity in the anterior right frontal, right basal ganglia and centrum semiovale's of suggestive of recent infarct.  The last MRI done at  9/29/2024 noted for the known right basal ganglia and corona radiata CVA, hemorrhage within the right temporal resection cavity and trace intraventricular hemorrhage.  These findings were discussed with neurosurgery at  as well as our neurology.  Neurosurgery felt the neurosurgical imaging had not worsened.  10/15 had another focal seizure episodes, seizure meds were adjusted and patient has been somnolent since then  Active Hospital Problems    Diagnosis Date Noted    Acute repetitive seizure (HCC) [G40.909] 10/15/2024    Subdural hematoma due to concussion [S06.5XAA] 10/13/2024    Localization-related focal epilepsy with complex partial seizures (HCC) [G40.209] 10/13/2024    Hemiparesis due to recent cerebrovascular accident (CVA) (HCC) [I69.359] 10/13/2024    AMS (altered mental status) [R41.82] 10/12/2024    Urinary retention [R33.9] 10/12/2024    Status post craniotomy [Z98.890] 10/12/2024    History of seizure [Z87.898] 10/12/2024    GBM (glioblastoma multiforme) (HCC) [C71.9] 10/12/2024    Acute cerebrovascular accident (CVA) due to ischemia (HCC) [I63.9] 10/02/2024         Principal Problem:  seizures disorder: Patient had altered mentation at rehab.  CT head

## 2024-10-19 NOTE — PROGRESS NOTES
Pt developing rash on face/mouth/neck. No new meds given. MD made aware    BP also elevated at 180/79, HR 99. PRN labetalol given as ordered

## 2024-10-20 LAB
ALBUMIN SERPL-MCNC: 2.9 G/DL (ref 3.4–5)
ALP SERPL-CCNC: 60 U/L (ref 40–129)
ALT SERPL-CCNC: 12 U/L (ref 10–40)
ANION GAP SERPL CALCULATED.3IONS-SCNC: 12 MMOL/L (ref 3–16)
AST SERPL-CCNC: 16 U/L (ref 15–37)
BILIRUB DIRECT SERPL-MCNC: 0.3 MG/DL (ref 0–0.3)
BILIRUB INDIRECT SERPL-MCNC: 0.3 MG/DL (ref 0–1)
BILIRUB SERPL-MCNC: 0.6 MG/DL (ref 0–1)
BUN SERPL-MCNC: 12 MG/DL (ref 7–20)
CALCIUM SERPL-MCNC: 9.7 MG/DL (ref 8.3–10.6)
CHLORIDE SERPL-SCNC: 102 MMOL/L (ref 99–110)
CO2 SERPL-SCNC: 24 MMOL/L (ref 21–32)
CREAT SERPL-MCNC: 0.5 MG/DL (ref 0.6–1.2)
DEPRECATED RDW RBC AUTO: 14 % (ref 12.4–15.4)
GFR SERPLBLD CREATININE-BSD FMLA CKD-EPI: >90 ML/MIN/{1.73_M2}
GLUCOSE BLD-MCNC: 174 MG/DL (ref 70–99)
GLUCOSE SERPL-MCNC: 133 MG/DL (ref 70–99)
HCT VFR BLD AUTO: 34.8 % (ref 36–48)
HGB BLD-MCNC: 12 G/DL (ref 12–16)
MAGNESIUM SERPL-MCNC: 2.1 MG/DL (ref 1.8–2.4)
MCH RBC QN AUTO: 34.7 PG (ref 26–34)
MCHC RBC AUTO-ENTMCNC: 34.5 G/DL (ref 31–36)
MCV RBC AUTO: 100.5 FL (ref 80–100)
PERFORMED ON: ABNORMAL
PHOSPHATE SERPL-MCNC: 1.6 MG/DL (ref 2.5–4.9)
PLATELET # BLD AUTO: 150 K/UL (ref 135–450)
PMV BLD AUTO: 6.9 FL (ref 5–10.5)
POTASSIUM SERPL-SCNC: 4 MMOL/L (ref 3.5–5.1)
PREALB SERPL-MCNC: 10.5 MG/DL (ref 20–40)
PROT SERPL-MCNC: 6.5 G/DL (ref 6.4–8.2)
RBC # BLD AUTO: 3.47 M/UL (ref 4–5.2)
SODIUM SERPL-SCNC: 138 MMOL/L (ref 136–145)
TRIGL SERPL-MCNC: 55 MG/DL (ref 0–150)
WBC # BLD AUTO: 5 K/UL (ref 4–11)

## 2024-10-20 PROCEDURE — 84478 ASSAY OF TRIGLYCERIDES: CPT

## 2024-10-20 PROCEDURE — 36415 COLL VENOUS BLD VENIPUNCTURE: CPT

## 2024-10-20 PROCEDURE — 6370000000 HC RX 637 (ALT 250 FOR IP): Performed by: STUDENT IN AN ORGANIZED HEALTH CARE EDUCATION/TRAINING PROGRAM

## 2024-10-20 PROCEDURE — 6370000000 HC RX 637 (ALT 250 FOR IP): Performed by: INTERNAL MEDICINE

## 2024-10-20 PROCEDURE — 6360000002 HC RX W HCPCS: Performed by: STUDENT IN AN ORGANIZED HEALTH CARE EDUCATION/TRAINING PROGRAM

## 2024-10-20 PROCEDURE — 85027 COMPLETE CBC AUTOMATED: CPT

## 2024-10-20 PROCEDURE — 99231 SBSQ HOSP IP/OBS SF/LOW 25: CPT | Performed by: NEUROMUSCULOSKELETAL MEDICINE & OMM

## 2024-10-20 PROCEDURE — 84134 ASSAY OF PREALBUMIN: CPT

## 2024-10-20 PROCEDURE — 80076 HEPATIC FUNCTION PANEL: CPT

## 2024-10-20 PROCEDURE — 2580000003 HC RX 258: Performed by: STUDENT IN AN ORGANIZED HEALTH CARE EDUCATION/TRAINING PROGRAM

## 2024-10-20 PROCEDURE — C9254 INJECTION, LACOSAMIDE: HCPCS | Performed by: INTERNAL MEDICINE

## 2024-10-20 PROCEDURE — 83735 ASSAY OF MAGNESIUM: CPT

## 2024-10-20 PROCEDURE — 2580000003 HC RX 258: Performed by: INTERNAL MEDICINE

## 2024-10-20 PROCEDURE — 80048 BASIC METABOLIC PNL TOTAL CA: CPT

## 2024-10-20 PROCEDURE — 6360000002 HC RX W HCPCS: Performed by: NEUROMUSCULOSKELETAL MEDICINE & OMM

## 2024-10-20 PROCEDURE — 2060000000 HC ICU INTERMEDIATE R&B

## 2024-10-20 PROCEDURE — 6370000000 HC RX 637 (ALT 250 FOR IP): Performed by: NURSE PRACTITIONER

## 2024-10-20 PROCEDURE — 84100 ASSAY OF PHOSPHORUS: CPT

## 2024-10-20 PROCEDURE — 6360000002 HC RX W HCPCS: Performed by: INTERNAL MEDICINE

## 2024-10-20 RX ORDER — VALPROIC ACID 250 MG/5ML
250 SOLUTION ORAL EVERY 8 HOURS
Status: DISCONTINUED | OUTPATIENT
Start: 2024-10-20 | End: 2024-10-23

## 2024-10-20 RX ADMIN — WATER 40 MG: 1 INJECTION INTRAMUSCULAR; INTRAVENOUS; SUBCUTANEOUS at 10:15

## 2024-10-20 RX ADMIN — ACETAMINOPHEN 650 MG: 325 TABLET ORAL at 14:29

## 2024-10-20 RX ADMIN — Medication 10 ML: at 20:56

## 2024-10-20 RX ADMIN — Medication 500 MG: at 20:57

## 2024-10-20 RX ADMIN — Medication 10 ML: at 10:15

## 2024-10-20 RX ADMIN — METOPROLOL SUCCINATE 50 MG: 50 TABLET, EXTENDED RELEASE ORAL at 20:57

## 2024-10-20 RX ADMIN — Medication 500 MG: at 18:00

## 2024-10-20 RX ADMIN — LACOSAMIDE 200 MG: 10 INJECTION INTRAVENOUS at 10:47

## 2024-10-20 RX ADMIN — SODIUM CHLORIDE, PRESERVATIVE FREE 10 ML: 5 INJECTION INTRAVENOUS at 21:06

## 2024-10-20 RX ADMIN — LACOSAMIDE 200 MG: 10 INJECTION INTRAVENOUS at 21:06

## 2024-10-20 RX ADMIN — Medication 500 MG: at 14:30

## 2024-10-20 RX ADMIN — VALPROIC ACID 250 MG: 250 SOLUTION ORAL at 04:29

## 2024-10-20 RX ADMIN — ATORVASTATIN CALCIUM 40 MG: 40 TABLET, FILM COATED ORAL at 20:57

## 2024-10-20 RX ADMIN — VALPROIC ACID 250 MG: 250 SOLUTION ORAL at 20:57

## 2024-10-20 RX ADMIN — VALPROIC ACID 250 MG: 250 SOLUTION ORAL at 14:30

## 2024-10-20 ASSESSMENT — PAIN SCALES - GENERAL: PAINLEVEL_OUTOF10: 0

## 2024-10-20 NOTE — PROGRESS NOTES
Upon assessment, patient noted to have blood coming from left nostril. Patient cleaned up and MD notified. Loss of IV access, DIT called to place another PIV, IV depacon swiched to oral and will use NG for administration per hospitalist.

## 2024-10-20 NOTE — PLAN OF CARE
Problem: Discharge Planning  Goal: Discharge to home or other facility with appropriate resources  10/20/2024 1857 by Haylie Copeland, RN  Outcome: Progressing  10/20/2024 0635 by Elba Mabry, RN  Outcome: Not Progressing  Flowsheets (Taken 10/19/2024 1923)  Discharge to home or other facility with appropriate resources:   Identify barriers to discharge with patient and caregiver   Arrange for needed discharge resources and transportation as appropriate   Identify discharge learning needs (meds, wound care, etc)   Refer to discharge planning if patient needs post-hospital services based on physician order or complex needs related to functional status, cognitive ability or social support system     Problem: Chronic Conditions and Co-morbidities  Goal: Patient's chronic conditions and co-morbidity symptoms are monitored and maintained or improved  10/20/2024 1857 by Haylie Copeland, RN  Outcome: Progressing  10/20/2024 0635 by Elba Mabry, RN  Outcome: Not Progressing  Flowsheets (Taken 10/19/2024 1923)  Care Plan - Patient's Chronic Conditions and Co-Morbidity Symptoms are Monitored and Maintained or Improved:   Monitor and assess patient's chronic conditions and comorbid symptoms for stability, deterioration, or improvement   Collaborate with multidisciplinary team to address chronic and comorbid conditions and prevent exacerbation or deterioration   Update acute care plan with appropriate goals if chronic or comorbid symptoms are exacerbated and prevent overall improvement and discharge     Problem: Skin/Tissue Integrity  Goal: Absence of new skin breakdown  Description: 1.  Monitor for areas of redness and/or skin breakdown  2.  Assess vascular access sites hourly  3.  Every 4-6 hours minimum:  Change oxygen saturation probe site  4.  Every 4-6 hours:  If on nasal continuous positive airway pressure, respiratory therapy assess nares and determine need for appliance change or resting

## 2024-10-20 NOTE — PLAN OF CARE
Problem: Discharge Planning  Goal: Discharge to home or other facility with appropriate resources  Outcome: Not Progressing  Flowsheets (Taken 10/19/2024 1923)  Discharge to home or other facility with appropriate resources:   Identify barriers to discharge with patient and caregiver   Arrange for needed discharge resources and transportation as appropriate   Identify discharge learning needs (meds, wound care, etc)   Refer to discharge planning if patient needs post-hospital services based on physician order or complex needs related to functional status, cognitive ability or social support system     Problem: Chronic Conditions and Co-morbidities  Goal: Patient's chronic conditions and co-morbidity symptoms are monitored and maintained or improved  Outcome: Not Progressing  Flowsheets (Taken 10/19/2024 1923)  Care Plan - Patient's Chronic Conditions and Co-Morbidity Symptoms are Monitored and Maintained or Improved:   Monitor and assess patient's chronic conditions and comorbid symptoms for stability, deterioration, or improvement   Collaborate with multidisciplinary team to address chronic and comorbid conditions and prevent exacerbation or deterioration   Update acute care plan with appropriate goals if chronic or comorbid symptoms are exacerbated and prevent overall improvement and discharge     Problem: Skin/Tissue Integrity  Goal: Absence of new skin breakdown  Description: 1.  Monitor for areas of redness and/or skin breakdown  2.  Assess vascular access sites hourly  3.  Every 4-6 hours minimum:  Change oxygen saturation probe site  4.  Every 4-6 hours:  If on nasal continuous positive airway pressure, respiratory therapy assess nares and determine need for appliance change or resting period.  Outcome: Not Progressing     Problem: ABCDS Injury Assessment  Goal: Absence of physical injury  Outcome: Not Progressing     Problem: Safety - Adult  Goal: Free from fall injury  Outcome: Not Progressing      Problem: Pain  Goal: Verbalizes/displays adequate comfort level or baseline comfort level  Outcome: Not Progressing     Problem: Decision Making  Goal: Pt/Family able to effectively weigh alternatives and participate in decision making related to treatment and care  Description: INTERVENTIONS:  1. Determine when there are differences between patient's view, family's view, and healthcare provider's view of condition  2. Facilitate patient and family articulation of goals for care  3. Help patient and family identify pros/cons of alternative solutions  4. Provide information as requested by patient/family  5. Respect patient/family right to receive or not to receive information  6. Serve as a liaison between patient and family and health care team  7. Initiate Consults from Ethics, Palliative Care or initiate Family Care Conference as is appropriate  Outcome: Not Progressing  Flowsheets (Taken 10/19/2024 1923)  Patient/family able to effectively weigh alternatives and participate in decision making related to treatment and care:   Determine when there are differences between patient's view, family's view, and healthcare provider's view of condition   Facilitate patient and family articulation of goals for care   Help patient and family identify pros/cons of alternative solutions   Provide information as requested by patient/family   Respect patient/family right to receive or not to receive information   Serve as a liaison between patient and family and health care team   Initiate Consults from Ethics, Palliative Care or initiate Family Care Conference as is appropriate

## 2024-10-20 NOTE — FLOWSHEET NOTE
10/20/24 1930   Vital Signs   Temp 97.5 °F (36.4 °C)   Temp Source Temporal   Pulse 98   Heart Rate Source Apical   Respirations 14   /72   MAP (Calculated) 91   BP Location Right upper arm   BP Method Automatic   Patient Position Semi fowlers   Pain Assessment   Pain Assessment None - Denies Pain   Care Plan - Pain Goals   Verbalizes/displays adequate comfort level or baseline comfort level Encourage patient to monitor pain and request assistance;Assess pain using appropriate pain scale;Administer analgesics based on type and severity of pain and evaluate response;Implement non-pharmacological measures as appropriate and evaluate response;Consider cultural and social influences on pain and pain management;Notify Licensed Independent Practitioner if interventions unsuccessful or patient reports new pain   Opioid-Induced Sedation   POSS Score 1   RASS   Garcia Agitation Sedation Scale (RASS) 0   Oxygen Therapy   SpO2 95 %   Pulse Oximeter Device Mode Intermittent   O2 Device None (Room air)   O2 Flow Rate (L/min) 0 L/min     Quiet, stated comfortable and no pain.  No s/s of distress noted, call light within reach.

## 2024-10-20 NOTE — PROGRESS NOTES
.          Hospitalist Progress Note      PCP: Unknown, Provider    Date of Admission: 10/12/2024    LOS: 8    Chief Complaint: No chief complaint on file.      Case Summary:    68 y.o. female with history of hypertension, seizure disorder, recent craniotomy for glioblastoma, recent right basal ganglia and coronary radiata CVA, seizure disorder who was rehabilitating in ARU , admitted for seizure episode, was recovering and then had another episode of focal seizure .  CT head done noted for high density hemorrhage of the right temporal lobe with a trace amount of intraventricular hemorrhage and an known hypodensity in the anterior right frontal, right basal ganglia and centrum semiovale's of suggestive of recent infarct.  The last MRI done at  9/29/2024 noted for the known right basal ganglia and corona radiata CVA, hemorrhage within the right temporal resection cavity and trace intraventricular hemorrhage.  These findings were discussed with neurosurgery at  as well as our neurology.  Neurosurgery felt the neurosurgical imaging had not worsened.  10/15 had another focal seizure episodes, seizure meds were adjusted and patient has been somnolent since then  Active Hospital Problems    Diagnosis Date Noted    Acute repetitive seizure (HCC) [G40.909] 10/15/2024    Subdural hematoma due to concussion [S06.5XAA] 10/13/2024    Localization-related focal epilepsy with complex partial seizures (HCC) [G40.209] 10/13/2024    Hemiparesis due to recent cerebrovascular accident (CVA) (HCC) [I69.359] 10/13/2024    AMS (altered mental status) [R41.82] 10/12/2024    Urinary retention [R33.9] 10/12/2024    Status post craniotomy [Z98.890] 10/12/2024    History of seizure [Z87.898] 10/12/2024    GBM (glioblastoma multiforme) (HCC) [C71.9] 10/12/2024    Acute cerebrovascular accident (CVA) due to ischemia (HCC) [I63.9] 10/02/2024         Principal Problem:  seizures disorder: Patient had altered mentation at rehab.  CT head  provider] aspirin  81 mg Oral Daily    atorvastatin  40 mg Oral Nightly    metoprolol succinate  50 mg Oral Nightly    sodium chloride flush  5-40 mL IntraVENous 2 times per day    enoxaparin  40 mg SubCUTAneous Daily     PRN Meds: LORazepam, labetalol, sodium chloride flush, sodium chloride, potassium chloride **OR** potassium alternative oral replacement **OR** potassium chloride, magnesium sulfate, ondansetron **OR** ondansetron, polyethylene glycol, acetaminophen **OR** acetaminophen      DVT Prophylaxis: SCD  Diet: ADULT DIET; Dysphagia - Minced and Moist  ADULT TUBE FEEDING; Nasogastric; Standard with Fiber; Continuous; 20; Yes; 10; Q 6 hours; 50; 30; Q 4 hours  Code Status: DNR-CCA    Dispo: Medically stable for discharge to rehab pending pre-CERT    ____________________________________________________________________________    Subjective:   Overnight Events:   Opens eyes and  can mumbles few words , says 'head hurts' when  head moved.  Sleepy again in few min    Physical Exam:  BP (!) 146/90   Pulse 96   Temp 98.6 °F (37 °C) (Temporal)   Resp 16   Ht 1.524 m (5')   Wt 52.9 kg (116 lb 11.2 oz)   SpO2 95%   BMI 22.79 kg/m²   General appearance: Somnolent   Neck: Supple, no thyromegally. No jugular venous distention.   Respiratory:  Normal respiratory effort. Clear to auscultation, no Rales/Wheezes/Rhonchi.  Cardiovascular: S1/S2 without murmurs, rubs or gallops. RRR  Abdomen: Soft, non-tender, non-distended, bowel sounds present.  Musculoskeletal: No clubbing, cyanosis or edema bilaterally.    Skin: Skin color, texture, turgor normal.  No rashes or lesions.  Neurologic:  Cranial nerves: II-XII intact, DENIA.  Left hemiplegia        Intake/Output Summary (Last 24 hours) at 10/20/2024 1245  Last data filed at 10/20/2024 1042  Gross per 24 hour   Intake 658 ml   Output 1450 ml   Net -792 ml       Labs:   Recent Labs     10/20/24  0455   WBC 5.0   HGB 12.0   HCT 34.8*           Recent Labs

## 2024-10-20 NOTE — PROGRESS NOTES
Neurology progress note    The patient was seen along with the hospitalist during the morning rounds today.      at bedside.     Remains very somnolent opens eyes when stimulated or had head movement complains of headache does not follow other commands consistently.      moving right hand and foot on command .   No involuntary movements or twitching or jerking seen.  Rash noted on the face and upper chest yesterday ? possible allergy to valproic acid  However it has been stable slightly better today she was given a dose of steroids    Had NG tube in place now and meds are given orally along with some feeding       General appearance: Lethargic  Mental Status:   AAO times one  Attention and concentration: poor, waxing and waning.  Open eyes to voice.  Can follow some directions, not consistently  Language: Cannot be assessed  Recent memory: Cannot be assessed  Remote memory: Cannot be assessed  Cannot assess fund of knowledge  Cranial Nerves:   II: Pupils: equal, round, reactive to light, bilaterally  III,IV,VI: No gaze preference. No nystagmus  V: Facial sensation: Left facial asymmetry  VII: Facial strength and movements: Cannot be assessed  XII: Tongue movements : Cannot be assessed  Musculoskeletal: Spontaneous movement of the right side extremities.  Strong right hand  and able to move right side toes to command.  Same left side weakness  Tone: Normal tone.  No rigidity.  Reflexes: symmetric 2+ in both arms and legs  Coordination: No abnormal movements  Sensation: No abnormal sensory deficit  Gait: Cannot be assessed due to patient condition     IMPRESSION:     Encephalopathy   Focal onset epilepsy with secondary generalization; on dual antiepileptic therapies now  Right temporal lobe glioblastoma multiforme status postsurgical resection x 2  Subacute ischemic stroke medial aspect old right temporal  Stable 5 mm hemorrhage right posterior parietooccipital region   Stable subdural hemorrhage medial

## 2024-10-21 PROBLEM — I10 HTN (HYPERTENSION), BENIGN: Status: ACTIVE | Noted: 2024-10-21

## 2024-10-21 PROBLEM — G93.40 ACUTE ENCEPHALOPATHY: Status: ACTIVE | Noted: 2024-10-21

## 2024-10-21 LAB
ALBUMIN SERPL-MCNC: 2.9 G/DL (ref 3.4–5)
ALP SERPL-CCNC: 56 U/L (ref 40–129)
ALT SERPL-CCNC: 23 U/L (ref 10–40)
ANION GAP SERPL CALCULATED.3IONS-SCNC: 11 MMOL/L (ref 3–16)
AST SERPL-CCNC: 29 U/L (ref 15–37)
BILIRUB DIRECT SERPL-MCNC: <0.1 MG/DL (ref 0–0.3)
BILIRUB INDIRECT SERPL-MCNC: 0.2 MG/DL (ref 0–1)
BILIRUB SERPL-MCNC: 0.3 MG/DL (ref 0–1)
BUN SERPL-MCNC: 19 MG/DL (ref 7–20)
CALCIUM SERPL-MCNC: 9.5 MG/DL (ref 8.3–10.6)
CHLORIDE SERPL-SCNC: 106 MMOL/L (ref 99–110)
CO2 SERPL-SCNC: 25 MMOL/L (ref 21–32)
CREAT SERPL-MCNC: 0.5 MG/DL (ref 0.6–1.2)
GFR SERPLBLD CREATININE-BSD FMLA CKD-EPI: >90 ML/MIN/{1.73_M2}
GLUCOSE SERPL-MCNC: 121 MG/DL (ref 70–99)
MAGNESIUM SERPL-MCNC: 2.08 MG/DL (ref 1.8–2.4)
PHOSPHATE SERPL-MCNC: 1.8 MG/DL (ref 2.5–4.9)
POTASSIUM SERPL-SCNC: 3.7 MMOL/L (ref 3.5–5.1)
PROT SERPL-MCNC: 6.2 G/DL (ref 6.4–8.2)
SODIUM SERPL-SCNC: 142 MMOL/L (ref 136–145)

## 2024-10-21 PROCEDURE — 84100 ASSAY OF PHOSPHORUS: CPT

## 2024-10-21 PROCEDURE — 6370000000 HC RX 637 (ALT 250 FOR IP): Performed by: NURSE PRACTITIONER

## 2024-10-21 PROCEDURE — 2580000003 HC RX 258: Performed by: INTERNAL MEDICINE

## 2024-10-21 PROCEDURE — APPNB30 APP NON BILLABLE TIME 0-30 MINS

## 2024-10-21 PROCEDURE — 80076 HEPATIC FUNCTION PANEL: CPT

## 2024-10-21 PROCEDURE — 2060000000 HC ICU INTERMEDIATE R&B

## 2024-10-21 PROCEDURE — 99233 SBSQ HOSP IP/OBS HIGH 50: CPT | Performed by: PSYCHIATRY & NEUROLOGY

## 2024-10-21 PROCEDURE — 6370000000 HC RX 637 (ALT 250 FOR IP): Performed by: STUDENT IN AN ORGANIZED HEALTH CARE EDUCATION/TRAINING PROGRAM

## 2024-10-21 PROCEDURE — 6360000002 HC RX W HCPCS: Performed by: STUDENT IN AN ORGANIZED HEALTH CARE EDUCATION/TRAINING PROGRAM

## 2024-10-21 PROCEDURE — C9254 INJECTION, LACOSAMIDE: HCPCS | Performed by: INTERNAL MEDICINE

## 2024-10-21 PROCEDURE — 83735 ASSAY OF MAGNESIUM: CPT

## 2024-10-21 PROCEDURE — 2580000003 HC RX 258: Performed by: STUDENT IN AN ORGANIZED HEALTH CARE EDUCATION/TRAINING PROGRAM

## 2024-10-21 PROCEDURE — 6370000000 HC RX 637 (ALT 250 FOR IP): Performed by: INTERNAL MEDICINE

## 2024-10-21 PROCEDURE — 6360000002 HC RX W HCPCS: Performed by: INTERNAL MEDICINE

## 2024-10-21 PROCEDURE — APPSS30 APP SPLIT SHARED TIME 16-30 MINUTES

## 2024-10-21 PROCEDURE — 36415 COLL VENOUS BLD VENIPUNCTURE: CPT

## 2024-10-21 PROCEDURE — 80048 BASIC METABOLIC PNL TOTAL CA: CPT

## 2024-10-21 RX ORDER — MORPHINE SULFATE 20 MG/ML
2.5 SOLUTION ORAL
Status: DISCONTINUED | OUTPATIENT
Start: 2024-10-21 | End: 2024-10-24 | Stop reason: SDUPTHER

## 2024-10-21 RX ADMIN — Medication 500 MG: at 10:01

## 2024-10-21 RX ADMIN — Medication 500 MG: at 12:30

## 2024-10-21 RX ADMIN — Medication 500 MG: at 23:44

## 2024-10-21 RX ADMIN — LACOSAMIDE 200 MG: 10 INJECTION INTRAVENOUS at 09:19

## 2024-10-21 RX ADMIN — METOPROLOL SUCCINATE 50 MG: 50 TABLET, EXTENDED RELEASE ORAL at 23:44

## 2024-10-21 RX ADMIN — Medication 2.5 MG: at 17:04

## 2024-10-21 RX ADMIN — VALPROIC ACID 250 MG: 250 SOLUTION ORAL at 05:32

## 2024-10-21 RX ADMIN — VALPROIC ACID 250 MG: 250 SOLUTION ORAL at 12:30

## 2024-10-21 RX ADMIN — WATER 40 MG: 1 INJECTION INTRAMUSCULAR; INTRAVENOUS; SUBCUTANEOUS at 09:15

## 2024-10-21 RX ADMIN — Medication 500 MG: at 17:07

## 2024-10-21 RX ADMIN — ATORVASTATIN CALCIUM 40 MG: 40 TABLET, FILM COATED ORAL at 23:44

## 2024-10-21 RX ADMIN — VALPROIC ACID 250 MG: 250 SOLUTION ORAL at 23:44

## 2024-10-21 RX ADMIN — Medication 10 ML: at 09:15

## 2024-10-21 ASSESSMENT — PAIN SCALES - GENERAL
PAINLEVEL_OUTOF10: 0
PAINLEVEL_OUTOF10: 0
PAINLEVEL_OUTOF10: 8
PAINLEVEL_OUTOF10: 0

## 2024-10-21 ASSESSMENT — PAIN SCALES - WONG BAKER
WONGBAKER_NUMERICALRESPONSE: HURTS WHOLE LOT
WONGBAKER_NUMERICALRESPONSE: NO HURT

## 2024-10-21 NOTE — PROGRESS NOTES
Palliative Care:    Day (9)    Follow up meeting with  at bedside. Patient showing more alertness this afternoon. Does give some eye contact and raised right hand slightly when  asked her to say \"hello\". Patient does not appear in distress. No seizure activity reported. Comfortably positioned on left side. All safety precautions in place.     Patient currently with NG placement as of 10/19/24. Tolerating well at this time. Per  after lengthy discussions he is hopeful she would be able to consume pleasure foods should she desire in near future.  Yet, he understands she could not discharge with NG placement nor would he pursue PEG placement for external feedings as patient did not want this. Speech therapy in daily to assess at this time.     [facility] following and will enroll when medically cleared for discharge to home. Potential next 48-72 hours to determine if patient more alert to complete swallow evaluation. Neurology following. Prognosis remains poor. Much emotional support provided to  during these discussions.     Palliative team will continue to follow as needed. Contact information provided on patient communication board. CM and Nurse updated of these discussions. Palliative update provided to [facility] ([facility] Phone: [phone],    Electronically signed by Ayla Allison RN, BSN, PN (Palliative Care) [phone]

## 2024-10-21 NOTE — PROGRESS NOTES
Rita Dixon  Neurology Follow-up  [facility] Neurology    Date of Service: 10/21/2024    Subjective:   CC: Follow up today regarding: Acute encephalopathy    Events noted. Chart and lab reviewed.  Patient seen this morning, family and nursing are at bedside.  She continues to wax and wane.  Family reports patient is more responsive this morning.  Had NG tube placed and tube feeds were started.  No seizures overnight.  Patient open eyes to voice but closes immediately.  She can follow some simple directions.  Other review of systems limited      ROS : A 10-12 system review obtained and updated today and is unremarkable except as mentioned  in my interval history.     Past medical history, social history, medication and family history reviewed.       Objective:  Exam:   Constitutional:   Vitals:    10/21/24 0000 10/21/24 0500 10/21/24 0717 10/21/24 0904   BP: (!) 160/74 (!) 144/77 125/74    Pulse: 88 76 79    Resp: 15  16    Temp: 97.9 °F (36.6 °C) 97.7 °F (36.5 °C) 98.1 °F (36.7 °C)    TempSrc: Temporal Temporal Temporal    SpO2: 97% 96% 94%    Weight:       Height:    1.524 m (5')     General appearance:  Normal development and appear in no acute distress.   Mental Status:   Oriented to person only  Memory: Cannot assess immediate and remote memory  Attention and concentration: Opens eyes to voice, can follow some directions but not consistently.  Waxing and waning  Cannot assess fund of knowledge  Cranial Nerves:   II:   Pupils: equal, round, reactive to light  III,IV,VI: Extra Ocular Movements are intact. No nystagmus  V: Facial sensation is intact  VII: Facial strength and movements: Left facial asymmetry  XII: Tongue movements are normal  Musculoskeletal: Able to squeeze right hand and able to wiggle right toes.  Same left-sided weakness  Tone: Normal tone.   Reflexes: Hyperreflexia left compared to right  Coordination: No abnormal movements, no fasciculations  Sensation: normal.  Gait/Posture: Cannot

## 2024-10-21 NOTE — PLAN OF CARE
Problem: Discharge Planning  Goal: Discharge to home or other facility with appropriate resources  10/21/2024 1307 by Haylie Copeland, RN  Outcome: Progressing  10/21/2024 0509 by Moriah Rivera RN  Outcome: Progressing  Flowsheets (Taken 10/20/2024 2045)  Discharge to home or other facility with appropriate resources:   Identify barriers to discharge with patient and caregiver   Arrange for needed discharge resources and transportation as appropriate   Identify discharge learning needs (meds, wound care, etc)   Refer to discharge planning if patient needs post-hospital services based on physician order or complex needs related to functional status, cognitive ability or social support system     Problem: Chronic Conditions and Co-morbidities  Goal: Patient's chronic conditions and co-morbidity symptoms are monitored and maintained or improved  10/21/2024 1307 by Haylie Copeland RN  Outcome: Progressing  10/21/2024 0509 by Moriah Rivera RN  Outcome: Progressing  Flowsheets (Taken 10/20/2024 2045)  Care Plan - Patient's Chronic Conditions and Co-Morbidity Symptoms are Monitored and Maintained or Improved:   Monitor and assess patient's chronic conditions and comorbid symptoms for stability, deterioration, or improvement   Collaborate with multidisciplinary team to address chronic and comorbid conditions and prevent exacerbation or deterioration   Update acute care plan with appropriate goals if chronic or comorbid symptoms are exacerbated and prevent overall improvement and discharge     Problem: Skin/Tissue Integrity  Goal: Absence of new skin breakdown  Description: 1.  Monitor for areas of redness and/or skin breakdown  2.  Assess vascular access sites hourly  3.  Every 4-6 hours minimum:  Change oxygen saturation probe site  4.  Every 4-6 hours:  If on nasal continuous positive airway pressure, respiratory therapy assess nares and determine need for appliance change or resting  period.  10/21/2024 1307 by Haylie Copeland RN  Outcome: Progressing  10/21/2024 0509 by Moriah Rivera RN  Outcome: Progressing     Problem: ABCDS Injury Assessment  Goal: Absence of physical injury  10/21/2024 1307 by Haylie Copeland RN  Outcome: Progressing  10/21/2024 0509 by Moriah Rivera RN  Outcome: Progressing     Problem: Safety - Adult  Goal: Free from fall injury  10/21/2024 1307 by Haylie Copeland RN  Outcome: Progressing  10/21/2024 0509 by Moriah Rivera RN  Outcome: Progressing     Problem: Pain  Goal: Verbalizes/displays adequate comfort level or baseline comfort level  10/21/2024 1307 by Haylie Copeland RN  Outcome: Progressing  10/21/2024 0509 by Moriah Rivrea RN  Outcome: Progressing  Flowsheets (Taken 10/20/2024 1930)  Verbalizes/displays adequate comfort level or baseline comfort level:   Encourage patient to monitor pain and request assistance   Assess pain using appropriate pain scale   Administer analgesics based on type and severity of pain and evaluate response   Implement non-pharmacological measures as appropriate and evaluate response   Consider cultural and social influences on pain and pain management   Notify Licensed Independent Practitioner if interventions unsuccessful or patient reports new pain     Problem: Decision Making  Goal: Pt/Family able to effectively weigh alternatives and participate in decision making related to treatment and care  Description: INTERVENTIONS:  1. Determine when there are differences between patient's view, family's view, and healthcare provider's view of condition  2. Facilitate patient and family articulation of goals for care  3. Help patient and family identify pros/cons of alternative solutions  4. Provide information as requested by patient/family  5. Respect patient/family right to receive or not to receive information  6. Serve as a liaison between patient and family and health care team  7. Initiate

## 2024-10-21 NOTE — PLAN OF CARE
Problem: Discharge Planning  Goal: Discharge to home or other facility with appropriate resources  10/21/2024 0509 by Moriah Rivera, RN  Outcome: Progressing  Flowsheets (Taken 10/20/2024 2045)  Discharge to home or other facility with appropriate resources:   Identify barriers to discharge with patient and caregiver   Arrange for needed discharge resources and transportation as appropriate   Identify discharge learning needs (meds, wound care, etc)   Refer to discharge planning if patient needs post-hospital services based on physician order or complex needs related to functional status, cognitive ability or social support system     Problem: Chronic Conditions and Co-morbidities  Goal: Patient's chronic conditions and co-morbidity symptoms are monitored and maintained or improved  10/21/2024 0509 by Moriah Rivera, RN  Outcome: Progressing  Flowsheets (Taken 10/20/2024 2045)  Care Plan - Patient's Chronic Conditions and Co-Morbidity Symptoms are Monitored and Maintained or Improved:   Monitor and assess patient's chronic conditions and comorbid symptoms for stability, deterioration, or improvement   Collaborate with multidisciplinary team to address chronic and comorbid conditions and prevent exacerbation or deterioration   Update acute care plan with appropriate goals if chronic or comorbid symptoms are exacerbated and prevent overall improvement and discharge     Problem: Skin/Tissue Integrity  Goal: Absence of new skin breakdown  Description: 1.  Monitor for areas of redness and/or skin breakdown  2.  Assess vascular access sites hourly  3.  Every 4-6 hours minimum:  Change oxygen saturation probe site  4.  Every 4-6 hours:  If on nasal continuous positive airway pressure, respiratory therapy assess nares and determine need for appliance change or resting period.  10/21/2024 0509 by Moriah Rivera, RN  Outcome: Progressing     Problem: ABCDS Injury Assessment  Goal: Absence of physical

## 2024-10-21 NOTE — CARE COORDINATION
Discharge Planning Note    spoke with Kirit at [facility] who states that they are following patient for Home hospice services.  Kirit states that she will need to order a bed and airmatress for patient when ready for discharge.  Kirit states if they receive notice in AM they can do same day delivery.  Kirit states they cannot provided services to patient with NG or IV medications.      Electronically signed by WESLEY Ramesh on 10/21/2024 at 2:27 PM  Phone [phone]

## 2024-10-21 NOTE — PROGRESS NOTES
Nutrition Note    RECOMMENDATIONS  PO Diet: Diet per SLP  Nutrition Support:   Decrease Jevity 1.5 (standard with fiber formula) to goal of 40 mL/hr.  Increase water flush to 140 mL q 4 hours.       ASSESSMENT   Pt triggered for consult to order/mgmt TF. Discharged from ARU 10/12 in which pt had been averaging >50% intake of meals. Had another focal seizure episode 10/15; seizure meds were adjusted and patient has been somnolent since then. On dysphagia minced and moist diet per SLP with recent documented meal intakes of 0%. NG tube placed 10/19 and TF initiated, Jevity 1.5 currently at 50 mL/hr per Dr. Roman's orders. Estimated energy need calculated, recommend decrease Jevity 1.5 to goal rate of 40 mL/hr to prevent overfeeding. RD will update TF orders in chart and continue to monitor. Appears plan to discharge with hospice services.     Malnutrition Status: At risk for malnutrition (Comment)  Acute Illness    NUTRITION DIAGNOSIS   Inadequate protein-energy intake related to cognitive or neurological impairment as evidenced by nutrition support - enteral nutrition    Goals: PO intake 50% or greater, Tolerate nutrition support at goal rate, by next RD assessment     NUTRITION RELATED FINDINGS  Objective: Phos 1.8. LBM 10/21. +1 pitting lle edema.  Wounds: Surgical Incision    CURRENT NUTRITION THERAPIES  ADULT DIET; Dysphagia - Minced and Moist  ADULT TUBE FEEDING; Nasogastric; Standard with Fiber; Continuous; 20; Yes; 10; Q 6 hours; 50; 30; Q 4 hours     PO Intake: 0%   PO Supplement Intake:None Ordered    Current Tube Feeding (TF) Orders:  Feeding Route: Nasogastric  Formula: Standard with Fiber  Schedule: Continuous  Additives/Modulars: None  Water Flushes: 140 mL H20 q 4 hours  Current TF & Flush Orders Provides: Jevity 1.5 at 50 mL/hr x 24 hrs provides 1200 mL total volume, 1800 calories, 912 mL free water, and 77 grams of protein. 30 mL H20 q 4 hr.  Goal TF & Flush Orders Provides: Jevity 1.5 at 40 mL/hr

## 2024-10-21 NOTE — PROGRESS NOTES
Speech Language Pathology  Attempt     Rita Destiny   1955     Attempted to see pt for follow up dysphagia therapy. Per chart review, pt has remained lethargic with limited responses. NG feeding tube was placed. Pt's spouse and son at bedside, reported pt has not had anything by mouth since last week. Pt with limited responses at bedside today, not stimulable for PO trials. Will modify diet order to reflect her NPO status with alterative nutrition. Will continue to follow to determine her ability to safely tolerate nutrition by mouth.     Signature  Aditi Barriga M.A. [facility] [id number]  Speech-Language Pathologist   10/21/2024 1:09 PM

## 2024-10-21 NOTE — DISCHARGE INSTR - COC
Continuity of Care Form    Patient Name: Rita Dixon   :  1955  MRN:  [id number]    Admit date:  10/12/2024  Discharge date:  10/24/24    Code Status Order: DNR-CCA  Advance Directives:   Advance Care Flowsheet Documentation             Admitting Physician:  Jony Escoto MD  PCP: Unknown, Provider    Discharging Nurse: Tessa Bueno RN BSN  Discharging Hospital Unit/Room#: 3TN-3376/3376-01  Discharging Unit Phone Number: [phone]    Emergency Contact:   Extended Emergency Contact Information  Primary Emergency Contact: Francis Dixon  Address: [address] [place] [address] [place], [place] [address] [place] [address] of [place]  Home Phone: [phone]  Mobile Phone: [phone]  Relation: Spouse  Preferred language: English    Past Surgical History:  Past Surgical History:   Procedure Laterality Date    BRAIN SURGERY Right 2023    CRANIOTOMY Right 2024    EYE SURGERY Left        Immunization History:   Immunization History   Administered Date(s) Administered    COVID-19, PFIZER PURPLE top, DILUTE for use, (age 12 y+), 30mcg/0.3mL 2021, 2021, 2021       Active Problems:  Patient Active Problem List   Diagnosis Code    Generalized tonic-clonic seizure (HCC) G40.409    Abnormal CT of the head R93.0    Acute cerebrovascular accident (CVA) due to ischemia (HCC) I63.9    AMS (altered mental status) R41.82    History of seizure Z87.898    GBM (glioblastoma multiforme) (HCC) C71.9    Status post craniotomy Z98.890    Urinary retention R33.9    Subdural hematoma due to concussion S06.5XAA    Localization-related focal epilepsy with complex partial seizures (HCC) G40.209    Hemiparesis due to recent cerebrovascular accident (CVA) (HCC) I69.359    Acute repetitive seizure (HCC) G40.909       Isolation/Infection:   Isolation            No Isolation          Patient Infection Status       None to display            Nurse Assessment:  Last Vital Signs: BP (!) 147/45   Pulse 87   Temp 98.2 °F  (36.8 °C) (Axillary)   Resp 16   Ht 1.524 m (5')   Wt 52.9 kg (116 lb 11.2 oz)   SpO2 96%   BMI 22.79 kg/m²     Last documented pain score (0-10 scale): Pain Level: 0  Last Weight:   Wt Readings from Last 1 Encounters:   10/19/24 52.9 kg (116 lb 11.2 oz)     Mental Status:  disoriented, oriented, and alert    IV Access:  - None    Nursing Mobility/ADLs:  Walking   Dependent  Transfer  Dependent  Bathing  Dependent  Dressing  Dependent  Toileting  Dependent  Feeding  Dependent  Med Admin  Dependent  Med Delivery       Wound Care Documentation and Therapy:  Incision 10/02/24 Head Right;Upper (Active)   Dressing Status Other (Comment) 10/20/24 1932   Incision Cleansed Not Cleansed 10/21/24 0840   Dressing/Treatment Open to air 10/21/24 0840   Closure Open to air 10/21/24 0840   Margins Approximated 10/21/24 0840   Incision Assessment Dry;Eschar 10/21/24 0840   Drainage Amount None (dry) 10/21/24 0840   Odor None 10/21/24 0840   Maryam-incision Assessment Intact;Dry/flaky 10/21/24 0840   Number of days: 18        Elimination:  Continence:   Bowel: No  Bladder: No  Urinary Catheter: Insertion Date: 10/12/24    Colostomy/Ileostomy/Ileal Conduit: No       Date of Last BM: 10/23/24    Intake/Output Summary (Last 24 hours) at 10/21/2024 1432  Last data filed at 10/21/2024 0548  Gross per 24 hour   Intake 1368 ml   Output 775 ml   Net 593 ml     I/O last 3 completed shifts:  In: 2026 [NG/GT:2026]  Out: 2225 [Urine:2225]    Safety Concerns:     At Risk for Falls, History of Seizures, and Aspiration Risk    Impairments/Disabilities:      Speech, Vision, Hearing, and previous stroke deficits, flaccid left side body     Nutrition Therapy:  Current Nutrition Therapy:   - \"Pleasure feeds as tolerated, Meds are oral solutions\"--per Dr. Danisha Zavala on 10/24/24 @11:16AM    Routes of Feeding: None--pleasure feeds as tolerated per Dr. Zavala   Liquids: No Liquids  Daily Fluid Restriction: {CHP DME Yes amt example:[id number]}  Last

## 2024-10-21 NOTE — PROGRESS NOTES
.          Hospitalist Progress Note      PCP: Unknown, Provider    Date of Admission: 10/12/2024    LOS: 9    Chief Complaint: No chief complaint on file.      Case Summary:    68 y.o. female with history of hypertension, seizure disorder, recent craniotomy for glioblastoma, recent right basal ganglia and coronary radiata CVA, seizure disorder who was rehabilitating in ARU , admitted for seizure episode, was recovering and then had another episode of focal seizure .  Initial and repeat imaging show stable post surgical change and intraventricular hemorrhage.  10/15 had another focal seizure episodes, seizure meds were adjusted and patient has been somnolent since then.  Very slowly improving.  Family agreeable to hospice but currenlty waiting to see if she improves little more enough for some feeding and taking meds  Active Hospital Problems    Diagnosis Date Noted    Acute repetitive seizure (HCC) [G40.909] 10/15/2024    Subdural hematoma due to concussion [S06.5XAA] 10/13/2024    Localization-related focal epilepsy with complex partial seizures (HCC) [G40.209] 10/13/2024    Hemiparesis due to recent cerebrovascular accident (CVA) (HCC) [I69.359] 10/13/2024    AMS (altered mental status) [R41.82] 10/12/2024    Urinary retention [R33.9] 10/12/2024    Status post craniotomy [Z98.890] 10/12/2024    History of seizure [Z87.898] 10/12/2024    GBM (glioblastoma multiforme) (HCC) [C71.9] 10/12/2024    Acute cerebrovascular accident (CVA) due to ischemia (HCC) [I63.9] 10/02/2024         Principal Problem:  seizures disorder: Patient had altered mentation at rehab.  CT head showed stable subdural hematoma.  Repeat CT 10/16 showed stable hemorrhage  10/18 continue Lamictal and valproic acid, discontinue Keppra due to excessive sleepiness.   D/w dr Fishman from neuro   EEG done's 10/17 negative for seizure spikes    Facial rash  Could be valproate effect  . Given 1 dose steroid  yesterday . Cont to monitor if doesn resolve or  NG/OG/NE TUBE PLACEMENT   Final Result   OG tip in body of stomach and side-port in gastric fundus         CT HEAD WO CONTRAST   Final Result   Stable intraventricular hemorrhage.  The previously noted area of hemorrhage   in the right parietooccipital region appears to be in the atrium of the right   lateral ventricle.      Stable postoperative change involving the right temporal pole.      Stable hemorrhage in the medial aspect of the right middle cranial fossa.      Interval development of a pseudomeningocele peripheral to the craniotomy site.         CT HEAD WO CONTRAST   Final Result   Stable 5 mm hemorrhage right posterior parietooccipital region      Stable hemorrhage medial aspect right temporal lobe                 Juan C Roman MD      Please excuse brevity and/or typos. This report was transcribed using voice recognition software. Every effort was made to ensure accuracy, however, inadvertent computerized transcription errors may be present.

## 2024-10-22 LAB
ANION GAP SERPL CALCULATED.3IONS-SCNC: 9 MMOL/L (ref 3–16)
BUN SERPL-MCNC: 20 MG/DL (ref 7–20)
CALCIUM SERPL-MCNC: 9.1 MG/DL (ref 8.3–10.6)
CHLORIDE SERPL-SCNC: 104 MMOL/L (ref 99–110)
CO2 SERPL-SCNC: 26 MMOL/L (ref 21–32)
CREAT SERPL-MCNC: <0.5 MG/DL (ref 0.6–1.2)
GFR SERPLBLD CREATININE-BSD FMLA CKD-EPI: >90 ML/MIN/{1.73_M2}
GLUCOSE BLD-MCNC: 121 MG/DL (ref 70–99)
GLUCOSE SERPL-MCNC: 116 MG/DL (ref 70–99)
MAGNESIUM SERPL-MCNC: 2.03 MG/DL (ref 1.8–2.4)
PERFORMED ON: ABNORMAL
PHOSPHATE SERPL-MCNC: 2.7 MG/DL (ref 2.5–4.9)
POTASSIUM SERPL-SCNC: 3.8 MMOL/L (ref 3.5–5.1)
SODIUM SERPL-SCNC: 139 MMOL/L (ref 136–145)

## 2024-10-22 PROCEDURE — 6370000000 HC RX 637 (ALT 250 FOR IP): Performed by: NURSE PRACTITIONER

## 2024-10-22 PROCEDURE — 36415 COLL VENOUS BLD VENIPUNCTURE: CPT

## 2024-10-22 PROCEDURE — 2580000003 HC RX 258: Performed by: INTERNAL MEDICINE

## 2024-10-22 PROCEDURE — C9254 INJECTION, LACOSAMIDE: HCPCS | Performed by: INTERNAL MEDICINE

## 2024-10-22 PROCEDURE — 6360000002 HC RX W HCPCS: Performed by: INTERNAL MEDICINE

## 2024-10-22 PROCEDURE — 6370000000 HC RX 637 (ALT 250 FOR IP): Performed by: INTERNAL MEDICINE

## 2024-10-22 PROCEDURE — 6370000000 HC RX 637 (ALT 250 FOR IP): Performed by: STUDENT IN AN ORGANIZED HEALTH CARE EDUCATION/TRAINING PROGRAM

## 2024-10-22 PROCEDURE — 80048 BASIC METABOLIC PNL TOTAL CA: CPT

## 2024-10-22 PROCEDURE — 2060000000 HC ICU INTERMEDIATE R&B

## 2024-10-22 PROCEDURE — 83735 ASSAY OF MAGNESIUM: CPT

## 2024-10-22 PROCEDURE — APPNB30 APP NON BILLABLE TIME 0-30 MINS

## 2024-10-22 PROCEDURE — 95819 EEG AWAKE AND ASLEEP: CPT

## 2024-10-22 PROCEDURE — 6360000002 HC RX W HCPCS: Performed by: NEUROMUSCULOSKELETAL MEDICINE & OMM

## 2024-10-22 PROCEDURE — APPSS30 APP SPLIT SHARED TIME 16-30 MINUTES

## 2024-10-22 PROCEDURE — 84100 ASSAY OF PHOSPHORUS: CPT

## 2024-10-22 PROCEDURE — 95816 EEG AWAKE AND DROWSY: CPT | Performed by: PSYCHIATRY & NEUROLOGY

## 2024-10-22 PROCEDURE — 99233 SBSQ HOSP IP/OBS HIGH 50: CPT | Performed by: PSYCHIATRY & NEUROLOGY

## 2024-10-22 RX ADMIN — LACOSAMIDE 200 MG: 10 INJECTION INTRAVENOUS at 10:41

## 2024-10-22 RX ADMIN — METOPROLOL SUCCINATE 50 MG: 50 TABLET, EXTENDED RELEASE ORAL at 20:48

## 2024-10-22 RX ADMIN — Medication 10 ML: at 00:05

## 2024-10-22 RX ADMIN — LACOSAMIDE 200 MG: 10 INJECTION INTRAVENOUS at 22:41

## 2024-10-22 RX ADMIN — VALPROIC ACID 250 MG: 250 SOLUTION ORAL at 16:34

## 2024-10-22 RX ADMIN — VALPROIC ACID 250 MG: 250 SOLUTION ORAL at 23:22

## 2024-10-22 RX ADMIN — ASPIRIN 81 MG: 81 TABLET, COATED ORAL at 09:14

## 2024-10-22 RX ADMIN — ATORVASTATIN CALCIUM 40 MG: 40 TABLET, FILM COATED ORAL at 20:48

## 2024-10-22 RX ADMIN — Medication 500 MG: at 16:34

## 2024-10-22 RX ADMIN — ENOXAPARIN SODIUM 40 MG: 100 INJECTION SUBCUTANEOUS at 09:14

## 2024-10-22 RX ADMIN — Medication 10 ML: at 13:42

## 2024-10-22 RX ADMIN — Medication 500 MG: at 09:14

## 2024-10-22 RX ADMIN — VALPROIC ACID 250 MG: 250 SOLUTION ORAL at 06:06

## 2024-10-22 RX ADMIN — LACOSAMIDE 200 MG: 10 INJECTION INTRAVENOUS at 00:00

## 2024-10-22 RX ADMIN — Medication 10 ML: at 20:48

## 2024-10-22 ASSESSMENT — PAIN SCALES - GENERAL: PAINLEVEL_OUTOF10: 0

## 2024-10-22 NOTE — PLAN OF CARE
Problem: Discharge Planning  Goal: Discharge to home or other facility with appropriate resources  10/22/2024 1117 by Chiara Lentz RN  Outcome: Progressing  10/22/2024 1117 by Chiara Lentz RN  Outcome: Progressing     Problem: Chronic Conditions and Co-morbidities  Goal: Patient's chronic conditions and co-morbidity symptoms are monitored and maintained or improved  10/22/2024 1117 by Chiara Lentz, RN  Outcome: Progressing  10/22/2024 1117 by Chiara Lentz RN  Outcome: Progressing     Problem: Skin/Tissue Integrity  Goal: Absence of new skin breakdown  Description: 1.  Monitor for areas of redness and/or skin breakdown  2.  Assess vascular access sites hourly  3.  Every 4-6 hours minimum:  Change oxygen saturation probe site  4.  Every 4-6 hours:  If on nasal continuous positive airway pressure, respiratory therapy assess nares and determine need for appliance change or resting period.  10/22/2024 1117 by Chiara Lentz RN  Outcome: Progressing  10/22/2024 1117 by Chiara Lentz RN  Outcome: Progressing     Problem: ABCDS Injury Assessment  Goal: Absence of physical injury  10/22/2024 1117 by Chiara Lentz RN  Outcome: Progressing  10/22/2024 1117 by Chiara Lentz RN  Outcome: Progressing     Problem: Safety - Adult  Goal: Free from fall injury  10/22/2024 1117 by Chiara Lentz RN  Outcome: Progressing  10/22/2024 1117 by Chiara Lentz RN  Outcome: Progressing     Problem: Pain  Goal: Verbalizes/displays adequate comfort level or baseline comfort level  10/22/2024 1117 by Chiara Lentz RN  Outcome: Progressing  Flowsheets (Taken 10/21/2024 2335 by Jonathan Mathew)  Verbalizes/displays adequate comfort level or baseline comfort level:   Assess pain using appropriate pain scale   Administer analgesics based on type and severity of pain and evaluate response   Implement non-pharmacological measures as appropriate and evaluate response   Consider cultural and social influences on pain and pain management

## 2024-10-22 NOTE — PROGRESS NOTES
be assessed    MDM:      A. Problems (any 1)    High:    [x] Acute/Chronic Illness/injury posing threat to life or bodily function:    [] Severe exacerbation of chronic illness:      Moderate:    []     1 or more chronic illness with exacerbation, progression or side effect of treatment or  []     2 or more stable chronic illnesses or  []     1 acute illness with systemic symptoms     ---------------------------------------------------------------------  B. Risk of Treatment (any 1)   [x] Drugs/treatments that require intensive monitoring for toxicity include:    [] Change in code status:    [] Decision to escalate care:    [] Major surgery/procedure with associated risk factors:    [x] Prescription drug management  ----------------------------------------------------------------------  [x] High (any 2)  [] Moderate (any 1)    C. Data (any 2 for high and any 1 for moderate)  [x] Discussed management of the case with: Family, nurse  [x] Imaging personally reviewed and interpreted, includes:    [x] Data Review (any 3)  [x] Collateral history obtained from: Family, nurse  [x] All available Consultant notes from yesterday/today were reviewed  [x] All current labs were reviewed and interpreted for clinical significance   [x] Appropriate follow-up labs were ordered      Data  LABS:   Lab Results   Component Value Date/Time     10/22/2024 05:19 AM    K 3.8 10/22/2024 05:19 AM    K 3.8 10/15/2024 10:24 AM     10/22/2024 05:19 AM    CO2 26 10/22/2024 05:19 AM    BUN 20 10/22/2024 05:19 AM    CREATININE <0.5 10/22/2024 05:19 AM    LABGLOM >90 10/22/2024 05:19 AM    LABGLOM >60 09/26/2023 11:13 AM    GLUCOSE 116 10/22/2024 05:19 AM    PHOS 2.7 10/22/2024 05:19 AM    MG 2.03 10/22/2024 05:19 AM    CALCIUM 9.1 10/22/2024 05:19 AM     Lab Results   Component Value Date/Time    WBC 5.0 10/20/2024 04:55 AM    RBC 3.47 10/20/2024 04:55 AM    HGB 12.0 10/20/2024 04:55 AM    HCT 34.8 10/20/2024 04:55 AM    .5  10/22/24 at 12:49 PM EDT       This dictation was generated by voice recognition computer software. Although all attempts are made to edit the dictation for accuracy, there may be errors in the transcription that are not intended.

## 2024-10-22 NOTE — PROGRESS NOTES
Palliative Care:    Day (10)    Follow up meeting this AM with  during his consultation with Neurology.  expressing grief of his own desire in wanting his wife to awaken and be able to consume fluids/foods vs. him having to make the overall decision on removal of NG tube and initiate comfort care measures. Palliative continues with education on comfort care support in home and potential outcomes should patient have PEG placement and need for SNF to manage.  does state patient had voiced no PEG in past conversations with him prior to this recent event.  after discussions with Neurology is awaiting for positive reactions from patient. Goal is in next 48 hours if no improvement to remove NG and discharge to home setting with hospice. [facility] is following. Intake liaison Hermila. Phone: Phone: [phone].     Dr. Zavala updated. Palliative team will continue to follow as needed. Contact information provided on patient communication board. CM and Nurse updated of these discussions.     Electronically signed by Ayla Allison RN, BSN, CHPN (Palliative Care) [phone]

## 2024-10-22 NOTE — PROCEDURES
Patient: Rita Dixon    MR Number: [id number]  YOB: 1955  Date of Visit: 10/22/2024    Clinical History:  The patient is a 68 y.o. years old female with history of GBM resection and focal epilepsy.  Medications reviewed.      Method:  The EEG was performed utilizing the international 10/20 of electrode placements of both referential and bipolar montages. The patient was  drowsy through out the recording.  Photic stimulation was performed.    Findings:  The background of the EEG showed  generalized diffuse slowing throughout the recording in the mixture of delta and theta with average amplitude.   This generalized slowing was symmetric, non rhythmical, and continuous.  Intermittent right hemispheric delta slowing was seen few times throughout the recording lasting for few minutes but no EGM buildup.  No spike or sharp waves were seen.  Photic stimulation did not activate EEG.     Impression:  This EEG is abnormal.  The generalized diffuse slowing is suggestive of moderate diffuse encephalopathy.  There is also evidence of structural dysfunction of the right hemisphere which is consistent with patient's prior history of CVA.  No epileptiform discharge.     Leandra Baker MD      Board certified in clinical neurophysiology

## 2024-10-22 NOTE — PROGRESS NOTES
Vitals:    10/22/24 0815   BP: (!) 150/75   Pulse: 79   Resp: 16   Temp: 97.8 °F (36.6 °C)   SpO2: 97%     .6lb although  does not trust this weight. Bed scale used with three blankets, four pillows. Patient more responsive today, answering questions, Alert and oriented to person and place. Patient had two bms today. Tube feed infusing. Redness to buttocks, triad cream applied. Pills crushed in NG tube, flushing well and at 58cm. Bathed, mouth care completed today. Latham care completed with soap and water. Will monitor.

## 2024-10-22 NOTE — PROGRESS NOTES
V2.0    [facility]      Name:  Rtia Dixon /Age/Sex: 1955  (68 y.o. female)   MRN & CSN:  [id number] & [id number] Encounter Date/Time: 10/22/2024 12:48 PM EDT   Location:  [facility]3376/3376-01 PCP: Unknown, Provider     Attending:Danisha Zavala MD       Hospital Day: 11    Assessment and Recommendations   Rita Dixon is a 68 y.o. female with pmh of history of HTN, seizure disorder, recent craniotomy for glioblastoma, recent right basal ganglia and coronary radiata CVA with left-sided weakness, who was rehabilitating in ARU, admitted for further management of seizures and AMS.     Plan:     Epilepsy with breakthrough seizures:   Initial CT head showed stable subdural hematoma postsurgical changes.    Repeat CT 10/16 showed stable hemorrhage.  Neurology following: continue Lamictal and valproic acid, discontinued Keppra due to excessive sleepiness.   EEG done on 10/17 negative for epileptiform spikes.  Repeat EEG today.     Right temporal lobe SDH: Stable CT head noted for subdural hematoma.       AMS: Multifactorial in the setting of seizures, GBM, CVA, SDH.  Patient still waxing and waning.      Dysphagia: Continue enteral feeding.   Spouse declining PEG tube.  Speech following.    Hypophosphatemia: Replaced.  Monitor electrolytes and replace as needed.     Hypertension: Monitor BP on metoprolol.      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      Right temporal GBM s/p craniotomy: Stable.    Urinary retention: Latham catheter placed.       Goals of care: DNR CCA.  Palliative care/hospice on board.  Patient will discharge with hospice once family agrees.  Currently waiting to see if she improves little more and tolerate some oral feeding.    Diet ADULT TUBE FEEDING; Nasogastric; Standard with Fiber; Continuous; 40; No; 140; Q 4 hours   DVT Prophylaxis [x] Lovenox, []  Heparin, [] SCDs, [] Ambulation,  [] Eliquis, [] Xarelto  [] Coumadin   Code Status

## 2024-10-23 LAB
ALBUMIN SERPL-MCNC: 3 G/DL (ref 3.4–5)
ALP SERPL-CCNC: 68 U/L (ref 40–129)
ALT SERPL-CCNC: 67 U/L (ref 10–40)
ANION GAP SERPL CALCULATED.3IONS-SCNC: 12 MMOL/L (ref 3–16)
AST SERPL-CCNC: 59 U/L (ref 15–37)
BILIRUB DIRECT SERPL-MCNC: <0.1 MG/DL (ref 0–0.3)
BILIRUB INDIRECT SERPL-MCNC: 0.2 MG/DL (ref 0–1)
BILIRUB SERPL-MCNC: 0.3 MG/DL (ref 0–1)
BUN SERPL-MCNC: 18 MG/DL (ref 7–20)
CALCIUM SERPL-MCNC: 9.4 MG/DL (ref 8.3–10.6)
CHLORIDE SERPL-SCNC: 101 MMOL/L (ref 99–110)
CO2 SERPL-SCNC: 25 MMOL/L (ref 21–32)
CREAT SERPL-MCNC: 0.5 MG/DL (ref 0.6–1.2)
GFR SERPLBLD CREATININE-BSD FMLA CKD-EPI: >90 ML/MIN/{1.73_M2}
GLUCOSE SERPL-MCNC: 115 MG/DL (ref 70–99)
POTASSIUM SERPL-SCNC: 3.9 MMOL/L (ref 3.5–5.1)
PROT SERPL-MCNC: 5.9 G/DL (ref 6.4–8.2)
SODIUM SERPL-SCNC: 138 MMOL/L (ref 136–145)

## 2024-10-23 PROCEDURE — 2580000003 HC RX 258: Performed by: INTERNAL MEDICINE

## 2024-10-23 PROCEDURE — 2060000000 HC ICU INTERMEDIATE R&B

## 2024-10-23 PROCEDURE — 51702 INSERT TEMP BLADDER CATH: CPT

## 2024-10-23 PROCEDURE — APPSS30 APP SPLIT SHARED TIME 16-30 MINUTES

## 2024-10-23 PROCEDURE — 6360000002 HC RX W HCPCS: Performed by: NEUROMUSCULOSKELETAL MEDICINE & OMM

## 2024-10-23 PROCEDURE — 80076 HEPATIC FUNCTION PANEL: CPT

## 2024-10-23 PROCEDURE — 6360000002 HC RX W HCPCS: Performed by: INTERNAL MEDICINE

## 2024-10-23 PROCEDURE — 6370000000 HC RX 637 (ALT 250 FOR IP): Performed by: NURSE PRACTITIONER

## 2024-10-23 PROCEDURE — 80048 BASIC METABOLIC PNL TOTAL CA: CPT

## 2024-10-23 PROCEDURE — 36415 COLL VENOUS BLD VENIPUNCTURE: CPT

## 2024-10-23 PROCEDURE — 92526 ORAL FUNCTION THERAPY: CPT

## 2024-10-23 PROCEDURE — 6370000000 HC RX 637 (ALT 250 FOR IP): Performed by: INTERNAL MEDICINE

## 2024-10-23 PROCEDURE — C9254 INJECTION, LACOSAMIDE: HCPCS | Performed by: INTERNAL MEDICINE

## 2024-10-23 PROCEDURE — 92507 TX SP LANG VOICE COMM INDIV: CPT

## 2024-10-23 PROCEDURE — 99233 SBSQ HOSP IP/OBS HIGH 50: CPT | Performed by: PSYCHIATRY & NEUROLOGY

## 2024-10-23 RX ORDER — DIAZEPAM 10 MG/2G
10 GEL RECTAL 2 TIMES DAILY PRN
Status: DISCONTINUED | OUTPATIENT
Start: 2024-10-23 | End: 2024-10-24 | Stop reason: HOSPADM

## 2024-10-23 RX ADMIN — ACETAMINOPHEN 650 MG: 650 SUPPOSITORY RECTAL at 13:10

## 2024-10-23 RX ADMIN — VALPROIC ACID 250 MG: 250 SOLUTION ORAL at 06:07

## 2024-10-23 RX ADMIN — LACOSAMIDE 200 MG: 10 INJECTION INTRAVENOUS at 13:17

## 2024-10-23 RX ADMIN — ENOXAPARIN SODIUM 40 MG: 100 INJECTION SUBCUTANEOUS at 11:46

## 2024-10-23 RX ADMIN — VALPROIC ACID 250 MG: 250 SOLUTION ORAL at 11:59

## 2024-10-23 RX ADMIN — LACOSAMIDE 200 MG: 10 INJECTION INTRAVENOUS at 23:34

## 2024-10-23 RX ADMIN — Medication 500 MG: at 11:49

## 2024-10-23 ASSESSMENT — PAIN SCALES - WONG BAKER
WONGBAKER_NUMERICALRESPONSE: NO HURT

## 2024-10-23 ASSESSMENT — PAIN DESCRIPTION - LOCATION: LOCATION: NECK

## 2024-10-23 ASSESSMENT — PAIN DESCRIPTION - ORIENTATION: ORIENTATION: RIGHT

## 2024-10-23 NOTE — PROGRESS NOTES
Rita Destiny  Neurology Follow-up  [facility]    Date of Service: 10/23/2024    Subjective:   CC: Follow up today regarding: Acute encephalopathy    Events noted. Chart and lab reviewed.  Patient seen this morning, daughter and son are at bedside.  She is more or less the same as yesterday.  We discussed EEG yesterday showed no epileptiform discharges.  Nursing notes reviewed, patient was mostly denied and fell asleep early this morning.  Patient able to work with speech therapy, but was limited by fatigue.  She denies any headaches, vision changes, neck or chest pain.  She is able to move her right arm and leg, and same left arm and leg weakness.      ROS : A 10-12 system review obtained and updated today and is unremarkable except as mentioned  in my interval history.     Past medical history, social history, medication and family history reviewed.       Objective:  Exam:   Constitutional:   Vitals:    10/22/24 2322 10/23/24 0301 10/23/24 0718 10/23/24 0815   BP: (!) 162/75 (!) 157/75 (!) 152/77    Pulse: 84 82 86    Resp: 14 16 15    Temp: 98.2 °F (36.8 °C) 98.3 °F (36.8 °C) 97.9 °F (36.6 °C)    TempSrc: Oral Oral Oral    SpO2: 96% 95% 96%    Weight:    56 kg (123 lb 6.4 oz)   Height:           General appearance:  Normal development and appear in no acute distress.   Mental Status:   Oriented to person and place  Memory: Poor immediate and remote memory  Attention and concentration: Poor attention and concentration, waxing and waning.  Able to follow directions.  Poor fund of knowledge  Cranial Nerves:   II:   Pupils: equal, round, reactive to light  III,IV,VI: Extra Ocular Movements are intact. No nystagmus  V: Facial sensation is intact  VII: Facial strength and movements: Same left facial asymmetry  XII: Tongue movements are normal  Musculoskeletal: Able to move right arm and legs to voice, same left-sided weakness  Tone: Normal tone.   Reflexes: Same hyperreflexia left compared to

## 2024-10-23 NOTE — CARE COORDINATION
Spouse has decided to take the patient home with [facility] without an NG or peg tube.     Spoke with [facility]/hospice to update. Kirit asking for a med list to be faxed to [phone].    Spoke with [facility] who advised the patient will have seizure medication only. This will be given rectally. The patient will have comfort meds that will be ordered by hospice.     Spoke with Kirit to provide update. Kirit is okay with medication plan. CM to set up transport for tomorrow between 12:00 and 1:00. Call Kirit back with a time.     Spoke with Francis/spouse to verify address for transportation. The spouse works in [place], [place] and through his employment the squad in [place] will pick the patient up from the hospital and transport her home.  This is a benefit to the Francis through his employment. CM provided range of 12:00 - 2:00. Spouse will call the [place] [facility] and will call CM back.      [place] [facility] to pick the patient up at 1:00.

## 2024-10-23 NOTE — PLAN OF CARE
Problem: Discharge Planning  Goal: Discharge to home or other facility with appropriate resources  10/23/2024 0735 by Tessa Bueno RN  Outcome: Progressing     Problem: Chronic Conditions and Co-morbidities  Goal: Patient's chronic conditions and co-morbidity symptoms are monitored and maintained or improved  10/23/2024 0735 by Tessa Bueno RN  Outcome: Progressing     Problem: Skin/Tissue Integrity  Goal: Absence of new skin breakdown  Description: 1.  Monitor for areas of redness and/or skin breakdown  2.  Assess vascular access sites hourly  3.  Every 4-6 hours minimum:  Change oxygen saturation probe site  4.  Every 4-6 hours:  If on nasal continuous positive airway pressure, respiratory therapy assess nares and determine need for appliance change or resting period.  10/23/2024 0735 by Tessa Bueno RN  Outcome: Progressing     Problem: ABCDS Injury Assessment  Goal: Absence of physical injury  Outcome: Progressing     Problem: Safety - Adult  Goal: Free from fall injury  Outcome: Progressing     Problem: Pain  Goal: Verbalizes/displays adequate comfort level or baseline comfort level  Outcome: Progressing     Problem: Decision Making  Goal: Pt/Family able to effectively weigh alternatives and participate in decision making related to treatment and care  Description: INTERVENTIONS:  1. Determine when there are differences between patient's view, family's view, and healthcare provider's view of condition  2. Facilitate patient and family articulation of goals for care  3. Help patient and family identify pros/cons of alternative solutions  4. Provide information as requested by patient/family  5. Respect patient/family right to receive or not to receive information  6. Serve as a liaison between patient and family and health care team  7. Initiate Consults from Ethics, Palliative Care or initiate Family Care Conference as is appropriate  Outcome: Progressing     Problem: Nutrition Deficit:  Goal:

## 2024-10-23 NOTE — PROGRESS NOTES
V2.0    [facility] Progress Note      Name:  Rita Dixon /Age/Sex: 1955  (68 y.o. female)   MRN & CSN:  [id number] & [id number] Encounter Date/Time: 10/23/2024 12:48 PM EDT   Location:  [facility]3376/3376-01 PCP: Unknown, Provider     Attending:Danisha Zavala MD       Hospital Day: 12    Assessment and Recommendations   Rita Dixon is a 68 y.o. female with pmh of history of HTN, seizure disorder, recent craniotomy for glioblastoma, recent right basal ganglia and coronary radiata CVA with left-sided weakness, who was rehabilitating in ARU, admitted for further management of seizures and AMS.     Plan:     Epilepsy with breakthrough seizures:   Initial CT head showed stable subdural hematoma postsurgical changes.    Repeat CT 10/16 showed stable hemorrhage.  Neurology following: continue Lamictal and valproic acid, discontinued Keppra due to excessive sleepiness.   EEG done on 10/17 and 10/22 negative for epileptiform discharges.  Poor prognosis with no further recommendations per neurology.    Right temporal lobe SDH: Stable CT head noted for subdural hematoma.       AMS: Multifactorial in the setting of seizures, GBM, CVA, SDH.  Patient still waxing and waning with no significant improvement in mentation..      Dysphagia: Continue enteral feeding.   Spouse declining PEG tube.  Speech following; remain strictly n.p.o.  Failed reevaluation today.    Hypophosphatemia: Replaced.  Monitor electrolytes and replace as needed.     Hypertension: Monitor BP on metoprolol.      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      Right temporal GBM s/p craniotomy: Stable.    Urinary retention: Latham catheter placed.       Goals of care: DNR CCA.  Palliative care/hospice on board.  Plan is to discontinue NGT feeding and discharged with hospice tomorrow.    Diet ADULT TUBE FEEDING; Nasogastric; Standard with Fiber; Continuous; 40; No; 140; Q 4 hours   DVT Prophylaxis [x] Lovenox, []

## 2024-10-23 NOTE — PROGRESS NOTES
No acute events over night. VSS, NSR on tele. Pt had two medium sized bowel movements over night. Pt following commands that she is physically able to follow. Delayed responses/ no responses intermittently. Pt appeared to be up most the night- she fell asleep around 0400 this morning. Q2 turns, Q2 incontinence check, fall precautions in place, seizure precautions in place.     Evangelist Saenz RN

## 2024-10-23 NOTE — PROGRESS NOTES
Speech Language Pathology  [facility]  [phone]  SLP Dysphagia and Speech Language Cognitive Treatment       Patient: Rita Dixon   : 1955   MRN: [id number]      Evaluation Date: 10/23/2024      Admitting Dx: AMS (altered mental status) [R41.82]  Treatment Diagnosis: Cognitive-Linguistic Deficits , Speech Language Deficits , Oropharyngeal Dysphagia   Pain: Did not state                                  Recommendations      Recommended Diet and Intervention 10/23/2024:  Diet Solids Recommendation:  NPO with nutrition/hydration via alternative means  Liquid Consistency Recommendation:  NPO Recommended form of Meds:   Meds via alternative means      Compensatory strategies: Aspiration Precautions , To be determined     Discharge Recommendations:  Discharge recommendations to be determined pending ongoing follow-up during acute care stay    History/Course of Treatment     H&P:   10/3/2024  \"Rita Dixon is a 68 y.o. female with PMHx notable for hypertension, GBM s/p multiple resection (, 24), focal epilepsy who presented to [facility] on 24 with left-sided weakness.  Following her recent craniotomy for resection of right anterolateral temporal lobe GBM she developed generalized tonic-clonic seizure, with some left-sided weakness.  Weakness improved and patient was able to return home on , however she returned on  with worsening left-sided weakness.  MRI brain confirmed acute infarct involving the right basal ganglia and corona radiata.  Neurology was consulted, recommended permissive hypertension noting a pressure dependent exam, so home blood pressure medications were held.  She was continued on her home antiepileptics, as well as a dexamethasone taper per neurosurgery.  Patient reports that she is doing well today.  She had a mild right-sided headache which responded well to Tylenol.  Reports inability to move her left arm or leg.   During ARU stay, SLP recommended regular solids, thin liquids, and medications whole with water. She was seemingly tolerating this diet without overt s/s of aspiration.   Prior Speech History: Pt was also participating in speech therapy to target cognition. During initial evaluation (10/3/24) she scored 30/50 on the BCAT indicating mild dementia. Short term goals targeted problem solving, working memory, short term memory, attention, and visuospatial awareness. Per chart, pt transitioned to acute medical floor secondary to delayed response times and lethargy.   Reason for referral: SLP evaluation orders received due to CVA protocol , prior hx of dysphagia , decline in medical status , cognitive state , and prior hx of CVA .      Evaluation/Treatment   Subjective:  Pt was upright in bed with  and daughter present. SLP was asked to come back and assess swallow function once NG was removed this afternoon.     Dysphagia Treatment:     Assessment of Texture Tolerance:  Diet level prior to treatment: NPO with nutrition/hydration via alternative means ,    Tolerance of Current Diet Level:N/A     -Impressions: Pt was repositioned Upright in bed , required ongoing cueing to maintain alertness and engagement during session. NG tube was removed for reassessment of swallow function. Currently on room air. A single trial of thin liquids via tsp was trialed to assess swallow function. Pt required full feeding assistance. Pt demonstrated reduced bolus stripping of thin liquids from tsp and extensively prolonged oral holding. Pt made no attempt to orally manipulate bolus or initiate swallow. She required frequent verbal cues to remain alert and engaged with PO in oral cavity. Following 20+ second delay of oral holding, SLP was able to get pt to open oral cavity and expel liquid bolus. Further trials were discontinued due to extensive oral holding and reduced level of alertness/engagement with PO trials; discussed with family

## 2024-10-23 NOTE — PROGRESS NOTES
episode of a delayed cough prior to significantly delayed swallow onset. Verbal cues did not appear effective with initiation of swallow. Further trials were discontinued due to oral holding becoming more extensive and swallow initiation becoming more progressively delayed, suspect in relation to reduced engagement/alertness. Alertness appears to have improved this date with ability to trial PO trials however pt continues to not demonstrate safe or functional PO tolerance for initiation of diet at this time. Pt demonstrates increased risk for aspiration due to cognitive state , co morbidities , deconditioning , inability to self feed , weak cough , prior hx of dysphagia , and new CVA . Based on today's assessment recommend NPO with nutrition/hydration via alternative means  with ongoing assessment of swallow function.       Eating Assistance:   Dependent    Speech Language/Cognitive Treatment:  Impressions: This date goals were targeted via functional communication and orientation. Pt demonstrated minimal verbalizations and reduced eye contact throughout session unless prompted. Pt was able to verbalize name and  but was unable to confirm or deny current location or situation. Responses were not consistent and were variably delayed. She was able to verbalize daughters name although no further verbalizations were noted. Simple yes/no preferential questions appeared to be consistent when pt would provide response although simple 1-step commands were inconsistent. Pt appears to present with a significant decline in speech-language cognitive status since admission to inpatient. Based on today's session recommend continued skilled speech intervention targeting communication to promote return to baseline.    Assessment: Progress limited secondary to medical status      Goals     Goals:  Dysphagia Goals: Pt will functionally tolerate recommended diet with no overt clinical s/s of aspiration   Pt will advance to least  restrictive diet as indicated   If s/s of pharyngeal phase dysphagia continue to be noted pt will participate in instrumental assessment of swallow function  to further assess pharyngeal phase of swallow, assist with diet recommendations and to further direct plan of care     Speech Language/Cognitive Goals: Pt will demonstrate improved motor initiation for verbal expression of wants/needs to 80% min cues   Pt will improve auditory processing/comprehension of commands and questions to 80%, via graded tasks   Pt will improve orientation and short term recall  via graded tasks to 80%  Pt will participate in ongoing cognitive assessment with goals to be established as indicated     Above goals reviewed on 10/23/2024. Goals are ongoing at this time unless indicated above.     POC/Education     Dysphagia Therapeutic Intervention:  Patient/Family Education , Therapeutic Trials with SLP , Instrumental assessment of swallow function (Modified Barium Swallow Study) , Instrumental assessment of swallow function (SLP FEES EVALUATION)     Plan of care: 3-5 times per week during acute care stay.      Education:  Provided education regarding role of SLP, results of assessment, recommendations and general speech pathology plan of care:  Pt requires ongoing learning   No evidence of comprehension    If patient discharges prior to next visit, this note will serve as discharge.     Treatment time:  Timed Code Treatment Minutes: 0 minutes  Total Treatment Time Minutes: 25    Electronically signed by:    Rafa Mar M.A., CCC-SLP   Speech-Language Pathologist  [id number]

## 2024-10-23 NOTE — CONSULTS
PALLIATIVE MEDICINE CONSULTATION     Patient name:Rita Dixon   MRN:[id number]    :1955  Room/Bed:[facility]-3376/3376-01   LOS: 11 days         Date of consult:10/23/2024    Consults        ASSESSMENT/RECOMMENDATIONS     68 y.o. female with AMS and seizure with glioblastoma       Symptom Management:  AMS- pt not verbally responding today does nod her head and is tearful   Seizure- pt somnolent since her seizure this admission    Goals of Care- Met with pt, son, daughter and spouse by phone. Following introductions, a broad overview of [facility] clinical course was given, followed by the opportunity for those present to ask any questions regarding the medical aspects of the case. I explained the typical trajectory that occurs when age and frailty meet chronic medical illness, which involves increasingly frequent hospitalizations (along with the nosocomial complications they carry) and a progressive decline in performance status (as well as potential for rehabilitation) until an unsurvivable event occurs. Emphasis was placed on the subjective nature of quality of life, and what a person is willing to endure to stay alive, even if his/her medical status continues to decline. This led to a review of Vanessa's’s wishes as understood by family, with the specific question: Would Vanessa want to continue life-prolonging treatment if she could understand the current medical reality of his condition? This led to a discussion with family regarding pts living will and her wishes as shared with them that she would not want PEG tube or artificial feeding and that quality not quantity of life was important to her. Discussed with medical team plan for NG removal and plan to switch to comfort care and Dc home tomorrow with Hospice once bed and equipment have been delivered       Patient/Family Goals of Care :    DNrCC, comfort care plan Dc tomorrow to home [facility] called with transport time and       Head:        Comments: Scaring from craniotomy      Nose: No congestion.      Mouth/Throat:      Mouth: Mucous membranes are dry.   Eyes:      Pupils: Pupils are equal, round, and reactive to light.   Cardiovascular:      Rate and Rhythm: Normal rate.      Heart sounds: No murmur heard.     No friction rub. No gallop.   Pulmonary:      Effort: No respiratory distress.   Abdominal:      Palpations: Abdomen is soft.   Musculoskeletal:      Cervical back: Normal range of motion.      Right lower leg: Edema present.      Left lower leg: Edema present.   Skin:     General: Skin is warm and dry.      Coloration: Skin is pale.      Findings: Bruising present.   Neurological:      Mental Status: She is alert.      Comments: Non-verbal                OBJECTIVE   BP (!) 155/83   Pulse 94   Temp 98.6 °F (37 °C) (Oral)   Resp 16   Ht 1.524 m (5')   Wt 56 kg (123 lb 6.4 oz)   SpO2 96%   BMI 24.10 kg/m²   I/O last 3 completed shifts:  In: 1021 [NG/GT:1021]  Out: 900 [Urine:900]  I/O this shift:  In: -   Out: 1400 [Urine:1400]      Palliative Medicine Interventions:    patient/family support  Goals of Care discussions with patient/surrogate  Spiritual Interventions: none         DATA:  Current labs in the epic chart reviewed as of 10/23/2024   Review of previous notes, admits, labs, radiology and testing relevant to this consult done in this chart today 10/23/2024    Medical Decision Making:  The following items were considered in medical decision making:  Review of prior external note(s) from each unique source relevant to today's visit: Hospitalist, Case management, neurology   Discussion of management or test with external physician/qualified health care professional: Hospitalist, Case management, neurology   Unique test results reviewed: CBC and BMP, mri        Risk of Complications/Morbidity: High   Illness(es)/ Infection present that pose threat to bodily function.   There is potential for severe exacerbation of

## 2024-10-24 VITALS
RESPIRATION RATE: 16 BRPM | OXYGEN SATURATION: 95 % | TEMPERATURE: 98.8 F | SYSTOLIC BLOOD PRESSURE: 108 MMHG | HEART RATE: 63 BPM | BODY MASS INDEX: 23.64 KG/M2 | DIASTOLIC BLOOD PRESSURE: 65 MMHG | HEIGHT: 60 IN | WEIGHT: 120.4 LBS

## 2024-10-24 LAB
ANION GAP SERPL CALCULATED.3IONS-SCNC: 10 MMOL/L (ref 3–16)
BUN SERPL-MCNC: 23 MG/DL (ref 7–20)
CALCIUM SERPL-MCNC: 9.3 MG/DL (ref 8.3–10.6)
CHLORIDE SERPL-SCNC: 106 MMOL/L (ref 99–110)
CO2 SERPL-SCNC: 26 MMOL/L (ref 21–32)
CREAT SERPL-MCNC: 0.5 MG/DL (ref 0.6–1.2)
GFR SERPLBLD CREATININE-BSD FMLA CKD-EPI: >90 ML/MIN/{1.73_M2}
GLUCOSE SERPL-MCNC: 94 MG/DL (ref 70–99)
POTASSIUM SERPL-SCNC: 3.9 MMOL/L (ref 3.5–5.1)
SODIUM SERPL-SCNC: 142 MMOL/L (ref 136–145)

## 2024-10-24 PROCEDURE — 36415 COLL VENOUS BLD VENIPUNCTURE: CPT

## 2024-10-24 PROCEDURE — 2580000003 HC RX 258: Performed by: INTERNAL MEDICINE

## 2024-10-24 PROCEDURE — C9254 INJECTION, LACOSAMIDE: HCPCS | Performed by: INTERNAL MEDICINE

## 2024-10-24 PROCEDURE — 80048 BASIC METABOLIC PNL TOTAL CA: CPT

## 2024-10-24 PROCEDURE — 6360000002 HC RX W HCPCS: Performed by: INTERNAL MEDICINE

## 2024-10-24 PROCEDURE — 6370000000 HC RX 637 (ALT 250 FOR IP): Performed by: CLINICAL NURSE SPECIALIST

## 2024-10-24 RX ORDER — DIAZEPAM 10 MG/2G
10 GEL RECTAL 2 TIMES DAILY PRN
Qty: 20 EACH | Refills: 0 | Status: SHIPPED | OUTPATIENT
Start: 2024-10-24 | End: 2024-10-24

## 2024-10-24 RX ORDER — DIAZEPAM 10 MG/2G
10 GEL RECTAL 2 TIMES DAILY PRN
Qty: 10 EACH | Refills: 0 | Status: SHIPPED | OUTPATIENT
Start: 2024-10-24 | End: 2024-10-29

## 2024-10-24 RX ORDER — LORAZEPAM 2 MG/ML
1 CONCENTRATE ORAL EVERY 4 HOURS PRN
Qty: 10 ML | Refills: 0 | Status: SHIPPED | OUTPATIENT
Start: 2024-10-24 | End: 2024-10-27

## 2024-10-24 RX ORDER — LEVETIRACETAM 500 MG/1
500 TABLET ORAL 2 TIMES DAILY
Qty: 20 TABLET | Refills: 0 | Status: SHIPPED | OUTPATIENT
Start: 2024-10-24 | End: 2024-11-03

## 2024-10-24 RX ORDER — MORPHINE SULFATE 20 MG/ML
5 SOLUTION ORAL
Status: DISCONTINUED | OUTPATIENT
Start: 2024-10-24 | End: 2024-10-24 | Stop reason: HOSPADM

## 2024-10-24 RX ORDER — LORAZEPAM 2 MG/ML
1 CONCENTRATE ORAL EVERY 4 HOURS PRN
Status: DISCONTINUED | OUTPATIENT
Start: 2024-10-24 | End: 2024-10-24 | Stop reason: HOSPADM

## 2024-10-24 RX ORDER — MORPHINE SULFATE 20 MG/ML
5 SOLUTION ORAL
Qty: 10 ML | Refills: 0 | Status: SHIPPED | OUTPATIENT
Start: 2024-10-24 | End: 2024-10-27

## 2024-10-24 RX ADMIN — Medication 5 ML: at 10:24

## 2024-10-24 RX ADMIN — LACOSAMIDE 200 MG: 10 INJECTION INTRAVENOUS at 10:52

## 2024-10-24 RX ADMIN — Medication 5 MG: at 10:17

## 2024-10-24 RX ADMIN — LABETALOL HYDROCHLORIDE 10 MG: 5 INJECTION, SOLUTION INTRAVENOUS at 08:50

## 2024-10-24 ASSESSMENT — PAIN SCALES - WONG BAKER
WONGBAKER_NUMERICALRESPONSE: NO HURT

## 2024-10-24 ASSESSMENT — PAIN DESCRIPTION - ORIENTATION: ORIENTATION: RIGHT;LEFT

## 2024-10-24 ASSESSMENT — PAIN DESCRIPTION - LOCATION: LOCATION: LEG

## 2024-10-24 ASSESSMENT — PAIN DESCRIPTION - DESCRIPTORS: DESCRIPTORS: ACHING

## 2024-10-24 ASSESSMENT — PAIN - FUNCTIONAL ASSESSMENT: PAIN_FUNCTIONAL_ASSESSMENT: PREVENTS OR INTERFERES WITH ALL ACTIVE AND SOME PASSIVE ACTIVITIES

## 2024-10-24 NOTE — PROGRESS NOTES
No acute events over night, VSS, NSR on tele. Q2 turns, Q2 check and change. Latham remains in place. Pt appears to have slept comfortably most of the night.     Evangelist Saenz RN

## 2024-10-24 NOTE — PROGRESS NOTES
Transport to bedside and pt transferred to [facility] without incident. IV removed. Tele removed and returned to [facility]. Pt left with kerns catheter intact. Emptied kerns catheter bag before transport. VSS upon discharge. AVS paperwork discussed with patient's . Meds to beds delivered morphine and lorazepam to bedside which  took to car with pt belongings bag full of supplies provided by this RN. 2 paper prescriptions were given to  for diazepam rectal gel and keppra pills. Copy of DNR paperwork made and in hard chart. Original DNR given to ambulance drivers. Confirmed with charge nurse Katty NOGUEIRA that nothing else needed to be done prior to pt discharge.

## 2024-10-24 NOTE — DISCHARGE SUMMARY
V2.0  Discharge Summary    Name:  Rita Dixon /Age/Sex: 1955 (68 y.o. female)   Admit Date: 10/12/2024  Discharge Date: 10/24/24    MRN & CSN:  [id number] & [id number] Encounter Date and Time 10/24/24 10:25 AM EDT    Attending:  Danisha Zavala MD Discharging Provider: Danisha Zavala MD       Hospital Course:     Brief HPI: Rita Dixon is a 68 y.o. female with pmh of history of HTN, seizure disorder, recent craniotomy for glioblastoma, recent right basal ganglia and coronary radiata CVA with left-sided weakness, who was rehabilitating in ARU, admitted for further management of seizures and AMS.      Epilepsy with breakthrough seizures:   Initial CT head showed stable subdural hematoma postsurgical changes.    Repeat CT 10/16 showed stable hemorrhage.  Neurology consulted: continued Lamictal and valproic acid, discontinued Keppra due to excessive sleepiness.   EEG done on 10/17 and 10/22 negative for epileptiform discharges.  Poor prognosis with no further recommendations per neurology.     Right temporal lobe SDH: Stable CT head noted for subdural hematoma.       AMS: Multifactorial in the setting of seizures, GBM, CVA, SDH.  Patient still waxing and waning with no significant improvement in mentation..      Dysphagia: Trail of enteral feeding via NGT.   Spouse declined PEG tube per patients's wishes.  Speech consulted: remained strictly n.p.o. due to failed reevaluations.     Hypophosphatemia: Replaced.    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       Right temporal GBM s/p craniotomy: Stable.     Urinary retention: Discharged with Latham catheter in place.       Goals of care: DNR CC.  Palliative care/hospice on consulted.  Discontinued NGT feeding and discharged with hospice.      The patient's spouse expressed appropriate understanding of, and agreement with the discharge recommendations, medications, and plan.     Consults this admission:  ELIZABETH

## 2024-10-24 NOTE — CARE COORDINATION
10/24/24 1213   IMM Letter   IMM Letter given to Patient/Family/Significant other/Guardian/POA/by: IMM given by CM   IMM Letter date given: 10/24/24   IMM Letter time given: 1140

## 2024-10-24 NOTE — PROGRESS NOTES
CLINICAL PHARMACY NOTE: MEDS TO BEDS    Total # of Prescriptions Filled: 2   The following medications were delivered to the patient:  MORPHINE SULFATE 20MG/ML CONCENTRATE  LORAZEPAM ORAL CON 2MG/ML    Additional Documentation: Tessa NOGUEIRA approved to deliver medications to patient room=signed  [facility]

## 2024-10-24 NOTE — CARE COORDINATION
Case Management -  Discharge Note      Patient Name: Rita Dixon                   YOB: 1955            Readmission Risk (Low < 19, Mod (19-27), High > 27): Readmission Risk Score: 14.7    Current PCP: Unknown, Provider      (IMM) Important Message from Medicare:    Has pt received appropriate IMM before discharge if required: N/A  Discharging with Hospice services    PT AM-PAC: 6 /24  OT AM-PAC: 8 /24    Patient/patient representative has been educated on the benefits of Hospice Care as well as the possible risks of declining recommended services. Patient/patient representative has acknowledged the information provided and decided on the following discharge plan. Patient/ patient representative has been provided freedom of choice regarding service provider, supported by basic dialogue that supports the patient's individualized plan of care/goals.    [facility]  [address] [id number]  Phone: [phone]     Transportation scheduled for: 1:00 pm  Transportation provided by [facility]  AVS faxed and agency notified:  Yes    Nurse to call report to facility      Financial    Payor: [employer] / Plan: [employer] - CHOICE PLU / Product Type: *No Product type* /     Pharmacy:  Potential assistance Purchasing Medications:    Meds-to-Beds request: Yes      [facility] - [place], [place] - [address] [address] [address] - P [phone] - F [phone]  [address] [place] [address] [place] [address]  Phone: [phone] Fax: [phone]      Notes:    Additional Case Management Notes: Spouse arranged transport for 1:00 pm via [facility].  Pharmacy delivered scripts to bedside.  AVS faxed to [facility].     Electronically signed by WESLEY Ramesh on 10/24/24 at 12:00 PM EDT

## 2024-10-24 NOTE — PROGRESS NOTES
Brief Nutrition Note     Reason for Visit: Follow-up    Nutrition Assessment: Spouse has decided to take the pt home with Hospice. NG tube removed yesterday and TF stopped.     Current Nutrition Therapies:    ADULT TUBE FEEDING; Nasogastric; Standard with Fiber; Continuous; 40; No; 140; Q 4 hours    Anthropometrics:   Current Height: 152.4 cm (5')  Current Weight - Scale: 56 kg (123 lb 6.4 oz)      Monitoring and Evaluation:  No nutrition diagnosis. Patient will be monitored per nutrition standards of care.     Consult Dietitian if nutrition intervention essential to patient care is needed.     Discharge Planning:  No needs    Amanda Aragon, MS, RD, LD

## 2024-10-24 NOTE — PROGRESS NOTES
PALLIATIVE MEDICINE PROGRESS NOTE     Patient name:Rita Dixon    MRN:[id number] :1955  Room/Bed:[facility]3376/3376-01    LOS: 12 days        ASSESSMENT/RECOMMENDATIONS   68 y.o. female with AMS and seizure with glioblastoma         Symptom Management:  AMS- pt not verbally responding today does nod her head and is tearful  Dysphagia- pt unsafe to swallow despite NG removal per SLP evaluation    Seizure- pt somnolent since her seizure this admission    Goals of Care- talked to spouse at bedside this am. We talked through things needed for DC and helped him process his feelings. Comfort Rx ordered and all questions answered for pt, family and staff         Patient/Family Goals of Care :    10/23  DNRCC, comfort care plan Dc tomorrow to home [facility] needs called with transport time and will meet pt at home and enroll. Hermila with [facility] [phone]     10/24  talked to spouse at bedside this am. We talked through things needed for DC and helped him process his feelings. Comfort Rx ordered and all questions answered for pt, family and staff      Disposition/Discharge Plan:   Pending      Advance Directives:     The patient has appointed the following active healthcare agents:    Primary Decision Maker: Francis Dixon - Spouse - [phone]     The Patient has the following current code status:    Code Status: DNR-CC        Interactive exchange regarding medications,tests and procedures with: patient, floor RN, Dr Zavala   Thank you for allowing us to participate in the care of this patient.      SUBJECTIVE     Chief Complaint: Dysphagia    Last 24 hours:   Pt unable to safely swallow, plan DC home with comfort care     ROS:    Review of Systems   Unable to perform ROS: Patient unresponsive                 Physical Exam  Constitutional:       Appearance: She is ill-appearing.   HENT:      Head: Normocephalic and atraumatic.      Nose: No congestion.      Mouth/Throat:      Mouth: Mucous

## 2024-10-24 NOTE — PLAN OF CARE
Problem: Discharge Planning  Goal: Discharge to home or other facility with appropriate resources  10/24/2024 1134 by Tessa Bueno RN  Outcome: Progressing     Problem: Chronic Conditions and Co-morbidities  Goal: Patient's chronic conditions and co-morbidity symptoms are monitored and maintained or improved  Outcome: Progressing     Problem: Skin/Tissue Integrity  Goal: Absence of new skin breakdown  Description: 1.  Monitor for areas of redness and/or skin breakdown  2.  Assess vascular access sites hourly  3.  Every 4-6 hours minimum:  Change oxygen saturation probe site  4.  Every 4-6 hours:  If on nasal continuous positive airway pressure, respiratory therapy assess nares and determine need for appliance change or resting period.  Outcome: Progressing     Problem: ABCDS Injury Assessment  Goal: Absence of physical injury  Outcome: Progressing     Problem: Safety - Adult  Goal: Free from fall injury  Outcome: Progressing     Problem: Pain  Goal: Verbalizes/displays adequate comfort level or baseline comfort level  Outcome: Progressing     Problem: Decision Making  Goal: Pt/Family able to effectively weigh alternatives and participate in decision making related to treatment and care  Description: INTERVENTIONS:  1. Determine when there are differences between patient's view, family's view, and healthcare provider's view of condition  2. Facilitate patient and family articulation of goals for care  3. Help patient and family identify pros/cons of alternative solutions  4. Provide information as requested by patient/family  5. Respect patient/family right to receive or not to receive information  6. Serve as a liaison between patient and family and health care team  7. Initiate Consults from Ethics, Palliative Care or initiate Family Care Conference as is appropriate  Outcome: Progressing     Problem: Nutrition Deficit:  Goal: Optimize nutritional status  Outcome: Progressing

## 2024-10-24 NOTE — PROGRESS NOTES
Speech Language Pathology  Discharge    Rita Dixon  1955    Pt is currently on caseload for speech and dysphagia treatment. Pt and family have elected to discharge home with hospice services this date. Will discharge pt from caseload at this time.     Thank you,     Rafa Mar M.A., CCC-SLP   Speech-Language Pathologist  [id number]